# Patient Record
Sex: MALE | Race: WHITE | NOT HISPANIC OR LATINO | Employment: OTHER | URBAN - METROPOLITAN AREA
[De-identification: names, ages, dates, MRNs, and addresses within clinical notes are randomized per-mention and may not be internally consistent; named-entity substitution may affect disease eponyms.]

---

## 2017-02-08 ENCOUNTER — ALLSCRIPTS OFFICE VISIT (OUTPATIENT)
Dept: OTHER | Facility: OTHER | Age: 72
End: 2017-02-08

## 2017-03-22 ENCOUNTER — ALLSCRIPTS OFFICE VISIT (OUTPATIENT)
Dept: OTHER | Facility: OTHER | Age: 72
End: 2017-03-22

## 2017-04-19 DIAGNOSIS — N40.0 ENLARGED PROSTATE WITHOUT LOWER URINARY TRACT SYMPTOMS (LUTS): ICD-10-CM

## 2017-04-19 DIAGNOSIS — E78.00 PURE HYPERCHOLESTEROLEMIA: ICD-10-CM

## 2017-04-19 DIAGNOSIS — I10 ESSENTIAL (PRIMARY) HYPERTENSION: ICD-10-CM

## 2017-09-05 DIAGNOSIS — N40.0 ENLARGED PROSTATE WITHOUT LOWER URINARY TRACT SYMPTOMS (LUTS): ICD-10-CM

## 2017-10-02 ENCOUNTER — ALLSCRIPTS OFFICE VISIT (OUTPATIENT)
Dept: OTHER | Facility: OTHER | Age: 72
End: 2017-10-02

## 2018-01-12 VITALS
BODY MASS INDEX: 27.7 KG/M2 | OXYGEN SATURATION: 97 % | HEART RATE: 84 BPM | DIASTOLIC BLOOD PRESSURE: 68 MMHG | TEMPERATURE: 97 F | HEIGHT: 70 IN | SYSTOLIC BLOOD PRESSURE: 124 MMHG | WEIGHT: 193.5 LBS

## 2018-01-13 VITALS
TEMPERATURE: 97.9 F | HEART RATE: 86 BPM | DIASTOLIC BLOOD PRESSURE: 74 MMHG | SYSTOLIC BLOOD PRESSURE: 118 MMHG | BODY MASS INDEX: 27.51 KG/M2 | HEIGHT: 70 IN | WEIGHT: 192.13 LBS | OXYGEN SATURATION: 98 %

## 2018-01-13 VITALS
OXYGEN SATURATION: 97 % | DIASTOLIC BLOOD PRESSURE: 72 MMHG | SYSTOLIC BLOOD PRESSURE: 122 MMHG | HEART RATE: 75 BPM | HEIGHT: 70 IN | WEIGHT: 192 LBS | BODY MASS INDEX: 27.49 KG/M2 | TEMPERATURE: 98.3 F

## 2018-04-02 DIAGNOSIS — N40.0 ENLARGED PROSTATE WITHOUT LOWER URINARY TRACT SYMPTOMS (LUTS): ICD-10-CM

## 2018-04-02 DIAGNOSIS — H61.23 IMPACTED CERUMEN OF BOTH EARS: ICD-10-CM

## 2018-04-02 DIAGNOSIS — E78.00 PURE HYPERCHOLESTEROLEMIA: ICD-10-CM

## 2018-04-02 DIAGNOSIS — I10 ESSENTIAL (PRIMARY) HYPERTENSION: ICD-10-CM

## 2018-05-16 LAB
LEFT EYE DIABETIC RETINOPATHY: NORMAL
RIGHT EYE DIABETIC RETINOPATHY: NORMAL

## 2018-05-23 ENCOUNTER — OFFICE VISIT (OUTPATIENT)
Dept: INTERNAL MEDICINE CLINIC | Facility: CLINIC | Age: 73
End: 2018-05-23
Payer: MEDICARE

## 2018-05-23 VITALS
TEMPERATURE: 97.6 F | DIASTOLIC BLOOD PRESSURE: 80 MMHG | HEART RATE: 72 BPM | WEIGHT: 187.2 LBS | OXYGEN SATURATION: 98 % | BODY MASS INDEX: 26.8 KG/M2 | SYSTOLIC BLOOD PRESSURE: 122 MMHG | HEIGHT: 70 IN

## 2018-05-23 DIAGNOSIS — E78.00 HYPERCHOLESTEROLEMIA: Primary | ICD-10-CM

## 2018-05-23 DIAGNOSIS — H61.23 IMPACTED CERUMEN OF BOTH EARS: ICD-10-CM

## 2018-05-23 DIAGNOSIS — N40.0 ENLARGED PROSTATE WITHOUT LOWER URINARY TRACT SYMPTOMS (LUTS): ICD-10-CM

## 2018-05-23 DIAGNOSIS — I10 BENIGN ESSENTIAL HTN: ICD-10-CM

## 2018-05-23 PROCEDURE — 99214 OFFICE O/P EST MOD 30 MIN: CPT | Performed by: INTERNAL MEDICINE

## 2018-05-23 RX ORDER — AMPICILLIN TRIHYDRATE 250 MG
1 CAPSULE ORAL 2 TIMES DAILY
COMMUNITY
End: 2018-05-23 | Stop reason: ALTCHOICE

## 2018-05-23 RX ORDER — AMOXICILLIN 500 MG
2 CAPSULE ORAL DAILY
COMMUNITY

## 2018-05-23 RX ORDER — LANOLIN ALCOHOL/MO/W.PET/CERES
1 CREAM (GRAM) TOPICAL DAILY
COMMUNITY

## 2018-05-23 RX ORDER — ELECTROLYTES/DEXTROSE
1 SOLUTION, ORAL ORAL DAILY
COMMUNITY

## 2018-05-23 RX ORDER — ROSUVASTATIN CALCIUM 5 MG/1
5 TABLET, COATED ORAL DAILY
Qty: 30 TABLET | Refills: 5 | Status: SHIPPED | OUTPATIENT
Start: 2018-05-23 | End: 2018-06-14 | Stop reason: SINTOL

## 2018-05-23 NOTE — PROGRESS NOTES
Assessment/Plan:    Benign essential HTN   Blood pressure is well controlled at the present time  No changes in medication are necessary    Impacted cerumen of both ears   Cerumen manually disimpacted with cerumen spoon with good results  Hypercholesterolemia    Will discontinue red yeast rice and initiate treatment with rosuvastatin 5 mg daily monitoring for myalgias  Enlarged prostate without lower urinary tract symptoms (luts)    Follow-up PSA and free PSA in 6 months  Diagnoses and all orders for this visit:    Hypercholesterolemia  -     rosuvastatin (CRESTOR) 5 mg tablet; Take 1 tablet (5 mg total) by mouth daily for 180 days  -     CBC and Platelet; Future  -     Comprehensive metabolic panel; Future  -     Lipid panel; Future    Benign essential HTN  -     CBC and Platelet; Future  -     Comprehensive metabolic panel; Future  -     Lipid panel; Future    Impacted cerumen of both ears  -     CBC and Platelet; Future  -     Comprehensive metabolic panel; Future  -     Lipid panel; Future    Enlarged prostate without lower urinary tract symptoms (luts)  -     CBC and Platelet; Future  -     Comprehensive metabolic panel; Future  -     Lipid panel; Future  -     PSA, total and free; Future    Other orders  -     Omega-3 Fatty Acids (FISH OIL) 1200 MG CAPS; Take by mouth daily  -     Multiple Vitamins-Minerals (MULTIVITAMIN ADULT) TABS; Take 1 tablet by mouth daily  -     Discontinue: Red Yeast Rice 600 MG CAPS; Take 1 tablet by mouth 2 (two) times a day  -     Ascorbic Acid (VITAMIN C ER) 500 MG CPCR; Take 1 capsule by mouth daily          Subjective:      Patient ID: Teresita Hall is a 71 y o  male  Patient presents to the office for follow-up visit for hypertension, hypercholesterolemia, history of enlarged prostate without prostate symptomatology and recurrent cerumen impactions  Currently he is not experiencing any significant hearing loss  He had labs drawn    Thyroid function is normal  CMP is normal   CBC is normal   PSA is mildly elevated with a free PSA of 26%  Cholesterol is 214  LDL cholesterol is elevated at 151  We discussed the possibility of using a low-dose statin such as Crestor in place of red yeast rice  The thinking being that the low-dose statin may be better tolerated since he has been tolerating the red yeast rice for quite some time now and it may be more effective than the red yeast rice          Family History   Problem Relation Age of Onset    Alcohol abuse Father      independently diagnosed     Heart disease Father     Cancer Father     Hypertension Mother     COPD Mother     Diabetes type II Mother     Hypertension Maternal Grandmother     Coronary artery disease Maternal Grandmother     Diabetes Maternal Grandmother     Lung cancer Maternal Grandmother     Coronary artery disease Family     Stroke Paternal Grandmother     Lung cancer Maternal Grandfather      Social History     Social History    Marital status: /Civil Union     Spouse name: N/A    Number of children: N/A    Years of education: N/A     Occupational History          Social History Main Topics    Smoking status: Never Smoker    Smokeless tobacco: Never Used    Alcohol use Yes      Comment: social     Drug use: No    Sexual activity: Not on file     Other Topics Concern    Not on file     Social History Narrative    Advance direct of file     Caffeine use -  He admits to consuming caffeine via coffee; 2-3 cups per week; and tea 2 cups per day     Uses safety belts          Past Medical History:   Diagnosis Date    Aortic valve disorder     Basal cell carcinoma of nose     removed 07/2015    Hypercholesterolemia     Hyperlipidemia     Mitral valve disorder     Osteoarthritis     Seasonal allergies     resolved 01/21/2015       Current Outpatient Prescriptions:     Ascorbic Acid (VITAMIN C ER) 500 MG CPCR, Take 1 capsule by mouth daily, Disp: , Rfl:     Multiple Vitamins-Minerals (MULTIVITAMIN ADULT) TABS, Take 1 tablet by mouth daily, Disp: , Rfl:     Omega-3 Fatty Acids (FISH OIL) 1200 MG CAPS, Take by mouth daily, Disp: , Rfl:     rosuvastatin (CRESTOR) 5 mg tablet, Take 1 tablet (5 mg total) by mouth daily for 180 days, Disp: 30 tablet, Rfl: 5  Allergies   Allergen Reactions    Seasonal Ic [Cholestatin] Nasal Congestion    Ezetimibe GI Intolerance     Past Surgical History:   Procedure Laterality Date    DENTAL SURGERY      1970's    KNEE SURGERY      OTHER SURGICAL HISTORY      cerumen removal 1st 05/26/2011;   2nd:  08/02/2012         Review of Systems   Constitutional: Negative for activity change, appetite change, chills and fever  HENT: Positive for congestion, postnasal drip, rhinorrhea, sinus pain, sinus pressure and sore throat (Mild sore throat when he wakes up during allergy season, resolves within an hour)  Negative for ear discharge, ear pain and hearing loss  Eyes: Negative  Respiratory: Negative for chest tightness, shortness of breath and wheezing  Cardiovascular: Negative for chest pain  Gastrointestinal: Negative for constipation, diarrhea, nausea and vomiting  Genitourinary: Positive for difficulty urinating and frequency  Negative for dysuria  Musculoskeletal: Positive for arthralgias (Left knee is worse than right knee)  Allergic/Immunologic: Positive for environmental allergies  Neurological: Positive for light-headedness and headaches (Occasional headaches accompanied by sinus pain/pressure)  Negative for dizziness and syncope  Objective:      /80 (BP Location: Left arm, Patient Position: Sitting, Cuff Size: Standard)   Pulse 72   Temp 97 6 °F (36 4 °C) (Oral)   Ht 5' 10" (1 778 m)   Wt 84 9 kg (187 lb 3 2 oz)   SpO2 98%   BMI 26 86 kg/m²          Physical Exam   Constitutional: He is oriented to person, place, and time  He appears well-developed and well-nourished  No distress     HENT:   Head: Normocephalic and atraumatic  Bilateral nonobstructing cerumen   Eyes: Conjunctivae and EOM are normal  Pupils are equal, round, and reactive to light  Neck: Normal range of motion  Neck supple  No JVD present  No tracheal deviation present  Cardiovascular: Normal rate, regular rhythm and normal heart sounds  Pulmonary/Chest: Effort normal  No respiratory distress  Abdominal: Soft  He exhibits no distension  Musculoskeletal: Normal range of motion  He exhibits no edema or deformity  Lymphadenopathy:     He has no cervical adenopathy  Neurological: He is alert and oriented to person, place, and time  Skin: Skin is warm and dry  Psychiatric: He has a normal mood and affect  Thought content normal    Vitals reviewed

## 2018-05-23 NOTE — ASSESSMENT & PLAN NOTE
Will discontinue red yeast rice and initiate treatment with rosuvastatin 5 mg daily monitoring for myalgias

## 2018-05-23 NOTE — PATIENT INSTRUCTIONS
Recheck of labs in 6 months  Substitute Crestor for red yeast rice  Recheck PSA and free PSA  LEBRON on follow-up exam if PSA remains elevated

## 2018-06-12 ENCOUNTER — TELEPHONE (OUTPATIENT)
Dept: INTERNAL MEDICINE CLINIC | Facility: CLINIC | Age: 73
End: 2018-06-12

## 2018-06-12 NOTE — TELEPHONE ENCOUNTER
Pt called and stated that he is having strange reactions to his prescription for Rosuvastatin 5mg tablet  He explains that Dr Villagran Sees stated that if he has any strange reactions to stop taking the medication and so he did  He states that he stopped taking the medication about four days ago  Every time he took the medication it was around dinner time  He notes he would feel very weak, and hungry 3 hours later after eating  The pt would feel a "weird" sensation every time after he would eat  The pt explains his symptoms have gotten better since he has stopped taking the medication  He would like to speak with a provider about the symptoms he was having and also to inform Dr Villagran Sees that he will no longer be taking the medication, and would also like to know if he should make an appointment and be seen in regards to the situation  If someone could please look into this, and If you have any questions you can reach the pt at [phone]  Thank you!

## 2018-06-12 NOTE — TELEPHONE ENCOUNTER
Spoke with this patient and he is describing nonspecific hunger after eating while taking his statin  Patient advised to make an appointment to follow up with Dr Tiki Marques and come in sooner if acute symptoms develop

## 2018-06-14 ENCOUNTER — OFFICE VISIT (OUTPATIENT)
Dept: INTERNAL MEDICINE CLINIC | Facility: CLINIC | Age: 73
End: 2018-06-14
Payer: MEDICARE

## 2018-06-14 VITALS
WEIGHT: 190.4 LBS | BODY MASS INDEX: 26.65 KG/M2 | HEART RATE: 83 BPM | HEIGHT: 71 IN | TEMPERATURE: 98 F | OXYGEN SATURATION: 96 % | DIASTOLIC BLOOD PRESSURE: 82 MMHG | RESPIRATION RATE: 20 BRPM | SYSTOLIC BLOOD PRESSURE: 130 MMHG

## 2018-06-14 DIAGNOSIS — E78.00 HYPERCHOLESTEROLEMIA: Primary | ICD-10-CM

## 2018-06-14 DIAGNOSIS — I10 BENIGN ESSENTIAL HTN: ICD-10-CM

## 2018-06-14 PROBLEM — T50.905A DRUG REACTION: Status: ACTIVE | Noted: 2018-06-14

## 2018-06-14 PROCEDURE — 99213 OFFICE O/P EST LOW 20 MIN: CPT | Performed by: INTERNAL MEDICINE

## 2018-06-14 NOTE — ASSESSMENT & PLAN NOTE
Rosuvastatin will be discontinued we will return to red yeast rice 600 mg daily increasing to 1800 mg daily as tolerated

## 2018-06-14 NOTE — PROGRESS NOTES
Assessment/Plan:    Benign essential HTN   Blood pressure is stable no additional therapy is indicated  Hypercholesterolemia    Rosuvastatin will be discontinued we will return to red yeast rice 600 mg daily increasing to 1800 mg daily as tolerated       Diagnoses and all orders for this visit:    Hypercholesterolemia    Benign essential HTN          Subjective:      Patient ID: Terri Izquierdo is a 71 y o  male  Patient presents to the office after experiencing a  Reaction to rosuvastatin  He had previously been intolerant to statin medications and after a long well on red yeast rice we attempted to reintroduce statin medications at a low dose  He was prescribed 5 mg of rosuvastatin and after taking the 1st dose experienced some numbness muscle fatigue and a hunger sensation which persisted into the next day  He waited a few more days for symptoms to resolve and then reintroduce the medicine and began to feel the same symptoms once again so he discontinued the medication and after for 5 days he now feels back to his baseline          Family History   Problem Relation Age of Onset    Alcohol abuse Father         independently diagnosed     Heart disease Father     Cancer Father     Hypertension Mother     COPD Mother     Diabetes type II Mother     Hypertension Maternal Grandmother     Coronary artery disease Maternal Grandmother     Diabetes Maternal Grandmother     Lung cancer Maternal Grandmother     Coronary artery disease Family     Stroke Paternal Grandmother     Lung cancer Maternal Grandfather      Social History     Social History    Marital status: /Civil Union     Spouse name: N/A    Number of children: N/A    Years of education: N/A     Occupational History          Social History Main Topics    Smoking status: Never Smoker    Smokeless tobacco: Never Used    Alcohol use Yes      Comment: socially    Drug use: No    Sexual activity: Not on file     Other Topics Concern    Not on file     Social History Narrative    Advance direct of file     Caffeine use -  He admits to consuming caffeine via coffee; 2-3 cups per week; and tea 2 cups per day     Uses safety belts          Past Medical History:   Diagnosis Date    Aortic valve disorder     Basal cell carcinoma of nose     removed 07/2015    Hypercholesterolemia     Hyperlipidemia     Mitral valve disorder     Osteoarthritis     Seasonal allergies     resolved 01/21/2015       Current Outpatient Prescriptions:     Ascorbic Acid (VITAMIN C ER) 500 MG CPCR, Take 1 capsule by mouth daily, Disp: , Rfl:     Multiple Vitamins-Minerals (MULTIVITAMIN ADULT) TABS, Take 1 tablet by mouth daily, Disp: , Rfl:     Omega-3 Fatty Acids (FISH OIL) 1200 MG CAPS, Take by mouth daily, Disp: , Rfl:   Allergies   Allergen Reactions    Seasonal Ic [Cholestatin] Nasal Congestion    Ezetimibe GI Intolerance     Past Surgical History:   Procedure Laterality Date    DENTAL SURGERY      1970's    KNEE SURGERY      OTHER SURGICAL HISTORY      cerumen removal 1st 05/26/2011;   2nd:  08/02/2012         Review of Systems   Constitutional: Positive for fatigue (When taking rosuvastatin)  HENT: Negative  Eyes: Negative  Respiratory: Negative  Cardiovascular: Negative  Musculoskeletal: Negative  Neurological: Positive for numbness (when taking rosuvastatin)  Objective:      /82 (BP Location: Right arm, Patient Position: Sitting, Cuff Size: Adult)   Pulse 83   Temp 98 °F (36 7 °C) (Oral)   Resp 20   Ht 5' 11" (1 803 m) Comment: with shoes on  Wt 86 4 kg (190 lb 6 4 oz) Comment: with shoes on  SpO2 96% Comment: room air  BMI 26 56 kg/m²          Physical Exam   Constitutional: He is oriented to person, place, and time  He appears well-developed and well-nourished  HENT:   Head: Normocephalic and atraumatic  Eyes: Pupils are equal, round, and reactive to light  Neck: No JVD present   No tracheal deviation present  Cardiovascular: Normal rate, regular rhythm and normal heart sounds  Pulmonary/Chest: Effort normal and breath sounds normal    Abdominal: Soft  He exhibits no distension  Musculoskeletal: Normal range of motion  He exhibits no edema  Lymphadenopathy:     He has no cervical adenopathy  Neurological: He is alert and oriented to person, place, and time  Skin: Skin is warm and dry  Psychiatric: He has a normal mood and affect  Thought content normal    Vitals reviewed

## 2018-12-11 ENCOUNTER — OFFICE VISIT (OUTPATIENT)
Dept: INTERNAL MEDICINE CLINIC | Facility: CLINIC | Age: 73
End: 2018-12-11
Payer: MEDICARE

## 2018-12-11 VITALS
OXYGEN SATURATION: 97 % | SYSTOLIC BLOOD PRESSURE: 118 MMHG | HEART RATE: 87 BPM | HEIGHT: 71 IN | WEIGHT: 188.2 LBS | TEMPERATURE: 98.3 F | DIASTOLIC BLOOD PRESSURE: 74 MMHG | BODY MASS INDEX: 26.35 KG/M2

## 2018-12-11 DIAGNOSIS — N40.0 BPH WITH ELEVATED PSA: ICD-10-CM

## 2018-12-11 DIAGNOSIS — M25.562 LEFT KNEE PAIN, UNSPECIFIED CHRONICITY: ICD-10-CM

## 2018-12-11 DIAGNOSIS — R97.20 BPH WITH ELEVATED PSA: ICD-10-CM

## 2018-12-11 DIAGNOSIS — N40.0 ENLARGED PROSTATE WITHOUT LOWER URINARY TRACT SYMPTOMS (LUTS): Primary | ICD-10-CM

## 2018-12-11 DIAGNOSIS — E78.00 HYPERCHOLESTEROLEMIA: ICD-10-CM

## 2018-12-11 DIAGNOSIS — I10 BENIGN ESSENTIAL HTN: ICD-10-CM

## 2018-12-11 PROCEDURE — 99214 OFFICE O/P EST MOD 30 MIN: CPT | Performed by: INTERNAL MEDICINE

## 2018-12-11 RX ORDER — BETAMETHASONE DIPROPIONATE 0.5 MG/G
CREAM TOPICAL
Refills: 5 | COMMUNITY
Start: 2018-10-31 | End: 2020-11-10

## 2018-12-11 NOTE — PROGRESS NOTES
Assessment/Plan:    Benign essential HTN  Blood pressure is well controlled on current medications no changes necessary    Enlarged prostate without lower urinary tract symptoms (luts)  Enlarged prostate  PSA relatively stable mildly increased  Free PSA at 24 percent  LEBRON benign    Left knee pain  Will schedule x-rays of left knee to evaluate for osteoarthritis versus possible meniscus issue    BPH with elevated PSA  LEBRON benign  Follow-up PSA free PSA in 6 months    Hypercholesterolemia  No specific Rx necessary at this time  Diagnoses and all orders for this visit:    Enlarged prostate without lower urinary tract symptoms (luts)  -     PSA, total and free; Future    Benign essential HTN  -     CBC and Platelet; Future  -     Comprehensive metabolic panel; Future    Hypercholesterolemia  -     Lipid panel; Future    Left knee pain, unspecified chronicity  -     XR knee 3 vw left non injury; Future  -     CBC and Platelet; Future  -     Comprehensive metabolic panel; Future  -     C-reactive protein; Future  -     Sedimentation rate, automated; Future    BPH with elevated PSA  -     CBC and Platelet; Future  -     Comprehensive metabolic panel; Future  -     PSA, total and free; Future    Other orders  -     betamethasone, augmented, (DIPROLENE-AF) 0 05 % cream; APPLY TO AFFECTED ONCE DAILY AS DIRECTED          Subjective:      Patient ID: Inocencia Mas is a 71 y o  male  Patient presents for follow-up visit for hypertension,  BPH without symptoms, hypercholesterolemia, and is complaining of recent onset over the past several months of some left knee pain without swelling  Pain is usually worse after long periods of standing or walking  He denies any locking or loss of strength in the knee  He has a history of BPH with elevated PSAs but other than nocturia is not experiencing any significant symptomatology  His labs are reviewed  Cholesterol is mildly elevated    He has been intolerant to statin medications and ezetimibe in the past   The remainder of his labs were essentially stable          Family History   Problem Relation Age of Onset    Alcohol abuse Father         independently diagnosed     Heart disease Father     Cancer Father     Hypertension Mother     COPD Mother     Diabetes type II Mother     Hypertension Maternal Grandmother     Coronary artery disease Maternal Grandmother     Diabetes Maternal Grandmother     Lung cancer Maternal Grandmother     Coronary artery disease Family     Stroke Paternal Grandmother     Lung cancer Maternal Grandfather      Social History     Social History    Marital status: /Civil Union     Spouse name: N/A    Number of children: N/A    Years of education: N/A     Occupational History          Social History Main Topics    Smoking status: Never Smoker    Smokeless tobacco: Never Used    Alcohol use Yes      Comment: socially    Drug use: No    Sexual activity: Not on file     Other Topics Concern    Not on file     Social History Narrative    Advance direct of file     Caffeine use -  He admits to consuming caffeine via coffee; 2-3 cups per week; and tea 2 cups per day     Uses safety belts          Past Medical History:   Diagnosis Date    Aortic valve disorder     Basal cell carcinoma of nose     removed 07/2015    Hypercholesterolemia     Hyperlipidemia     Mitral valve disorder     Osteoarthritis     Seasonal allergies     resolved 01/21/2015       Current Outpatient Prescriptions:     Ascorbic Acid (VITAMIN C ER) 500 MG CPCR, Take 1 capsule by mouth daily, Disp: , Rfl:     betamethasone, augmented, (DIPROLENE-AF) 0 05 % cream, APPLY TO AFFECTED ONCE DAILY AS DIRECTED, Disp: , Rfl: 5    Multiple Vitamins-Minerals (MULTIVITAMIN ADULT) TABS, Take 1 tablet by mouth daily, Disp: , Rfl:     Omega-3 Fatty Acids (FISH OIL) 1200 MG CAPS, Take by mouth daily, Disp: , Rfl:   Allergies   Allergen Reactions    Seasonal Ic [Cholestatin] Nasal Congestion    Ezetimibe GI Intolerance     Past Surgical History:   Procedure Laterality Date    DENTAL SURGERY      1970's    KNEE SURGERY      OTHER SURGICAL HISTORY      cerumen removal 1st 05/26/2011;   2nd:  08/02/2012         Review of Systems   Constitutional: Negative  HENT: Negative  Respiratory: Negative  Cardiovascular: Negative  Gastrointestinal: Negative  Endocrine: Negative  Genitourinary:        Nocturia   Musculoskeletal: Positive for arthralgias (Left knee pain)  Neurological: Negative  Psychiatric/Behavioral: Negative  Objective:      /74 (BP Location: Left arm, Patient Position: Sitting, Cuff Size: Standard)   Pulse 87   Temp 98 3 °F (36 8 °C) (Oral)   Ht 5' 10 5" (1 791 m)   Wt 85 4 kg (188 lb 3 2 oz)   SpO2 97%   BMI 26 62 kg/m²          Physical Exam   Constitutional: He is oriented to person, place, and time  He appears well-developed and well-nourished  No distress  HENT:   Head: Normocephalic and atraumatic  Eyes: Conjunctivae are normal    Neck: No JVD present  No tracheal deviation present  Cardiovascular: Normal rate, normal heart sounds and intact distal pulses  Pulmonary/Chest: Breath sounds normal  He has no wheezes  He has no rales  Abdominal: Soft  There is no tenderness  Genitourinary: Rectum normal    Genitourinary Comments: Enlarged prostate without nodules  Musculoskeletal: Normal range of motion  He exhibits no edema or deformity  Lymphadenopathy:     He has no cervical adenopathy  Neurological: He is alert and oriented to person, place, and time  No focal motor deficits are appreciated  Skin: Skin is warm and dry  He is not diaphoretic  Psychiatric: He has a normal mood and affect   Thought content normal

## 2018-12-13 ENCOUNTER — HOSPITAL ENCOUNTER (OUTPATIENT)
Dept: RADIOLOGY | Facility: HOSPITAL | Age: 73
Discharge: HOME | End: 2018-12-13
Attending: INTERNAL MEDICINE
Payer: MEDICARE

## 2018-12-13 DIAGNOSIS — M25.562 LEFT KNEE PAIN, UNSPECIFIED CHRONICITY: ICD-10-CM

## 2018-12-13 PROCEDURE — 73562 X-RAY EXAM OF KNEE 3: CPT

## 2019-06-19 ENCOUNTER — OFFICE VISIT (OUTPATIENT)
Dept: INTERNAL MEDICINE CLINIC | Facility: CLINIC | Age: 74
End: 2019-06-19
Payer: MEDICARE

## 2019-06-19 VITALS
SYSTOLIC BLOOD PRESSURE: 124 MMHG | DIASTOLIC BLOOD PRESSURE: 74 MMHG | HEIGHT: 70 IN | TEMPERATURE: 97.9 F | BODY MASS INDEX: 26.51 KG/M2 | WEIGHT: 185.2 LBS | OXYGEN SATURATION: 98 % | HEART RATE: 82 BPM

## 2019-06-19 DIAGNOSIS — Z00.00 MEDICARE ANNUAL WELLNESS VISIT, SUBSEQUENT: ICD-10-CM

## 2019-06-19 DIAGNOSIS — Z11.59 NEED FOR HEPATITIS C SCREENING TEST: Primary | ICD-10-CM

## 2019-06-19 DIAGNOSIS — N40.0 BPH WITH ELEVATED PSA: ICD-10-CM

## 2019-06-19 DIAGNOSIS — Z23 NEED FOR DIPHTHERIA-TETANUS-PERTUSSIS (TDAP) VACCINE: ICD-10-CM

## 2019-06-19 DIAGNOSIS — Z12.11 COLON CANCER SCREENING: ICD-10-CM

## 2019-06-19 DIAGNOSIS — E78.00 HYPERCHOLESTEROLEMIA: ICD-10-CM

## 2019-06-19 DIAGNOSIS — Z12.11 SCREENING FOR COLON CANCER: ICD-10-CM

## 2019-06-19 DIAGNOSIS — H61.23 BILATERAL IMPACTED CERUMEN: ICD-10-CM

## 2019-06-19 DIAGNOSIS — M17.12 PRIMARY OSTEOARTHRITIS OF LEFT KNEE: ICD-10-CM

## 2019-06-19 DIAGNOSIS — I10 BENIGN ESSENTIAL HTN: ICD-10-CM

## 2019-06-19 DIAGNOSIS — R97.20 BPH WITH ELEVATED PSA: ICD-10-CM

## 2019-06-19 PROCEDURE — 69210 REMOVE IMPACTED EAR WAX UNI: CPT | Performed by: INTERNAL MEDICINE

## 2019-06-19 PROCEDURE — G0439 PPPS, SUBSEQ VISIT: HCPCS | Performed by: INTERNAL MEDICINE

## 2019-06-19 PROCEDURE — 99214 OFFICE O/P EST MOD 30 MIN: CPT | Performed by: INTERNAL MEDICINE

## 2019-06-19 RX ORDER — EZETIMIBE 10 MG/1
10 TABLET ORAL DAILY
Qty: 30 TABLET | Refills: 5 | Status: SHIPPED | OUTPATIENT
Start: 2019-06-19 | End: 2019-12-04 | Stop reason: SDUPTHER

## 2019-06-19 RX ORDER — UBIDECARENONE 50 MG
1 CAPSULE ORAL 2 TIMES DAILY
COMMUNITY
End: 2020-11-10 | Stop reason: ALTCHOICE

## 2019-06-20 ENCOUNTER — TELEPHONE (OUTPATIENT)
Dept: INTERNAL MEDICINE CLINIC | Facility: CLINIC | Age: 74
End: 2019-06-20

## 2019-06-20 DIAGNOSIS — Z12.11 COLON CANCER SCREENING: Primary | ICD-10-CM

## 2019-07-16 ENCOUNTER — CONSULT (OUTPATIENT)
Dept: UROLOGY | Facility: AMBULATORY SURGERY CENTER | Age: 74
End: 2019-07-16
Payer: MEDICARE

## 2019-07-16 VITALS
SYSTOLIC BLOOD PRESSURE: 144 MMHG | BODY MASS INDEX: 27.14 KG/M2 | HEIGHT: 70 IN | WEIGHT: 189.6 LBS | DIASTOLIC BLOOD PRESSURE: 72 MMHG | HEART RATE: 84 BPM

## 2019-07-16 DIAGNOSIS — R97.20 BPH WITH ELEVATED PSA: ICD-10-CM

## 2019-07-16 DIAGNOSIS — R97.20 ELEVATED PSA: Primary | ICD-10-CM

## 2019-07-16 DIAGNOSIS — N40.0 BPH WITH ELEVATED PSA: ICD-10-CM

## 2019-07-16 PROCEDURE — 99204 OFFICE O/P NEW MOD 45 MIN: CPT | Performed by: UROLOGY

## 2019-07-16 RX ORDER — CIPROFLOXACIN 500 MG/1
500 TABLET, FILM COATED ORAL EVERY 12 HOURS SCHEDULED
Qty: 2 TABLET | Refills: 0 | Status: SHIPPED | OUTPATIENT
Start: 2019-07-16 | End: 2019-07-17

## 2019-07-16 NOTE — PROGRESS NOTES
7/16/2019    Siva Pittman  1945  [id number]        Assessment  Elevated PSA      Discussion  Based on his overall trend of PSA and current PSA of 5 97, I recommend transrectal ultrasound-guided biopsy of the prostate  Risk and benefits of the procedure were discussed and reviewed  Pre biopsy preparations including antibiotics were discussed  History of Present Illness  68 y o  male with a history of a PSA that has been trending upward over time  His most recent PSA is 5 97 from June 2019  He denies family history of prostate cancer  He has very mild lower urinary tract symptoms including nocturia times 1-2 episodes per night  He denies ever seeing gross hematuria  He is retired   He states that he is in relatively good health at 68years of age  AUA Symptom Score  AUA SYMPTOM SCORE      Most Recent Value   AUA SYMPTOM SCORE   How often have you had a sensation of not emptying your bladder completely after you finished urinating? 0   How often have you had to urinate again less than two hours after you finished urinating? 1   How often have you found you stopped and started again several times when you urinate?  0   How often have you found it difficult to postpone urination? 2   How often have you had a weak urinary stream?  2   How often have you had to push or strain to begin urination? 0   How many times did you most typically get up to urinate from the time you went to bed at night until the time you got up in the morning? 2   Quality of Life: If you were to spend the rest of your life with your urinary condition just the way it is now, how would you feel about that?  2   AUA SYMPTOM SCORE  7          Review of Systems  Review of Systems   Constitutional: Negative  HENT: Negative  Eyes: Negative  Respiratory: Negative  Cardiovascular: Negative  Gastrointestinal: Negative  Endocrine: Negative  Genitourinary: Negative  Musculoskeletal: Negative  Skin: Negative  Allergic/Immunologic: Negative  Neurological: Negative  Hematological: Negative  Psychiatric/Behavioral: Negative            Past Medical History  Past Medical History:   Diagnosis Date    Aortic valve disorder     Basal cell carcinoma of nose     removed 07/2015    Hypercholesterolemia     Hyperlipidemia     Mitral valve disorder     Osteoarthritis     Seasonal allergies     resolved 01/21/2015       Past Social History  Past Surgical History:   Procedure Laterality Date    DENTAL SURGERY      1970's    KNEE SURGERY      OTHER SURGICAL HISTORY      cerumen removal 1st 05/26/2011;   2nd:  08/02/2012       Past Family History  Family History   Problem Relation Age of Onset    Alcohol abuse Father         independently diagnosed     Heart disease Father     Cancer Father     Hypertension Mother     COPD Mother     Diabetes type II Mother     Hypertension Maternal Grandmother     Coronary artery disease Maternal Grandmother     Diabetes Maternal Grandmother     Lung cancer Maternal Grandmother     Coronary artery disease Family     Stroke Paternal Grandmother     Lung cancer Maternal Grandfather        Past Social history  Social History     Socioeconomic History    Marital status: /Civil Union     Spouse name: Not on file    Number of children: Not on file    Years of education: Not on file    Highest education level: Not on file   Occupational History    Occupation:    Social Needs    Financial resource strain: Not on file    Food insecurity:     Worry: Not on file     Inability: Not on file    Transportation needs:     Medical: Not on file     Non-medical: Not on file   Tobacco Use    Smoking status: Never Smoker    Smokeless tobacco: Never Used   Substance and Sexual Activity    Alcohol use: Yes     Comment: socially    Drug use: No    Sexual activity: Not on file   Lifestyle    Physical activity:     Days per week: Not on file Minutes per session: Not on file    Stress: Not on file   Relationships    Social connections:     Talks on phone: Not on file     Gets together: Not on file     Attends Caodaism service: Not on file     Active member of club or organization: Not on file     Attends meetings of clubs or organizations: Not on file     Relationship status: Not on file    Intimate partner violence:     Fear of current or ex partner: Not on file     Emotionally abused: Not on file     Physically abused: Not on file     Forced sexual activity: Not on file   Other Topics Concern    Not on file   Social History Narrative    Advance direct of file     Caffeine use -  He admits to consuming caffeine via coffee; 2-3 cups per week; and tea 2 cups per day     Uses safety belts        Current Medications  Current Outpatient Medications   Medication Sig Dispense Refill    Ascorbic Acid (VITAMIN C ER) 500 MG CPCR Take 1 capsule by mouth daily      ezetimibe (ZETIA) 10 mg tablet Take 1 tablet (10 mg total) by mouth daily for 180 days 30 tablet 5    Multiple Vitamins-Minerals (MULTIVITAMIN ADULT) TABS Take 1 tablet by mouth daily      Omega-3 Fatty Acids (FISH OIL) 1200 MG CAPS Take by mouth daily      Red Yeast Rice 600 MG TABS Take 1 tablet by mouth 2 (two) times a day      betamethasone, augmented, (DIPROLENE-AF) 0 05 % cream APPLY TO AFFECTED ONCE DAILY AS DIRECTED  5    ciprofloxacin (CIPRO) 500 mg tablet Take 1 tablet (500 mg total) by mouth every 12 (twelve) hours for 2 doses 1st dose at least one hour prior to biopsy 2 tablet 0     No current facility-administered medications for this visit  Allergies  Allergies   Allergen Reactions    Seasonal Ic [Cholestatin] Nasal Congestion    Ezetimibe GI Intolerance       Past Medical History, Social History, Family History, medications and allergies were reviewed      Vitals  Vitals:    07/16/19 1449   BP: 144/72   Pulse: 84   Weight: 86 kg (189 lb 9 6 oz)   Height: 5' 10" (1 778 m)       Physical Exam  Physical Exam    On examination he is in no acute distress  His abdomen is soft nontender nondistended   examination reveals no CVA tenderness  Digital rectal examination is deferred until the time of prostate biopsy  Gait normal   Affect normal   Skin is warm  Extremities without edema    Neurologic is grossly intact and nonfocal    Results  No results found for: PSA  No results found for: GLUCOSE, CALCIUM, NA, K, CO2, CL, BUN, CREATININE  No results found for: WBC, HGB, HCT, MCV, PLT      Office Urine Dip  No results found for this or any previous visit (from the past 1 hour(s)) ]

## 2019-07-18 ENCOUNTER — TELEPHONE (OUTPATIENT)
Dept: UROLOGY | Facility: AMBULATORY SURGERY CENTER | Age: 74
End: 2019-07-18

## 2019-07-18 NOTE — TELEPHONE ENCOUNTER
I returned patient's call and informed him that if he is getting the colonoscopy 1st, it would not interfere with the biopsy  If he is getting the biopsy first, then he should wait several weeks to get the colonoscopy     Please verify this is correct

## 2019-07-18 NOTE — TELEPHONE ENCOUNTER
Patient managed by Myles Castanon has questions regarding scheduling his colonoscopy around the same time of his biopsy  Would not want test to interfere with on another

## 2019-07-19 NOTE — TELEPHONE ENCOUNTER
Correct -- if having colonoscopy first then would not interfere with biopsy  If biopsy first, should wait 4-6 weeks for colonoscopy

## 2019-07-22 ENCOUNTER — TELEPHONE (OUTPATIENT)
Dept: GASTROENTEROLOGY | Facility: AMBULARY SURGERY CENTER | Age: 74
End: 2019-07-22

## 2019-07-25 ENCOUNTER — TELEPHONE (OUTPATIENT)
Dept: GASTROENTEROLOGY | Facility: AMBULARY SURGERY CENTER | Age: 74
End: 2019-07-25

## 2019-07-31 ENCOUNTER — ANESTHESIA EVENT (OUTPATIENT)
Dept: GASTROENTEROLOGY | Facility: AMBULARY SURGERY CENTER | Age: 74
End: 2019-07-31

## 2019-08-02 ENCOUNTER — ANESTHESIA (OUTPATIENT)
Dept: GASTROENTEROLOGY | Facility: AMBULARY SURGERY CENTER | Age: 74
End: 2019-08-02

## 2019-08-02 ENCOUNTER — HOSPITAL ENCOUNTER (OUTPATIENT)
Dept: GASTROENTEROLOGY | Facility: AMBULARY SURGERY CENTER | Age: 74
Setting detail: OUTPATIENT SURGERY
Discharge: HOME | End: 2019-08-02
Attending: INTERNAL MEDICINE | Admitting: INTERNAL MEDICINE
Payer: MEDICARE

## 2019-08-02 VITALS
WEIGHT: 181 LBS | HEIGHT: 70 IN | OXYGEN SATURATION: 98 % | RESPIRATION RATE: 16 BRPM | TEMPERATURE: 98 F | HEART RATE: 72 BPM | DIASTOLIC BLOOD PRESSURE: 68 MMHG | BODY MASS INDEX: 25.91 KG/M2 | SYSTOLIC BLOOD PRESSURE: 122 MMHG

## 2019-08-02 DIAGNOSIS — Z12.11 SCREENING FOR COLON CANCER: ICD-10-CM

## 2019-08-02 PROCEDURE — 1124F ACP DISCUSS-NO DSCNMKR DOCD: CPT | Performed by: INTERNAL MEDICINE

## 2019-08-02 PROCEDURE — G0121 COLON CA SCRN NOT HI RSK IND: HCPCS | Performed by: INTERNAL MEDICINE

## 2019-08-02 RX ORDER — PROPOFOL 10 MG/ML
INJECTION, EMULSION INTRAVENOUS AS NEEDED
Status: DISCONTINUED | OUTPATIENT
Start: 2019-08-02 | End: 2019-08-02 | Stop reason: SURG

## 2019-08-02 RX ORDER — SODIUM CHLORIDE, SODIUM LACTATE, POTASSIUM CHLORIDE, CALCIUM CHLORIDE 600; 310; 30; 20 MG/100ML; MG/100ML; MG/100ML; MG/100ML
100 INJECTION, SOLUTION INTRAVENOUS CONTINUOUS
Status: DISCONTINUED | OUTPATIENT
Start: 2019-08-02 | End: 2019-08-06 | Stop reason: HOSPADM

## 2019-08-02 RX ORDER — LIDOCAINE HYDROCHLORIDE 10 MG/ML
INJECTION, SOLUTION INFILTRATION; PERINEURAL AS NEEDED
Status: DISCONTINUED | OUTPATIENT
Start: 2019-08-02 | End: 2019-08-02 | Stop reason: SURG

## 2019-08-02 RX ADMIN — SODIUM CHLORIDE, SODIUM LACTATE, POTASSIUM CHLORIDE, AND CALCIUM CHLORIDE 100 ML/HR: .6; .31; .03; .02 INJECTION, SOLUTION INTRAVENOUS at 11:06

## 2019-08-02 RX ADMIN — LIDOCAINE HYDROCHLORIDE ANHYDROUS 50 MG: 10 INJECTION, SOLUTION INFILTRATION at 11:56

## 2019-08-02 RX ADMIN — PROPOFOL 50 MG: 10 INJECTION, EMULSION INTRAVENOUS at 11:58

## 2019-08-02 RX ADMIN — PROPOFOL 100 MG: 10 INJECTION, EMULSION INTRAVENOUS at 11:56

## 2019-08-02 RX ADMIN — SODIUM CHLORIDE, SODIUM LACTATE, POTASSIUM CHLORIDE, AND CALCIUM CHLORIDE: .6; .31; .03; .02 INJECTION, SOLUTION INTRAVENOUS at 11:50

## 2019-08-02 NOTE — H&P
History and Physical - [facility]  Rafael Tameka 68 y o  male MRN: [id number]        HPI:  78-year-old male with history of hyperlipidemia was referred for colonoscopy  Patient has regular bowel movements and denies any blood or mucus in the stool  Appetite is good and denies any recent weight loss  Denies any abdominal pain, nausea, or vomiting  Has no heartburn or acid reflux  Denies any difficulty swallowing          His last colonoscopy was 10 years ago    Historical Information   Past Medical History:   Diagnosis Date    Aortic valve disorder     Basal cell carcinoma of nose     removed 07/2015    Hypercholesterolemia     Hyperlipidemia     Mitral valve disorder     Osteoarthritis     Seasonal allergies     resolved 01/21/2015     Past Surgical History:   Procedure Laterality Date    COLONOSCOPY      DENTAL SURGERY      1970's    KNEE SURGERY      OTHER SURGICAL HISTORY      cerumen removal 1st 05/26/2011;   2nd:  08/02/2012     Social History   Social History     Substance and Sexual Activity   Alcohol Use Yes    Alcohol/week: 1 0 standard drinks    Types: 1 Glasses of wine per week    Frequency: 2-4 times a month    Drinks per session: 1 or 2    Binge frequency: Never    Comment: socially     Social History     Substance and Sexual Activity   Drug Use No     Social History     Tobacco Use   Smoking Status Never Smoker   Smokeless Tobacco Never Used     Family History   Problem Relation Age of Onset    Alcohol abuse Father         independently diagnosed     Heart disease Father     Cancer Father     Hypertension Mother     COPD Mother     Diabetes type II Mother     Hypertension Maternal Grandmother     Coronary artery disease Maternal Grandmother     Diabetes Maternal Grandmother     Lung cancer Maternal Grandmother     Coronary artery disease Family     Stroke Paternal Grandmother     Lung cancer Maternal Grandfather        Meds/Allergies       (Not in a hospital admission)    Allergies   Allergen Reactions    Seasonal Ic [Cholestatin] Nasal Congestion       Objective     Blood pressure 146/92, pulse 82, temperature 97 6 °F (36 4 °C), temperature source Temporal, resp  rate 18, height 5' 10" (1 778 m), weight 82 1 kg (181 lb), SpO2 94 %      PHYSICAL EXAM:    Gen: NAD  CV: S1 & S2 normal, RRR  CHEST: Clear to auscultate  ABD: soft, NT/ND, good bowel sounds  EXT: no edema    ASSESSMENT:     Screening for colon cancer    PLAN:    Colonoscopy

## 2019-08-02 NOTE — ANESTHESIA PREPROCEDURE EVALUATION
Review of Systems/Medical History  Patient summary reviewed  Chart reviewed  No history of anesthetic complications     Cardiovascular  Exercise tolerance (METS): >4,  Hyperlipidemia, Hypertension ,    Pulmonary  Negative pulmonary ROS        GI/Hepatic    Bowel prep       Negative  ROS        Endo/Other  Negative endo/other ROS      GYN       Hematology  Negative hematology ROS      Musculoskeletal    Arthritis     Neurology  Negative neurology ROS      Psychology   Negative psychology ROS            Physical Exam    Airway    Mallampati score: I  TM Distance: >3 FB  Neck ROM: full     Dental   Comment: Crowns, none removeable, No notable dental hx     Cardiovascular      Pulmonary      Other Findings      Anesthesia Plan  ASA Score- 2     Anesthesia Type- IV sedation with anesthesia with ASA Monitors  Additional Monitors:   Airway Plan:         Plan Factors-    Induction- intravenous  Postoperative Plan-     Informed Consent- Anesthetic plan and risks discussed with patient  I personally reviewed this patient with the CRNA  Discussed and agreed on the Anesthesia Plan with the CRNA  Luna Fairbanks

## 2019-08-06 ENCOUNTER — TELEPHONE (OUTPATIENT)
Dept: UROLOGY | Facility: MEDICAL CENTER | Age: 74
End: 2019-08-06

## 2019-08-06 NOTE — TELEPHONE ENCOUNTER
His PSA remains elevated at 6 32 and needs to continue with his follow-up appointment scheduled 08/27/2019 with Dr Ja Ann

## 2019-08-06 NOTE — TELEPHONE ENCOUNTER
Patient is managed by Ricardo Soriano at the [place] office  Patient is seen for elevated PSA  Patient is scheduled for a prostate biopsy on 8/27/19 with Dr Recinos  Patient is calling for PSA results  Results are scanned in under labs  Please review

## 2019-08-06 NOTE — TELEPHONE ENCOUNTER
Patient of Dr Tammi Smith seen in [facility] in regards to results of latest PSA  Results not yet available in Epic  Had lab work done through "[employer]      He can be reached at [phone]

## 2019-08-27 ENCOUNTER — PROCEDURE VISIT (OUTPATIENT)
Dept: UROLOGY | Facility: AMBULATORY SURGERY CENTER | Age: 74
End: 2019-08-27
Payer: MEDICARE

## 2019-08-27 ENCOUNTER — TELEPHONE (OUTPATIENT)
Dept: UROLOGY | Facility: MEDICAL CENTER | Age: 74
End: 2019-08-27

## 2019-08-27 VITALS
WEIGHT: 188 LBS | HEIGHT: 70 IN | SYSTOLIC BLOOD PRESSURE: 138 MMHG | DIASTOLIC BLOOD PRESSURE: 70 MMHG | BODY MASS INDEX: 26.92 KG/M2

## 2019-08-27 DIAGNOSIS — R97.20 ELEVATED PSA: Primary | ICD-10-CM

## 2019-08-27 PROCEDURE — 88342 IMHCHEM/IMCYTCHM 1ST ANTB: CPT | Performed by: PATHOLOGY

## 2019-08-27 PROCEDURE — G0416 PROSTATE BIOPSY, ANY MTHD: HCPCS | Performed by: PATHOLOGY

## 2019-08-27 PROCEDURE — 76942 ECHO GUIDE FOR BIOPSY: CPT | Performed by: UROLOGY

## 2019-08-27 PROCEDURE — 55700 PR BIOPSY OF PROSTATE,NEEDLE/PUNCH: CPT | Performed by: UROLOGY

## 2019-08-27 PROCEDURE — 76872 US TRANSRECTAL: CPT | Performed by: UROLOGY

## 2019-08-27 PROCEDURE — 88344 IMHCHEM/IMCYTCHM EA MLT ANTB: CPT | Performed by: PATHOLOGY

## 2019-08-27 RX ORDER — CIPROFLOXACIN 500 MG/1
TABLET, FILM COATED ORAL
Refills: 0 | COMMUNITY
Start: 2019-07-30 | End: 2019-10-03 | Stop reason: ALTCHOICE

## 2019-08-27 NOTE — PROGRESS NOTES
Biopsy prostate     Date/Time 8/27/2019 1:02 PM     Performed by  Isaak Ely MD     Authorized by Isaak Ely MD            Amada Carroll is a 81-year-old male with a PSA of approximately 5  A repeat PSA was 6 3  I therefore recommended a transrectal ultrasound-guided biopsy of the prostate  Prostate Biopsy note: The patient returns to the office today to undergo a transrectal ultrasound-guided biopsy of the prostate secondary to an elevated PSA  Risk and benefits of the procedure were discussed  Informed consent was obtained  The patient's prebiopsy preparation was deemed to be adequate  Antibiotics had been taken as prescribed  If appropriate blood thinners had been placed on hold  The patient completed an enema as prescribed  The patient was placed in the lateral decubitus position  Digital rectal examination was performed revealing a 50-60 gram prostate  Viscous lidocaine jelly was instilled into the rectum  Transrectal ultrasonography was then performed  The prostate measured 95 grams with a significant amount of intravesical protrusion  5 cc of 2% lidocaine were then injected bilaterally between the junction of the base of the prostate and the seminal vesicles  Ultrasound guidance was utilized to place the needle into proper position for the administration of the local anesthetic  A standard 12 core biopsy was then performed  Three cores were taken from the peripheral zone bilaterally in 3 cores from the central zone bilaterally  Overall the patient tolerated the procedure and there were no complications  My overall impression status post transrectal ultrasound and biopsy the prostate secondary to an elevated PSA  I have recommended rest and completing antibiotics  Possible postbiopsy sequelae were discussed including hematuria and hematospermia  I've asked that he return in the next one to 2 weeks to review the results of the biopsy

## 2019-08-27 NOTE — TELEPHONE ENCOUNTER
Pt managed by Karissa Koch called asking when he can resume low dose aspirin which he's held prior to today's biopsy

## 2019-09-11 ENCOUNTER — OFFICE VISIT (OUTPATIENT)
Dept: UROLOGY | Facility: AMBULATORY SURGERY CENTER | Age: 74
End: 2019-09-11
Payer: MEDICARE

## 2019-09-11 VITALS
DIASTOLIC BLOOD PRESSURE: 78 MMHG | BODY MASS INDEX: 27.14 KG/M2 | WEIGHT: 189.6 LBS | SYSTOLIC BLOOD PRESSURE: 136 MMHG | HEIGHT: 70 IN | HEART RATE: 88 BPM

## 2019-09-11 DIAGNOSIS — R97.20 ELEVATED PSA: Primary | ICD-10-CM

## 2019-09-11 PROCEDURE — 99214 OFFICE O/P EST MOD 30 MIN: CPT | Performed by: UROLOGY

## 2019-09-11 NOTE — PROGRESS NOTES
9/11/2019    Marylee Bare  1945  [id number]        Assessment  Elevated PSA, transrectal ultrasound-guided biopsy of the prostate with revealing atypical glands      Discussion  We discussed his prostate biopsy as well as his current PSA and age  I see no indication to repeat a prostate biopsy at this time  I recommend follow-up in 6 months with a repeat PSA  If the PSA continues to rise I would recommend a multiparametric MRI of the prostate prior to considering a 2nd biopsy  If the PSA remains stable follow-up can be in 1 year with a PSA and digital rectal examination  History of Present Illness  68 y o  male with a history of an elevated PSA of 5 97  This prompted a transrectal ultrasound-guided biopsy of the prostate  Ten of 12 cores were negative for prostate cancer  One of these cores had a small focus of high-grade prostatic intraepithelial neoplasia  Two cores revealed some atypical glands  There was no definitive diagnosis of prostate cancer made on this biopsy  We discussed the biopsy results at length in the office today  I provided the patient with reassurance that at 68years of age with this biopsy result it is highly unlikely that he is harboring a significant prostate cancer  He denies any post biopsy sequelae  AUA Symptom Score      Review of Systems  Review of Systems   Constitutional: Negative  HENT: Negative  Eyes: Negative  Respiratory: Negative  Cardiovascular: Negative  Gastrointestinal: Negative  Endocrine: Negative  Genitourinary: Negative  Musculoskeletal: Negative  Skin: Negative  Allergic/Immunologic: Negative  Neurological: Negative  Hematological: Negative  Psychiatric/Behavioral: Negative            Past Medical History  Past Medical History:   Diagnosis Date    Aortic valve disorder     Basal cell carcinoma of nose     removed 07/2015    Hypercholesterolemia     Hyperlipidemia     Mitral valve disorder     Osteoarthritis     Seasonal allergies     resolved 01/21/2015       Past Social History  Past Surgical History:   Procedure Laterality Date    COLONOSCOPY      DENTAL SURGERY      1970's    KNEE SURGERY      OTHER SURGICAL HISTORY      cerumen removal 1st 05/26/2011;   2nd:  08/02/2012    PROSTATE BIOPSY         Past Family History  Family History   Problem Relation Age of Onset    Alcohol abuse Father         independently diagnosed     Heart disease Father     Cancer Father     Hypertension Mother     COPD Mother     Diabetes type II Mother     Hypertension Maternal Grandmother     Coronary artery disease Maternal Grandmother     Diabetes Maternal Grandmother     Lung cancer Maternal Grandmother     Coronary artery disease Family     Stroke Paternal Grandmother     Lung cancer Maternal Grandfather        Past Social history  Social History     Socioeconomic History    Marital status: /Civil Union     Spouse name: Not on file    Number of children: Not on file    Years of education: Not on file    Highest education level: Not on file   Occupational History    Occupation:    Social Needs    Financial resource strain: Not on file    Food insecurity:     Worry: Not on file     Inability: Not on file    Transportation needs:     Medical: Not on file     Non-medical: Not on file   Tobacco Use    Smoking status: Never Smoker    Smokeless tobacco: Never Used   Substance and Sexual Activity    Alcohol use:  Yes     Alcohol/week: 1 0 standard drinks     Types: 1 Glasses of wine per week     Frequency: 2-4 times a month     Drinks per session: 1 or 2     Binge frequency: Never     Comment: socially    Drug use: No    Sexual activity: Not on file   Lifestyle    Physical activity:     Days per week: Not on file     Minutes per session: Not on file    Stress: Not on file   Relationships    Social connections:     Talks on phone: Not on file     Gets together: Not on file Attends Latter-day service: Not on file     Active member of club or organization: Not on file     Attends meetings of clubs or organizations: Not on file     Relationship status: Not on file    Intimate partner violence:     Fear of current or ex partner: Not on file     Emotionally abused: Not on file     Physically abused: Not on file     Forced sexual activity: Not on file   Other Topics Concern    Not on file   Social History Narrative    Advance direct of file     Caffeine use -  He admits to consuming caffeine via coffee; 2-3 cups per week; and tea 2 cups per day     Uses safety belts        Current Medications  Current Outpatient Medications   Medication Sig Dispense Refill    Ascorbic Acid (VITAMIN C ER) 500 MG CPCR Take 1 capsule by mouth daily      ezetimibe (ZETIA) 10 mg tablet Take 1 tablet (10 mg total) by mouth daily for 180 days 30 tablet 5    Multiple Vitamins-Minerals (MULTIVITAMIN ADULT) TABS Take 1 tablet by mouth daily      Omega-3 Fatty Acids (FISH OIL) 1200 MG CAPS Take by mouth daily      Red Yeast Rice 600 MG TABS Take 1 tablet by mouth 2 (two) times a day      betamethasone, augmented, (DIPROLENE-AF) 0 05 % cream APPLY TO AFFECTED ONCE DAILY AS DIRECTED  5    ciprofloxacin (CIPRO) 500 mg tablet TK 1 T PO  Q 12 H X 2 DOSES 1ST DOSE AT LEAST ONE HOUR PRIOR TO BIOPSY  0     No current facility-administered medications for this visit  Allergies  Allergies   Allergen Reactions    Seasonal Ic [Cholestatin] Nasal Congestion       Past Medical History, Social History, Family History, medications and allergies were reviewed      Vitals  Vitals:    09/11/19 1215   BP: 136/78   BP Location: Left arm   Patient Position: Sitting   Cuff Size: Standard   Pulse: 88   Weight: 86 kg (189 lb 9 6 oz)   Height: 5' 10" (1 778 m)       Physical Exam  Physical Exam        Results  No results found for: PSA  No results found for: GLUCOSE, CALCIUM, NA, K, CO2, CL, BUN, CREATININE  No results found for: WBC, HGB, HCT, MCV, PLT      Office Urine Dip  No results found for this or any previous visit (from the past 1 hour(s))  ]      Total visit time was 25 minutes of which over 50% was spent on counseling

## 2019-10-02 ENCOUNTER — TELEPHONE (OUTPATIENT)
Dept: UROLOGY | Facility: MEDICAL CENTER | Age: 74
End: 2019-10-02

## 2019-10-02 NOTE — TELEPHONE ENCOUNTER
Patient of Dr Kyle Aguirre seen in [place] office  Patient advised that he is having some lower abdomen pressure pain and wants to know if it is normal after the biopsy  Patient advised that he does not have any urinating issues just the pressure pain   Please advise

## 2019-10-02 NOTE — TELEPHONE ENCOUNTER
Patient of [facility]  Prostate biopsy done in office on 8/27/2019  Call placed to patient  He states that he is experiencing lower abdominal "pressure/pulling", but is not painful  He denies any difficulty urinating, hematuria, dysuria  I advised patient that this far out from procedure this is likely not related to procedure and he should call to discuss with PCP but I will route to provider for any other recommendation  Patient verbalized understanding

## 2019-10-03 ENCOUNTER — APPOINTMENT (OUTPATIENT)
Dept: LAB | Age: 74
End: 2019-10-03
Payer: MEDICARE

## 2019-10-03 ENCOUNTER — OFFICE VISIT (OUTPATIENT)
Dept: INTERNAL MEDICINE CLINIC | Facility: CLINIC | Age: 74
End: 2019-10-03
Payer: MEDICARE

## 2019-10-03 VITALS
HEIGHT: 70 IN | DIASTOLIC BLOOD PRESSURE: 70 MMHG | SYSTOLIC BLOOD PRESSURE: 130 MMHG | WEIGHT: 191.4 LBS | BODY MASS INDEX: 27.4 KG/M2 | OXYGEN SATURATION: 97 % | TEMPERATURE: 97.7 F | HEART RATE: 102 BPM

## 2019-10-03 DIAGNOSIS — R97.20 BPH WITH ELEVATED PSA: ICD-10-CM

## 2019-10-03 DIAGNOSIS — Z23 NEED FOR INFLUENZA VACCINATION: Primary | ICD-10-CM

## 2019-10-03 DIAGNOSIS — R00.0 TACHYCARDIA: ICD-10-CM

## 2019-10-03 DIAGNOSIS — R53.81 MALAISE AND FATIGUE: ICD-10-CM

## 2019-10-03 DIAGNOSIS — I10 BENIGN ESSENTIAL HTN: ICD-10-CM

## 2019-10-03 DIAGNOSIS — R53.83 MALAISE AND FATIGUE: ICD-10-CM

## 2019-10-03 DIAGNOSIS — N40.0 ENLARGED PROSTATE WITHOUT LOWER URINARY TRACT SYMPTOMS (LUTS): ICD-10-CM

## 2019-10-03 DIAGNOSIS — N40.0 BPH WITH ELEVATED PSA: ICD-10-CM

## 2019-10-03 PROBLEM — R53.82 CHRONIC FATIGUE: Status: ACTIVE | Noted: 2019-10-03

## 2019-10-03 LAB
ALBUMIN SERPL BCP-MCNC: 3.6 G/DL (ref 3.5–5)
ALP SERPL-CCNC: 64 U/L (ref 46–116)
ALT SERPL W P-5'-P-CCNC: 33 U/L (ref 12–78)
ANION GAP SERPL CALCULATED.3IONS-SCNC: 5 MMOL/L (ref 4–13)
AST SERPL W P-5'-P-CCNC: 18 U/L (ref 5–45)
BASOPHILS # BLD AUTO: 0.07 THOUSANDS/ΜL (ref 0–0.1)
BASOPHILS NFR BLD AUTO: 1 % (ref 0–1)
BILIRUB SERPL-MCNC: 0.58 MG/DL (ref 0.2–1)
BILIRUB UR QL STRIP: NEGATIVE
BUN SERPL-MCNC: 11 MG/DL (ref 5–25)
CALCIUM SERPL-MCNC: 9.2 MG/DL (ref 8.3–10.1)
CHLORIDE SERPL-SCNC: 101 MMOL/L (ref 100–108)
CLARITY UR: CLEAR
CO2 SERPL-SCNC: 31 MMOL/L (ref 21–32)
COLOR UR: YELLOW
CREAT SERPL-MCNC: 0.97 MG/DL (ref 0.6–1.3)
CRP SERPL QL: <3 MG/L
EOSINOPHIL # BLD AUTO: 0.57 THOUSAND/ΜL (ref 0–0.61)
EOSINOPHIL NFR BLD AUTO: 11 % (ref 0–6)
ERYTHROCYTE [DISTWIDTH] IN BLOOD BY AUTOMATED COUNT: 12.5 % (ref 11.6–15.1)
GFR SERPL CREATININE-BSD FRML MDRD: 78 ML/MIN/1.73SQ M
GLUCOSE SERPL-MCNC: 99 MG/DL (ref 65–140)
GLUCOSE UR STRIP-MCNC: NEGATIVE MG/DL
HCT VFR BLD AUTO: 45.4 % (ref 36.5–49.3)
HGB BLD-MCNC: 15.2 G/DL (ref 12–17)
HGB UR QL STRIP.AUTO: NEGATIVE
IMM GRANULOCYTES # BLD AUTO: 0.01 THOUSAND/UL (ref 0–0.2)
IMM GRANULOCYTES NFR BLD AUTO: 0 % (ref 0–2)
KETONES UR STRIP-MCNC: NEGATIVE MG/DL
LEUKOCYTE ESTERASE UR QL STRIP: NEGATIVE
LYMPHOCYTES # BLD AUTO: 0.98 THOUSANDS/ΜL (ref 0.6–4.47)
LYMPHOCYTES NFR BLD AUTO: 19 % (ref 14–44)
MCH RBC QN AUTO: 31.7 PG (ref 26.8–34.3)
MCHC RBC AUTO-ENTMCNC: 33.5 G/DL (ref 31.4–37.4)
MCV RBC AUTO: 95 FL (ref 82–98)
MONOCYTES # BLD AUTO: 0.51 THOUSAND/ΜL (ref 0.17–1.22)
MONOCYTES NFR BLD AUTO: 10 % (ref 4–12)
NEUTROPHILS # BLD AUTO: 3.17 THOUSANDS/ΜL (ref 1.85–7.62)
NEUTS SEG NFR BLD AUTO: 59 % (ref 43–75)
NITRITE UR QL STRIP: NEGATIVE
NRBC BLD AUTO-RTO: 0 /100 WBCS
PH UR STRIP.AUTO: 7 [PH]
PLATELET # BLD AUTO: 169 THOUSANDS/UL (ref 149–390)
PMV BLD AUTO: 9.8 FL (ref 8.9–12.7)
POTASSIUM SERPL-SCNC: 3.7 MMOL/L (ref 3.5–5.3)
PROT SERPL-MCNC: 7 G/DL (ref 6.4–8.2)
PROT UR STRIP-MCNC: NEGATIVE MG/DL
RBC # BLD AUTO: 4.79 MILLION/UL (ref 3.88–5.62)
SODIUM SERPL-SCNC: 137 MMOL/L (ref 136–145)
SP GR UR STRIP.AUTO: 1.02 (ref 1–1.03)
UROBILINOGEN UR QL STRIP.AUTO: 0.2 E.U./DL
WBC # BLD AUTO: 5.31 THOUSAND/UL (ref 4.31–10.16)

## 2019-10-03 PROCEDURE — G0008 ADMIN INFLUENZA VIRUS VAC: HCPCS

## 2019-10-03 PROCEDURE — 81003 URINALYSIS AUTO W/O SCOPE: CPT | Performed by: INTERNAL MEDICINE

## 2019-10-03 PROCEDURE — 86140 C-REACTIVE PROTEIN: CPT

## 2019-10-03 PROCEDURE — 80053 COMPREHEN METABOLIC PANEL: CPT

## 2019-10-03 PROCEDURE — 85025 COMPLETE CBC W/AUTO DIFF WBC: CPT

## 2019-10-03 PROCEDURE — 90662 IIV NO PRSV INCREASED AG IM: CPT

## 2019-10-03 PROCEDURE — 36415 COLL VENOUS BLD VENIPUNCTURE: CPT

## 2019-10-03 PROCEDURE — 93000 ELECTROCARDIOGRAM COMPLETE: CPT | Performed by: INTERNAL MEDICINE

## 2019-10-03 PROCEDURE — 99213 OFFICE O/P EST LOW 20 MIN: CPT | Performed by: INTERNAL MEDICINE

## 2019-10-03 NOTE — ASSESSMENT & PLAN NOTE
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

## 2019-10-03 NOTE — ASSESSMENT & PLAN NOTE
Uncertain etiology  We will obtain a CBC, CMP, CRP and urinalysis with reflex to culture  Electrocardiogram obtained in the office did not disclose any ischemic changes

## 2019-10-03 NOTE — PROGRESS NOTES
Assessment/Plan   Diagnoses and all orders for this visit:    Need for influenza vaccination  -     influenza vaccine, [id number], high-dose, PF 0 5 mL (FLUZONE HIGH-DOSE)    Benign essential HTN  -     CBC and differential; Future  -     C-reactive protein; Future  -     Comprehensive metabolic panel; Future  -     UA w Reflex to Microscopic w Reflex to Culture - Clinic Collect    BPH with elevated PSA  -     UA w Reflex to Microscopic w Reflex to Culture - Clinic Collect    Enlarged prostate without lower urinary tract symptoms (luts)  -     C-reactive protein; Future  -     Comprehensive metabolic panel; Future  -     UA w Reflex to Microscopic w Reflex to Culture - Clinic Collect    Malaise and fatigue  -     C-reactive protein; Future  -     Comprehensive metabolic panel; Future  -     UA w Reflex to Microscopic w Reflex to Culture - Clinic Collect          Subjective:      Patient ID: Kvng Tamez is a 68 y o  male  HPI   Patient complains of episodes of extreme fatigue and hunger pains not always relieved by eating  Denies bloating, nausea, SOB, palpitations  Noticed night sweats inconsistently  Prior to these episodes starting, patient commented that he had prostate biopsy and was using topical steroid ointment for eczema from dermatologist   Denies changes in bowel habits  Notes lower abdominal pressure when he wakes up in the morning  No issues with urination       Family History   Problem Relation Age of Onset    Alcohol abuse Father         independently diagnosed     Heart disease Father     Cancer Father     Hypertension Mother     COPD Mother     Diabetes type II Mother     Hypertension Maternal Grandmother     Coronary artery disease Maternal Grandmother     Diabetes Maternal Grandmother     Lung cancer Maternal Grandmother     Coronary artery disease Family     Stroke Paternal Grandmother     Lung cancer Maternal Grandfather      Social History     Socioeconomic History    Marital status: /Civil Union     Spouse name: Not on file    Number of children: Not on file    Years of education: Not on file    Highest education level: Not on file   Occupational History    Occupation:    Social Needs    Financial resource strain: Not on file    Food insecurity:     Worry: Not on file     Inability: Not on file    Transportation needs:     Medical: Not on file     Non-medical: Not on file   Tobacco Use    Smoking status: Never Smoker    Smokeless tobacco: Never Used   Substance and Sexual Activity    Alcohol use:  Yes     Alcohol/week: 1 0 standard drinks     Types: 1 Glasses of wine per week     Frequency: 2-4 times a month     Drinks per session: 1 or 2     Binge frequency: Never     Comment: socially    Drug use: No    Sexual activity: Not on file   Lifestyle    Physical activity:     Days per week: Not on file     Minutes per session: Not on file    Stress: Not on file   Relationships    Social connections:     Talks on phone: Not on file     Gets together: Not on file     Attends [employer] service: Not on file     Active member of club or organization: Not on file     Attends meetings of clubs or organizations: Not on file     Relationship status: Not on file    Intimate partner violence:     Fear of current or ex partner: Not on file     Emotionally abused: Not on file     Physically abused: Not on file     Forced sexual activity: Not on file   Other Topics Concern    Not on file   Social History Narrative    Advance direct of file     Caffeine use -  He admits to consuming caffeine via coffee; 2-3 cups per week; and tea 2 cups per day     Uses safety belts      Past Medical History:   Diagnosis Date    Aortic valve disorder     Basal cell carcinoma of nose     removed 07/2015    Hypercholesterolemia     Hyperlipidemia     Mitral valve disorder     Osteoarthritis     Seasonal allergies     resolved 01/21/2015       Current Outpatient Medications:    Ascorbic Acid (VITAMIN C ER) 500 MG CPCR, Take 1 capsule by mouth daily, Disp: , Rfl:     Multiple Vitamins-Minerals (MULTIVITAMIN ADULT) TABS, Take 1 tablet by mouth daily, Disp: , Rfl:     Omega-3 Fatty Acids (FISH OIL) 1200 MG CAPS, Take 2 capsules by mouth daily , Disp: , Rfl:     Red Yeast Rice 600 MG TABS, Take 1 tablet by mouth 2 (two) times a day, Disp: , Rfl:     betamethasone, augmented, (DIPROLENE-AF) 0 05 % cream, APPLY TO AFFECTED ONCE DAILY AS DIRECTED, Disp: , Rfl: 5    ezetimibe (ZETIA) 10 mg tablet, Take 1 tablet (10 mg total) by mouth daily for 180 days (Patient not taking: Reported on 10/3/2019), Disp: 30 tablet, Rfl: 5  Allergies   Allergen Reactions    Seasonal Ic [Cholestatin] Nasal Congestion     Past Surgical History:   Procedure Laterality Date    COLONOSCOPY      DENTAL SURGERY      1970's    KNEE SURGERY      OTHER SURGICAL HISTORY      cerumen removal 1st 05/26/2011;   2nd:  08/02/2012    PROSTATE BIOPSY             Review of Systems   Constitutional: Positive for appetite change (episodic hunger pains) and fatigue  Respiratory: Negative for shortness of breath  Cardiovascular: Negative for palpitations  Gastrointestinal: Negative for abdominal distention, constipation, diarrhea and nausea  Genitourinary: Negative for frequency and urgency  Objective:      /70 (BP Location: Left arm, Patient Position: Sitting, Cuff Size: Standard)   Pulse 102   Temp 97 7 °F (36 5 °C) (Tympanic)   Ht 5' 10" (1 778 m)   Wt 86 8 kg (191 lb 6 4 oz)   SpO2 97%   BMI 27 46 kg/m²          Physical Exam   Constitutional: He is oriented to person, place, and time  He appears well-developed and well-nourished  HENT:   Head: Normocephalic and atraumatic  Eyes: Pupils are equal, round, and reactive to light  Conjunctivae are normal  No scleral icterus  Neck: No JVD present  No tracheal deviation present  Cardiovascular: Regular rhythm and normal heart sounds  Tachycardia present  No murmur heard  Pulmonary/Chest: Effort normal and breath sounds normal    Abdominal: Soft  Bowel sounds are normal  He exhibits no distension and no mass  There is no tenderness  There is no rebound and no guarding  No hernia  Musculoskeletal: He exhibits no edema or deformity  Lymphadenopathy:     He has no cervical adenopathy  Neurological: He is alert and oriented to person, place, and time  Grossly, no focal motor deficits  Skin: Skin is warm and dry  Psychiatric: He has a normal mood and affect  His behavior is normal    Vitals reviewed

## 2019-10-03 NOTE — ASSESSMENT & PLAN NOTE
Patient presents with episodes of fatigue and hunger pains over the last several weeks  Check labs for adrenal insufficiency, infection

## 2019-10-10 ENCOUNTER — TELEPHONE (OUTPATIENT)
Dept: INTERNAL MEDICINE CLINIC | Facility: CLINIC | Age: 74
End: 2019-10-10

## 2019-10-10 NOTE — TELEPHONE ENCOUNTER
Pt called because he saw his blood work in my chart and he wanted to know if you had something to discuss with him further because for the most part his symptoms are still there

## 2019-11-26 LAB — HCV AB SER-ACNC: NEGATIVE

## 2019-12-04 ENCOUNTER — OFFICE VISIT (OUTPATIENT)
Dept: INTERNAL MEDICINE CLINIC | Facility: CLINIC | Age: 74
End: 2019-12-04
Payer: MEDICARE

## 2019-12-04 VITALS
HEIGHT: 71 IN | OXYGEN SATURATION: 97 % | WEIGHT: 191.6 LBS | HEART RATE: 86 BPM | SYSTOLIC BLOOD PRESSURE: 120 MMHG | BODY MASS INDEX: 26.82 KG/M2 | TEMPERATURE: 98.4 F | DIASTOLIC BLOOD PRESSURE: 68 MMHG

## 2019-12-04 DIAGNOSIS — E78.00 HYPERCHOLESTEROLEMIA: ICD-10-CM

## 2019-12-04 DIAGNOSIS — I10 BENIGN ESSENTIAL HTN: ICD-10-CM

## 2019-12-04 DIAGNOSIS — R05.9 COUGH: ICD-10-CM

## 2019-12-04 DIAGNOSIS — N40.0 ENLARGED PROSTATE WITHOUT LOWER URINARY TRACT SYMPTOMS (LUTS): Primary | ICD-10-CM

## 2019-12-04 PROCEDURE — 99214 OFFICE O/P EST MOD 30 MIN: CPT | Performed by: INTERNAL MEDICINE

## 2019-12-04 RX ORDER — EZETIMIBE 10 MG/1
10 TABLET ORAL DAILY
Qty: 30 TABLET | Refills: 5 | Status: SHIPPED | OUTPATIENT
Start: 2019-12-04 | End: 2020-11-30 | Stop reason: SDUPTHER

## 2019-12-04 NOTE — PROGRESS NOTES
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 for which repeat biopsy was suggested  Followed by Urology  Benign essential HTN  Blood pressure is been nicely controlled  Cough  Possibly due to some silent reflux  Will continue to monitor  It was suggested to the patient to trial some over-the-counter famotidine at bedtime  If cough persists we will pursue an evaluation       Diagnoses and all orders for this visit:    Enlarged prostate without lower urinary tract symptoms (luts)    Hypercholesterolemia  -     ezetimibe (ZETIA) 10 mg tablet; Take 1 tablet (10 mg total) by mouth daily  -     Lipid panel; Future  -     CBC and differential; Future  -     Comprehensive metabolic panel; Future    Benign essential HTN    Cough        Dry Cough: Most likely 2' to post nasal drip since it is unproductive and not associated with fever, weight loss, or productive output  If associated w/ meals in temporality consider taking a Pepcid AC  Subjective:      Patient ID: Beau Madera is a 68 y o  male   The patient presents for a follow-up visit  He has a PHMx of HLD, BPH, HTN  Pt states that he has had a dry cough for the last 2-3 months  He states that the cough is not associated with food consumption  He denies productive sputum, fevers, night sweats, weight loss, chest pain, back pain  He recently underwent a series of prostate biopsies  Pathology was reviewed  Continued surveillance is in order  He is being followed by Urology in this regard  He is not having any specific symptoms of BPH at this time  His lab work was reviewed  His lipid profile is much better with the use of Zetia  He continues to use of red yeast rice as well    We will re-evaluate in 6 months and if his lipid number for remain at the present level will discontinue his red yeast rice and then follow up again  He reports no symptoms of muscle aches or myalgias  His blood pressure is nicely controlled without medication at this time  The patient is being careful of sodium intake  The following portions of the patient's history were reviewed and updated as appropriate: past family history, past medical history, past social history, past surgical history and problem list     Review of Systems   Respiratory: Negative for choking, chest tightness and wheezing  All other systems reviewed and are negative  Objective:      /68 (BP Location: Left arm, Patient Position: Sitting, Cuff Size: Standard)   Pulse 86   Temp 98 4 °F (36 9 °C) (Oral)   Ht 5' 10 55" (1 792 m)   Wt 86 9 kg (191 lb 9 6 oz)   SpO2 97%   BMI 27 06 kg/m²          Physical Exam   Constitutional: He is oriented to person, place, and time  He appears well-developed and well-nourished  No distress  HENT:   Head: Normocephalic and atraumatic  Eyes: Conjunctivae are normal    Cardiovascular: Normal rate, regular rhythm, normal heart sounds and intact distal pulses  Exam reveals no gallop and no friction rub  No murmur heard  Pulmonary/Chest: Effort normal and breath sounds normal  No stridor  No respiratory distress  Neurological: He is alert and oriented to person, place, and time  Skin: Skin is warm and dry  He is not diaphoretic

## 2019-12-05 PROBLEM — R05.9 COUGH: Status: ACTIVE | Noted: 2019-12-05

## 2019-12-05 NOTE — ASSESSMENT & PLAN NOTE
Possibly due to some silent reflux  Will continue to monitor  It was suggested to the patient to trial some over-the-counter famotidine at bedtime    If cough persists we will pursue an evaluation

## 2019-12-05 NOTE — ASSESSMENT & PLAN NOTE
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

## 2020-03-17 ENCOUNTER — TELEPHONE (OUTPATIENT)
Dept: UROLOGY | Facility: AMBULATORY SURGERY CENTER | Age: 75
End: 2020-03-17

## 2020-03-17 DIAGNOSIS — R97.20 ELEVATED PSA: Primary | ICD-10-CM

## 2020-05-12 ENCOUNTER — OFFICE VISIT (OUTPATIENT)
Dept: INTERNAL MEDICINE CLINIC | Facility: CLINIC | Age: 75
End: 2020-05-12
Payer: MEDICARE

## 2020-05-12 VITALS
HEIGHT: 70 IN | WEIGHT: 186.2 LBS | OXYGEN SATURATION: 97 % | DIASTOLIC BLOOD PRESSURE: 70 MMHG | BODY MASS INDEX: 26.66 KG/M2 | SYSTOLIC BLOOD PRESSURE: 116 MMHG | TEMPERATURE: 98.4 F | HEART RATE: 104 BPM

## 2020-05-12 DIAGNOSIS — R97.20 BPH WITH ELEVATED PSA: ICD-10-CM

## 2020-05-12 DIAGNOSIS — H61.23 BILATERAL IMPACTED CERUMEN: ICD-10-CM

## 2020-05-12 DIAGNOSIS — E78.00 HYPERCHOLESTEROLEMIA: Primary | ICD-10-CM

## 2020-05-12 DIAGNOSIS — N40.0 BPH WITH ELEVATED PSA: ICD-10-CM

## 2020-05-12 DIAGNOSIS — K21.9 GASTROESOPHAGEAL REFLUX DISEASE WITHOUT ESOPHAGITIS: ICD-10-CM

## 2020-05-12 DIAGNOSIS — M17.12 PRIMARY OSTEOARTHRITIS OF LEFT KNEE: ICD-10-CM

## 2020-05-12 PROCEDURE — 1160F RVW MEDS BY RX/DR IN RCRD: CPT | Performed by: INTERNAL MEDICINE

## 2020-05-12 PROCEDURE — 1036F TOBACCO NON-USER: CPT | Performed by: INTERNAL MEDICINE

## 2020-05-12 PROCEDURE — 3078F DIAST BP <80 MM HG: CPT | Performed by: INTERNAL MEDICINE

## 2020-05-12 PROCEDURE — 3074F SYST BP LT 130 MM HG: CPT | Performed by: INTERNAL MEDICINE

## 2020-05-12 PROCEDURE — 69210 REMOVE IMPACTED EAR WAX UNI: CPT | Performed by: INTERNAL MEDICINE

## 2020-05-12 PROCEDURE — 99214 OFFICE O/P EST MOD 30 MIN: CPT | Performed by: INTERNAL MEDICINE

## 2020-05-12 PROCEDURE — 3008F BODY MASS INDEX DOCD: CPT | Performed by: INTERNAL MEDICINE

## 2020-05-12 PROCEDURE — 4040F PNEUMOC VAC/ADMIN/RCVD: CPT | Performed by: INTERNAL MEDICINE

## 2020-05-12 RX ORDER — ASPIRIN 81 MG/1
81 TABLET ORAL DAILY
COMMUNITY

## 2020-06-22 ENCOUNTER — OFFICE VISIT (OUTPATIENT)
Dept: UROLOGY | Facility: AMBULATORY SURGERY CENTER | Age: 75
End: 2020-06-22
Payer: MEDICARE

## 2020-06-22 VITALS
HEART RATE: 85 BPM | SYSTOLIC BLOOD PRESSURE: 124 MMHG | TEMPERATURE: 97.8 F | DIASTOLIC BLOOD PRESSURE: 76 MMHG | WEIGHT: 182 LBS | HEIGHT: 70 IN | BODY MASS INDEX: 26.05 KG/M2

## 2020-06-22 DIAGNOSIS — R97.20 BPH WITH ELEVATED PSA: Primary | ICD-10-CM

## 2020-06-22 DIAGNOSIS — R97.20 ELEVATED PSA: ICD-10-CM

## 2020-06-22 DIAGNOSIS — N40.0 BPH WITH ELEVATED PSA: Primary | ICD-10-CM

## 2020-06-22 LAB — POST-VOID RESIDUAL VOLUME, ML POC: 31 ML

## 2020-06-22 PROCEDURE — 3008F BODY MASS INDEX DOCD: CPT | Performed by: NURSE PRACTITIONER

## 2020-06-22 PROCEDURE — 4040F PNEUMOC VAC/ADMIN/RCVD: CPT | Performed by: NURSE PRACTITIONER

## 2020-06-22 PROCEDURE — 1160F RVW MEDS BY RX/DR IN RCRD: CPT | Performed by: NURSE PRACTITIONER

## 2020-06-22 PROCEDURE — 1036F TOBACCO NON-USER: CPT | Performed by: NURSE PRACTITIONER

## 2020-06-22 PROCEDURE — 99213 OFFICE O/P EST LOW 20 MIN: CPT | Performed by: NURSE PRACTITIONER

## 2020-06-22 PROCEDURE — 3078F DIAST BP <80 MM HG: CPT | Performed by: NURSE PRACTITIONER

## 2020-06-22 PROCEDURE — 3074F SYST BP LT 130 MM HG: CPT | Performed by: NURSE PRACTITIONER

## 2020-06-22 PROCEDURE — 51798 US URINE CAPACITY MEASURE: CPT | Performed by: NURSE PRACTITIONER

## 2020-06-22 RX ORDER — EZETIMIBE 10 MG/1
TABLET ORAL
COMMUNITY
Start: 2020-05-19 | End: 2020-09-22 | Stop reason: SDUPTHER

## 2020-07-07 DIAGNOSIS — E78.00 HYPERCHOLESTEROLEMIA: Primary | ICD-10-CM

## 2020-07-09 RX ORDER — EZETIMIBE 10 MG/1
TABLET ORAL
Qty: 90 TABLET | Refills: 3 | Status: SHIPPED | OUTPATIENT
Start: 2020-07-09 | End: 2020-09-22 | Stop reason: SDUPTHER

## 2020-08-20 PROCEDURE — 88305 TISSUE EXAM BY PATHOLOGIST: CPT | Performed by: STUDENT IN AN ORGANIZED HEALTH CARE EDUCATION/TRAINING PROGRAM

## 2020-08-21 ENCOUNTER — LAB REQUISITION (OUTPATIENT)
Dept: LAB | Facility: HOSPITAL | Age: 75
End: 2020-08-21
Payer: MEDICARE

## 2020-08-21 DIAGNOSIS — D48.5 NEOPLASM OF UNCERTAIN BEHAVIOR OF SKIN: ICD-10-CM

## 2020-09-22 ENCOUNTER — OFFICE VISIT (OUTPATIENT)
Dept: INTERNAL MEDICINE CLINIC | Facility: CLINIC | Age: 75
End: 2020-09-22

## 2020-09-22 VITALS
DIASTOLIC BLOOD PRESSURE: 80 MMHG | TEMPERATURE: 98.4 F | HEIGHT: 71 IN | BODY MASS INDEX: 26.4 KG/M2 | SYSTOLIC BLOOD PRESSURE: 142 MMHG | OXYGEN SATURATION: 98 % | WEIGHT: 188.6 LBS | HEART RATE: 75 BPM

## 2020-09-22 DIAGNOSIS — M62.838 NECK MUSCLE SPASM: ICD-10-CM

## 2020-09-22 DIAGNOSIS — I10 BENIGN ESSENTIAL HTN: Primary | ICD-10-CM

## 2020-09-22 PROCEDURE — 99213 OFFICE O/P EST LOW 20 MIN: CPT | Performed by: INTERNAL MEDICINE

## 2020-09-22 RX ORDER — KETOCONAZOLE 20 MG/ML
SHAMPOO TOPICAL AS NEEDED
COMMUNITY
Start: 2020-07-10

## 2020-09-22 NOTE — PROGRESS NOTES
Assessment/Plan:    Neck muscle spasm  The medic treatment with heating pad, massage, magnesium salicylate over-the-counter  Benign essential HTN  Continue monitoring period re-evaluate if sustained blood pressure elevation       Diagnoses and all orders for this visit:    Benign essential HTN    Neck muscle spasm    Other orders  -     ketoconazole (NIZORAL) 2 % shampoo          Subjective:      Patient ID: Caro Tripp is a 69 y o  male  Patient was concerned because of a sudden elevation in his home blood pressure readings in the range of 101 in 464 systolics with no obvious reason patient does state has been experiencing some right-sided neck muscle spasms over the past several days but does not see how that would correlate with an increase in his blood pressure  His blood pressure was rechecked several times in the office and the lowest reading obtained was 142/80 which is still about 10 or 15 points higher than his usual readings at home  He reports no other symptomatology  No vision changes, no slurred speech, no facial droop, no weakness        Family History   Problem Relation Age of Onset    Alcohol abuse Father         independently diagnosed     Heart disease Father     Cancer Father     Hypertension Mother     COPD Mother     Diabetes type II Mother     Hypertension Maternal Grandmother     Coronary artery disease Maternal Grandmother     Diabetes Maternal Grandmother     Lung cancer Maternal Grandmother     Coronary artery disease Family     Stroke Paternal Grandmother     Lung cancer Maternal Grandfather      Social History     Socioeconomic History    Marital status: /Civil Union     Spouse name: Not on file    Number of children: Not on file    Years of education: Not on file    Highest education level: Not on file   Occupational History    Occupation:    Social Needs    Financial resource strain: Not on file    Food insecurity     Worry: Not on file Inability: Not on file    Transportation needs     Medical: Not on file     Non-medical: Not on file   Tobacco Use    Smoking status: Never Smoker    Smokeless tobacco: Never Used   Substance and Sexual Activity    Alcohol use:  Yes     Alcohol/week: 1 0 standard drinks     Types: 1 Glasses of wine per week     Frequency: 2-4 times a month     Drinks per session: 1 or 2     Binge frequency: Never     Comment: socially    Drug use: No    Sexual activity: Not on file   Lifestyle    Physical activity     Days per week: Not on file     Minutes per session: Not on file    Stress: Not on file   Relationships    Social connections     Talks on phone: Not on file     Gets together: Not on file     Attends [employer]ism service: Not on file     Active member of club or organization: Not on file     Attends meetings of clubs or organizations: Not on file     Relationship status: Not on file    Intimate partner violence     Fear of current or ex partner: Not on file     Emotionally abused: Not on file     Physically abused: Not on file     Forced sexual activity: Not on file   Other Topics Concern    Not on file   Social History Narrative    Advance direct of file     Caffeine use -  He admits to consuming caffeine via coffee; 2-3 cups per week; and tea 2 cups per day     Uses safety belts      Past Medical History:   Diagnosis Date    Aortic valve disorder     Basal cell carcinoma of nose     removed 07/2015    Hypercholesterolemia     Hyperlipidemia     Mitral valve disorder     Osteoarthritis     Seasonal allergies     resolved 01/21/2015       Current Outpatient Medications:     Ascorbic Acid (VITAMIN C ER) 500 MG CPCR, Take 1 capsule by mouth daily, Disp: , Rfl:     aspirin (ECOTRIN LOW STRENGTH) 81 mg EC tablet, Take 81 mg by mouth daily, Disp: , Rfl:     ezetimibe (ZETIA) 10 mg tablet, Take 1 tablet (10 mg total) by mouth daily, Disp: 30 tablet, Rfl: 5    ketoconazole (NIZORAL) 2 % shampoo, , Disp: , Rfl:     Multiple Vitamins-Minerals (MULTIVITAMIN ADULT) TABS, Take 1 tablet by mouth daily, Disp: , Rfl:     Omega-3 Fatty Acids (FISH OIL) 1200 MG CAPS, Take 2 capsules by mouth daily , Disp: , Rfl:     betamethasone, augmented, (DIPROLENE-AF) 0 05 % cream, APPLY TO AFFECTED ONCE DAILY AS DIRECTED, Disp: , Rfl: 5    Red Yeast Rice 600 MG TABS, Take 1 tablet by mouth 2 (two) times a day, Disp: , Rfl:   Allergies   Allergen Reactions    Seasonal Ic [Cholestatin] Nasal Congestion     Past Surgical History:   Procedure Laterality Date    COLONOSCOPY      DENTAL SURGERY      1970's    KNEE SURGERY      OTHER SURGICAL HISTORY      cerumen removal 1st 05/26/2011;   2nd:  08/02/2012    PROSTATE BIOPSY           Review of Systems   Constitutional: Negative  HENT: Negative  Eyes: Negative  Respiratory: Negative  Cardiovascular: Negative  Gastrointestinal: Negative  Endocrine: Negative  Genitourinary: Negative  Musculoskeletal: Positive for myalgias (Right side of the neck)  Skin: Negative  Allergic/Immunologic: Negative  Neurological: Negative  Hematological: Negative  Psychiatric/Behavioral: Negative  Objective:      /80 (BP Location: Left arm, Patient Position: Sitting, Cuff Size: Standard)   Pulse 75   Temp 98 4 °F (36 9 °C) (Temporal)   Ht 5' 10 59" (1 793 m)   Wt 85 5 kg (188 lb 9 6 oz)   SpO2 98%   BMI 26 61 kg/m²          Physical Exam  Vitals signs reviewed  Constitutional:       General: He is not in acute distress  Appearance: He is normal weight  He is not ill-appearing, toxic-appearing or diaphoretic  HENT:      Head: Normocephalic and atraumatic  Right Ear: External ear normal       Left Ear: External ear normal       Nose: Nose normal       Mouth/Throat:      Mouth: Mucous membranes are moist    Eyes:      General: No scleral icterus       Conjunctiva/sclera: Conjunctivae normal       Pupils: Pupils are equal, round, and reactive to light  Neck:      Musculoskeletal: Normal range of motion and neck supple  No neck rigidity or muscular tenderness  Cardiovascular:      Rate and Rhythm: Normal rate and regular rhythm  Heart sounds: Normal heart sounds  No murmur  Pulmonary:      Effort: Pulmonary effort is normal  No respiratory distress  Breath sounds: Normal breath sounds  No rales  Abdominal:      General: Abdomen is flat  There is no distension  Tenderness: There is no abdominal tenderness  Musculoskeletal: Normal range of motion  General: No swelling or signs of injury  Right lower leg: No edema  Left lower leg: No edema  Lymphadenopathy:      Cervical: No cervical adenopathy  Skin:     General: Skin is warm  Coloration: Skin is not jaundiced  Findings: No rash  Neurological:      General: No focal deficit present  Mental Status: He is alert and oriented to person, place, and time  Mental status is at baseline  Psychiatric:         Mood and Affect: Mood normal          Behavior: Behavior normal          Thought Content:  Thought content normal          Judgment: Judgment normal

## 2020-11-10 ENCOUNTER — OFFICE VISIT (OUTPATIENT)
Dept: INTERNAL MEDICINE CLINIC | Facility: CLINIC | Age: 75
End: 2020-11-10
Payer: MEDICARE

## 2020-11-10 VITALS
DIASTOLIC BLOOD PRESSURE: 68 MMHG | BODY MASS INDEX: 26.57 KG/M2 | HEIGHT: 71 IN | HEART RATE: 87 BPM | TEMPERATURE: 97.6 F | SYSTOLIC BLOOD PRESSURE: 124 MMHG | OXYGEN SATURATION: 97 % | WEIGHT: 189.8 LBS

## 2020-11-10 DIAGNOSIS — I10 BENIGN ESSENTIAL HTN: ICD-10-CM

## 2020-11-10 DIAGNOSIS — E78.00 HYPERCHOLESTEROLEMIA: ICD-10-CM

## 2020-11-10 DIAGNOSIS — Z00.00 MEDICARE ANNUAL WELLNESS VISIT, SUBSEQUENT: Primary | ICD-10-CM

## 2020-11-10 PROCEDURE — 99213 OFFICE O/P EST LOW 20 MIN: CPT | Performed by: INTERNAL MEDICINE

## 2020-11-10 PROCEDURE — G0439 PPPS, SUBSEQ VISIT: HCPCS | Performed by: INTERNAL MEDICINE

## 2020-11-30 DIAGNOSIS — E78.00 HYPERCHOLESTEROLEMIA: ICD-10-CM

## 2020-11-30 RX ORDER — EZETIMIBE 10 MG/1
10 TABLET ORAL DAILY
Qty: 90 TABLET | Refills: 1 | Status: SHIPPED | OUTPATIENT
Start: 2020-11-30 | End: 2021-05-18 | Stop reason: SDUPTHER

## 2020-12-22 ENCOUNTER — OFFICE VISIT (OUTPATIENT)
Dept: UROLOGY | Facility: AMBULATORY SURGERY CENTER | Age: 75
End: 2020-12-22
Payer: MEDICARE

## 2020-12-22 VITALS
HEART RATE: 92 BPM | HEIGHT: 71 IN | BODY MASS INDEX: 26.54 KG/M2 | WEIGHT: 189.6 LBS | DIASTOLIC BLOOD PRESSURE: 80 MMHG | SYSTOLIC BLOOD PRESSURE: 140 MMHG

## 2020-12-22 DIAGNOSIS — R97.20 ELEVATED PSA: Primary | ICD-10-CM

## 2020-12-22 PROCEDURE — 99214 OFFICE O/P EST MOD 30 MIN: CPT | Performed by: NURSE PRACTITIONER

## 2021-01-20 DIAGNOSIS — Z23 ENCOUNTER FOR IMMUNIZATION: ICD-10-CM

## 2021-01-22 ENCOUNTER — HOSPITAL ENCOUNTER (OUTPATIENT)
Dept: RADIOLOGY | Age: 76
Discharge: HOME | End: 2021-01-22
Payer: MEDICARE

## 2021-01-22 DIAGNOSIS — R97.20 ELEVATED PSA: ICD-10-CM

## 2021-01-22 PROCEDURE — A9585 GADOBUTROL INJECTION: HCPCS | Performed by: NURSE PRACTITIONER

## 2021-01-22 PROCEDURE — G1004 CDSM NDSC: HCPCS

## 2021-01-22 PROCEDURE — 76377 3D RENDER W/INTRP POSTPROCES: CPT

## 2021-01-22 PROCEDURE — 72197 MRI PELVIS W/O & W/DYE: CPT

## 2021-01-22 RX ADMIN — GADOBUTROL 8 ML: 604.72 INJECTION INTRAVENOUS at 12:29

## 2021-01-24 ENCOUNTER — IMMUNIZATIONS (OUTPATIENT)
Dept: FAMILY MEDICINE CLINIC | Facility: HOSPITAL | Age: 76
End: 2021-01-24

## 2021-01-24 DIAGNOSIS — Z23 ENCOUNTER FOR IMMUNIZATION: Primary | ICD-10-CM

## 2021-01-24 PROCEDURE — 91301 SARS-COV-2 / COVID-19 MRNA VACCINE (MODERNA) 100 MCG: CPT

## 2021-01-24 PROCEDURE — 0011A SARS-COV-2 / COVID-19 MRNA VACCINE (MODERNA) 100 MCG: CPT

## 2021-01-27 ENCOUNTER — TELEPHONE (OUTPATIENT)
Dept: UROLOGY | Facility: AMBULATORY SURGERY CENTER | Age: 76
End: 2021-01-27

## 2021-01-27 NOTE — TELEPHONE ENCOUNTER
Patient last seen Dominga Bernstein [facility][note 3], Rhoda Aguirre from Radiology called in to report MRI significant findings   Per Rhoda Aguirre, radiology doctor can be reached [phone]

## 2021-02-02 ENCOUNTER — TELEPHONE (OUTPATIENT)
Dept: OTHER | Facility: OTHER | Age: 76
End: 2021-02-02

## 2021-02-03 ENCOUNTER — TELEMEDICINE (OUTPATIENT)
Dept: UROLOGY | Facility: AMBULATORY SURGERY CENTER | Age: 76
End: 2021-02-03

## 2021-02-03 DIAGNOSIS — R97.20 ELEVATED PSA: Primary | ICD-10-CM

## 2021-02-03 PROCEDURE — 99214 OFFICE O/P EST MOD 30 MIN: CPT | Performed by: UROLOGY

## 2021-02-03 RX ORDER — LORAZEPAM 2 MG/1
2 TABLET ORAL ONCE
Qty: 1 TABLET | Refills: 0 | Status: ON HOLD | OUTPATIENT
Start: 2021-02-03 | End: 2021-04-06 | Stop reason: ALTCHOICE

## 2021-02-03 RX ORDER — CIPROFLOXACIN 500 MG/1
500 TABLET, FILM COATED ORAL EVERY 12 HOURS SCHEDULED
Qty: 2 TABLET | Refills: 0 | Status: SHIPPED | OUTPATIENT
Start: 2021-02-03 | End: 2021-02-04

## 2021-02-03 NOTE — TELEPHONE ENCOUNTER
PT is returning a call regarding his appointment  Said he is able to switch his appointment to 2pm tomorrow afternoon

## 2021-02-03 NOTE — PROGRESS NOTES
Virtual Regular Visit      Assessment/Plan:    Impression:  Elevated PSA, PiRads 5 mpMRI prostate     Plan:  Based on mpMRI  Of the prostate I recommend proceeding with an MRI fusion biopsy of the prostate  Risk and benefits of the procedure were discussed and reviewed  Ativan was prescribed to take 1 hour prior to the biopsy  He does not require IV sedation  He understands that I will not be performing the biopsy and that 1 of my partners will meet him and consent him prior to the procedure  I  Have  Prescribed Cipro   500 mg b i d  to take prior to and after the biopsy  Problem List Items Addressed This Visit     None               Reason for visit is   Chief Complaint   Patient presents with    Virtual Regular Visit        Encounter provider Natalie Brooks MD    Provider located at [place] [address]  [address] [address]      Recent Visits  Date Type Provider Dept   01/27/21 Telephone [facility]   Showing recent visits within past 7 days and meeting all other requirements     Future Appointments  No visits were found meeting these conditions  Showing future appointments within next 150 days and meeting all other requirements        The patient was identified by name and date of birth  Shannon Rosa was informed that this is a telemedicine visit and that the visit is being conducted through [employer] [address] [place] [address] and patient was informed that this is not a secure, HIPAA-compliant platform  He agrees to proceed     My office door was closed  No one else was in the room  He acknowledged consent and understanding of privacy and security of the video platform  The patient has agreed to participate and understands they can discontinue the visit at any time  Patient is aware this is a billable service  Subjective  Shannon Rosa is a 77 y o  male   With a history of a PSA of approximately 5   This prompted a transrectal ultrasound-guided biopsy of the prostate performed  In August 2019  Pathology revealed benign tissue with 2 biopsies revealing atypical cells  More recently his PSA chantal to 6 75  This prompted a multiparametric MRI of the prostate which was recently performed  He presents today for a face time virtual visit  We discussed his elevated PSA as well as  The results of his multiparametric MRI of the prostate revealing PIRADS 5 scoring  HPI     Past Medical History:   Diagnosis Date    Aortic valve disorder     Basal cell carcinoma of nose     removed 07/2015    Hypercholesterolemia     Hyperlipidemia     Mitral valve disorder     Osteoarthritis     Seasonal allergies     resolved 01/21/2015       Past Surgical History:   Procedure Laterality Date    COLONOSCOPY      DENTAL SURGERY      1970's    KNEE SURGERY      OTHER SURGICAL HISTORY      cerumen removal 1st 05/26/2011;   2nd:  08/02/2012    PROSTATE BIOPSY         Current Outpatient Medications   Medication Sig Dispense Refill    Ascorbic Acid (VITAMIN C ER) 500 MG CPCR Take 1 capsule by mouth daily      aspirin (ECOTRIN LOW STRENGTH) 81 mg EC tablet Take 81 mg by mouth daily      ezetimibe (ZETIA) 10 mg tablet Take 1 tablet (10 mg total) by mouth daily 90 tablet 1    ketoconazole (NIZORAL) 2 % shampoo       Multiple Vitamins-Minerals (MULTIVITAMIN ADULT) TABS Take 1 tablet by mouth daily      Omega-3 Fatty Acids (FISH OIL) 1200 MG CAPS Take 2 capsules by mouth daily        No current facility-administered medications for this visit  No Known Allergies    Review of Systems    Video Exam    There were no vitals filed for this visit  Physical Exam     I spent Twenty minutes directly with the patient during this visit      VIRTUAL VISIT DISCLAIMER    Álvarez Marcus acknowledges that he has consented to an online visit or consultation   He understands that the online visit is based solely on information provided by him, and that, in the absence of a face-to-face physical evaluation by the physician, the diagnosis he receives is both limited and provisional in terms of accuracy and completeness  This is not intended to replace a full medical face-to-face evaluation by the physician  Abbey Mcallister understands and accepts these terms

## 2021-02-09 ENCOUNTER — TELEPHONE (OUTPATIENT)
Dept: UROLOGY | Facility: MEDICAL CENTER | Age: 76
End: 2021-02-09

## 2021-02-09 NOTE — TELEPHONE ENCOUNTER
----- Message from Ekaterina Clarke sent at 2/3/2021  4:31 PM EST -----  Regarding: MRI FUSION BIOPSY  Return for MRI fusion biopsy of the prostate, results with FT

## 2021-02-12 NOTE — TELEPHONE ENCOUNTER
I spoke to the patient and scheduled his procedure as  TransP MRI Fusion under IV Sed with Dr Ruben Acosta

## 2021-02-20 ENCOUNTER — IMMUNIZATIONS (OUTPATIENT)
Dept: FAMILY MEDICINE CLINIC | Facility: HOSPITAL | Age: 76
End: 2021-02-20

## 2021-02-20 DIAGNOSIS — Z23 ENCOUNTER FOR IMMUNIZATION: Primary | ICD-10-CM

## 2021-02-20 PROCEDURE — 91301 SARS-COV-2 / COVID-19 MRNA VACCINE (MODERNA) 100 MCG: CPT

## 2021-02-20 PROCEDURE — 0012A SARS-COV-2 / COVID-19 MRNA VACCINE (MODERNA) 100 MCG: CPT

## 2021-02-22 NOTE — TELEPHONE ENCOUNTER
Patient called stating he received his packet for surgery        Patient can be reached at [phone]

## 2021-03-29 ENCOUNTER — APPOINTMENT (OUTPATIENT)
Dept: LAB | Age: 76
End: 2021-03-29
Payer: MEDICARE

## 2021-03-29 DIAGNOSIS — Z01.812 PRE-OPERATIVE LABORATORY EXAMINATION: ICD-10-CM

## 2021-03-29 DIAGNOSIS — R97.20 ELEVATED PSA: ICD-10-CM

## 2021-03-29 LAB
ALBUMIN SERPL BCP-MCNC: 3.6 G/DL (ref 3.5–5)
ALP SERPL-CCNC: 65 U/L (ref 46–116)
ALT SERPL W P-5'-P-CCNC: 35 U/L (ref 12–78)
ANION GAP SERPL CALCULATED.3IONS-SCNC: 7 MMOL/L (ref 4–13)
AST SERPL W P-5'-P-CCNC: 26 U/L (ref 5–45)
BASOPHILS # BLD AUTO: 0.05 THOUSANDS/ΜL (ref 0–0.1)
BASOPHILS NFR BLD AUTO: 1 % (ref 0–1)
BILIRUB SERPL-MCNC: 0.67 MG/DL (ref 0.2–1)
BUN SERPL-MCNC: 15 MG/DL (ref 5–25)
CALCIUM SERPL-MCNC: 8.8 MG/DL (ref 8.3–10.1)
CHLORIDE SERPL-SCNC: 108 MMOL/L (ref 100–108)
CO2 SERPL-SCNC: 26 MMOL/L (ref 21–32)
CREAT SERPL-MCNC: 0.96 MG/DL (ref 0.6–1.3)
EOSINOPHIL # BLD AUTO: 0.49 THOUSAND/ΜL (ref 0–0.61)
EOSINOPHIL NFR BLD AUTO: 12 % (ref 0–6)
ERYTHROCYTE [DISTWIDTH] IN BLOOD BY AUTOMATED COUNT: 12.9 % (ref 11.6–15.1)
GFR SERPL CREATININE-BSD FRML MDRD: 78 ML/MIN/1.73SQ M
GLUCOSE P FAST SERPL-MCNC: 94 MG/DL (ref 65–99)
HCT VFR BLD AUTO: 46.4 % (ref 36.5–49.3)
HGB BLD-MCNC: 15.4 G/DL (ref 12–17)
IMM GRANULOCYTES # BLD AUTO: 0 THOUSAND/UL (ref 0–0.2)
IMM GRANULOCYTES NFR BLD AUTO: 0 % (ref 0–2)
LYMPHOCYTES # BLD AUTO: 0.92 THOUSANDS/ΜL (ref 0.6–4.47)
LYMPHOCYTES NFR BLD AUTO: 22 % (ref 14–44)
MCH RBC QN AUTO: 31.7 PG (ref 26.8–34.3)
MCHC RBC AUTO-ENTMCNC: 33.2 G/DL (ref 31.4–37.4)
MCV RBC AUTO: 96 FL (ref 82–98)
MONOCYTES # BLD AUTO: 0.42 THOUSAND/ΜL (ref 0.17–1.22)
MONOCYTES NFR BLD AUTO: 10 % (ref 4–12)
NEUTROPHILS # BLD AUTO: 2.38 THOUSANDS/ΜL (ref 1.85–7.62)
NEUTS SEG NFR BLD AUTO: 55 % (ref 43–75)
NRBC BLD AUTO-RTO: 0 /100 WBCS
PLATELET # BLD AUTO: 166 THOUSANDS/UL (ref 149–390)
PMV BLD AUTO: 10.1 FL (ref 8.9–12.7)
POTASSIUM SERPL-SCNC: 4.1 MMOL/L (ref 3.5–5.3)
PROT SERPL-MCNC: 7.2 G/DL (ref 6.4–8.2)
RBC # BLD AUTO: 4.86 MILLION/UL (ref 3.88–5.62)
SODIUM SERPL-SCNC: 141 MMOL/L (ref 136–145)
WBC # BLD AUTO: 4.26 THOUSAND/UL (ref 4.31–10.16)

## 2021-03-29 PROCEDURE — 36415 COLL VENOUS BLD VENIPUNCTURE: CPT

## 2021-03-29 PROCEDURE — 85025 COMPLETE CBC W/AUTO DIFF WBC: CPT

## 2021-03-29 PROCEDURE — 80053 COMPREHEN METABOLIC PANEL: CPT

## 2021-04-06 ENCOUNTER — HOSPITAL ENCOUNTER (OUTPATIENT)
Facility: HOSPITAL | Age: 76
Setting detail: OUTPATIENT SURGERY
Discharge: HOME | End: 2021-04-06
Attending: UROLOGY | Admitting: UROLOGY
Payer: MEDICARE

## 2021-04-06 ENCOUNTER — TELEPHONE (OUTPATIENT)
Dept: UROLOGY | Facility: AMBULATORY SURGERY CENTER | Age: 76
End: 2021-04-06

## 2021-04-06 ENCOUNTER — ANESTHESIA EVENT (OUTPATIENT)
Dept: GASTROENTEROLOGY | Facility: HOSPITAL | Age: 76
End: 2021-04-06
Payer: MEDICARE

## 2021-04-06 ENCOUNTER — ANESTHESIA (OUTPATIENT)
Dept: GASTROENTEROLOGY | Facility: HOSPITAL | Age: 76
End: 2021-04-06
Payer: MEDICARE

## 2021-04-06 VITALS
TEMPERATURE: 97 F | DIASTOLIC BLOOD PRESSURE: 65 MMHG | HEIGHT: 71 IN | RESPIRATION RATE: 18 BRPM | OXYGEN SATURATION: 98 % | HEART RATE: 75 BPM | BODY MASS INDEX: 26.32 KG/M2 | SYSTOLIC BLOOD PRESSURE: 136 MMHG | WEIGHT: 188 LBS

## 2021-04-06 DIAGNOSIS — R97.20 ELEVATED PSA: ICD-10-CM

## 2021-04-06 PROCEDURE — 88344 IMHCHEM/IMCYTCHM EA MLT ANTB: CPT | Performed by: PATHOLOGY

## 2021-04-06 PROCEDURE — 55700 PR BIOPSY OF PROSTATE,NEEDLE/PUNCH: CPT | Performed by: UROLOGY

## 2021-04-06 PROCEDURE — NC001 PR NO CHARGE: Performed by: UROLOGY

## 2021-04-06 PROCEDURE — G0416 PROSTATE BIOPSY, ANY MTHD: HCPCS | Performed by: PATHOLOGY

## 2021-04-06 RX ORDER — LIDOCAINE HYDROCHLORIDE 20 MG/ML
INJECTION, SOLUTION EPIDURAL; INFILTRATION; INTRACAUDAL; PERINEURAL AS NEEDED
Status: DISCONTINUED | OUTPATIENT
Start: 2021-04-06 | End: 2021-04-06 | Stop reason: HOSPADM

## 2021-04-06 RX ORDER — CEFAZOLIN SODIUM 2 G/50ML
2000 SOLUTION INTRAVENOUS ONCE
Status: COMPLETED | OUTPATIENT
Start: 2021-04-06 | End: 2021-04-06

## 2021-04-06 RX ORDER — PROPOFOL 10 MG/ML
INJECTION, EMULSION INTRAVENOUS AS NEEDED
Status: DISCONTINUED | OUTPATIENT
Start: 2021-04-06 | End: 2021-04-06

## 2021-04-06 RX ORDER — HYDROCODONE BITARTRATE AND ACETAMINOPHEN 5; 325 MG/1; MG/1
1 TABLET ORAL EVERY 6 HOURS PRN
Status: DISCONTINUED | OUTPATIENT
Start: 2021-04-06 | End: 2021-04-06 | Stop reason: HOSPADM

## 2021-04-06 RX ORDER — SODIUM CHLORIDE 9 MG/ML
125 INJECTION, SOLUTION INTRAVENOUS CONTINUOUS
Status: DISCONTINUED | OUTPATIENT
Start: 2021-04-06 | End: 2021-04-06 | Stop reason: HOSPADM

## 2021-04-06 RX ADMIN — PROPOFOL 50 MG: 10 INJECTION, EMULSION INTRAVENOUS at 10:10

## 2021-04-06 RX ADMIN — PROPOFOL 150 MG: 10 INJECTION, EMULSION INTRAVENOUS at 09:55

## 2021-04-06 RX ADMIN — CEFAZOLIN SODIUM 2000 MG: 2 SOLUTION INTRAVENOUS at 09:26

## 2021-04-06 RX ADMIN — PROPOFOL 50 MG: 10 INJECTION, EMULSION INTRAVENOUS at 10:01

## 2021-04-06 RX ADMIN — PROPOFOL 50 MG: 10 INJECTION, EMULSION INTRAVENOUS at 10:14

## 2021-04-06 RX ADMIN — SODIUM CHLORIDE 125 ML/HR: 0.9 INJECTION, SOLUTION INTRAVENOUS at 09:27

## 2021-04-06 RX ADMIN — PROPOFOL 50 MG: 10 INJECTION, EMULSION INTRAVENOUS at 10:03

## 2021-04-06 RX ADMIN — PROPOFOL 50 MG: 10 INJECTION, EMULSION INTRAVENOUS at 10:04

## 2021-04-06 RX ADMIN — PROPOFOL 50 MG: 10 INJECTION, EMULSION INTRAVENOUS at 09:58

## 2021-04-06 RX ADMIN — PROPOFOL 50 MG: 10 INJECTION, EMULSION INTRAVENOUS at 10:07

## 2021-04-06 NOTE — ANESTHESIA POSTPROCEDURE EVALUATION
Post-Op Assessment Note    CV Status:  Stable  Pain Score: 1    Pain management: adequate     Mental Status:  Alert   PONV Controlled:  Controlled   Airway Patency:  Patent      Post Op Vitals Reviewed: Yes      Staff: Anesthesiologist, CRNA         No complications documented      /53 (04/06/21 1025)    Temp (!) 97 °F (36 1 °C) (04/06/21 1025)    Pulse 80 (04/06/21 1025)   Resp 18 (04/06/21 1025)    SpO2 93 % (04/06/21 1025)

## 2021-04-06 NOTE — OP NOTE
OPERATIVE REPORT  PATIENT NAME: Brenton Gutierres    :  1945  MRN: [id number]  Pt Location: [facility] ROOM 01    SURGERY DATE: 2021    Surgeon(s) and Role:     Lester Ryder MD - Primary    Preop Diagnosis:  Elevated PSA [R97 20]   Abnormal MRI    Post-Op Diagnosis Codes:     * Elevated PSA [R97 20]  Abnormal MRI    Procedure(s) (LRB):  TRANSPERINEAL MRI FUSION PROSTATE BIOPSY (N/A)    Specimen(s):  ID Type Source Tests Collected by Time Destination   1 : Right anterior (target) Tissue Prostate TISSUE EXAM Lindsey Livingston MD 2021 1001    2 : Right anterior lateral Tissue Prostate TISSUE EXAM Lindsey Livingston MD 2021 1001    3 : Left anterior medial Tissue Prostate TISSUE EXAM Lindsey Livingston MD 2021 1001    4 : Left anterior lateral Tissue Prostate TISSUE EXAM Lindsey Livingston MD 2021 1001    5 : Left posterior lateral Tissue Prostate TISSUE EXAM Lindsey Livingston MD 2021 1001    6 : Left posterior medial Tissue Prostate TISSUE EXAM Lindsey Livingston MD 2021 1001    7 : Left base Tissue Prostate TISSUE EXAM Lindsey Livingston MD 2021 1001    8 : RIght posterior lateral Tissue Prostate TISSUE EXAM Lindesy Livingston MD 2021 1001    9 : Right posterior medial Tissue Prostate TISSUE EXAM Lindsey Livingston MD 2021 1001    10 : Right base Tissue Prostate TISSUE EXAM Lindsey Livingston MD 2021 1016        Estimated Blood Loss:   Minimal    Drains:  * No LDAs found *    Anesthesia Type:   IV Sedation with Anesthesia    Operative Indications:  Elevated PSA [R97 20]  Pie rads 5 lesion right anterior apex, peripheral    Operative Findings:  Hypoechoic lesion corresponding with the MRI finding on transrectal ultrasound in the right anterior apex  Multiple biopsies taken of this region of interest     Complications:   None    Procedure and Technique:  The patient is  is here for transperineal prostate biopsy guided by biplanar transrectal ultrasound using the Precision Point Perineologic kit and MR fusion using UroNav    The procedure, as well as the risks of bleeding, infection, and urinary retention have been explained he gives informed consent  He has pie rads 5 lesion 16 mm anterior peripheral zone at the prostate apex near the midline extending into the anterior transitional zone  The patient was brought to the operating room and identified properly  IV sedation was induced, and he was placed in the dorsal lithotomy position  The perineum was shaved, a ChloraPrep scrub was used of the perineum and anal regions, and dried  IO band was used to tape the scrotum in a cranial direction to keep it out of the way the perineum  A time-out was performed  Care was taken when placing the patient in the dorsal lithotomy position to make sure all pressure points were protected     I then prepared the biplanar ultrasound probe with ultrasound gel covering mostly the distal sensor, and covered it with a condom  The condom was secured with 2 rubber bands, and I placed the Precision Point device with a clamp over the midportion of the ultrasound  near the handle over tape wrapped around the probe  The aperture and guide needle were also prepared to be used later, and I placed the transrectal ultrasound probe into the rectum and visualized the prostate in sagittal and transverse views  This was used with a split screen    The perineum was anesthetized with 2% xylocaine 10 cc both superficially and deep with a spinal needle on both sides of the median raphe for total 20 cc I then performed volumetric sweep for the MR fusion and once the transrectal ultrasound view was fused with the MRI and the region of interest, I performed 4 biopsies of the lesion in the right apex as described above  A hypoechoic region could be seen on the ultrasound as well    Once this was done, I performed right anterior lateral biopsies, and the right anterior medial had already been covered with the region of interest   I then turned over to the left with the guide needle in the top aperture, and performed left anterior medial and left anterior lateral biopsies  I then moved the needle down to the second to last aperture, and performed posterior lateral and posterior medial and base biopsies both right and left side    These were all peripheral zone biopsies  Extra biopsies were taken in zones where the initial biopsy was suboptimal tissue  Care was taken to try to include the entire length of the prostate as much as possible for complete coverage  Excellent prostate tissue cores were obtained, and specimens were sent to pathology  I withdrew the guide needle and I held pressure on the perineum for 1 minutes using a folded towel  The perineum was then cleansed with sterile water     I was present for the entire procedure and A qualified resident physician was not available    Patient Disposition:  PACU  and hemodynamically stable    SIGNATURE: Garland Gupta MD  DATE: April 6, 2021  TIME: 10:19 AM

## 2021-04-06 NOTE — ANESTHESIA PREPROCEDURE EVALUATION
Procedure:  TRANSPERINEAL MRI FUSION PROSTATE BIOPSY (N/A Abdomen)    Relevant Problems   CARDIO   (+) Benign essential HTN   (+) Hypercholesterolemia      GI/HEPATIC   (+) Gastroesophageal reflux disease without esophagitis      /RENAL   (+) BPH with elevated PSA      MUSCULOSKELETAL   (+) Neck muscle spasm   (+) Primary osteoarthritis of left knee        CAD/PCI/MI/CHF -- denies  COPD/ASTHMA/GENE -- denies  PROBS WITH PRIOR ANESTHESIA -- denies  NPO STATUS CONFIRMED    Lab Results   Component Value Date    WBC 4 26 (L) 03/29/2021    HGB 15 4 03/29/2021     03/29/2021     Lab Results   Component Value Date    SODIUM 141 03/29/2021    K 4 1 03/29/2021    BUN 15 03/29/2021    CREATININE 0 96 03/29/2021    EGFR 78 03/29/2021     No results found for: PTT   No results found for: INR      No results found for: HGBA1C        Physical Exam    Airway    Mallampati score: II  TM Distance: >3 FB       Dental   implants,     Cardiovascular      Pulmonary      Other Findings        Anesthesia Plan  ASA Score- 2     Anesthesia Type- IV sedation with anesthesia with ASA Monitors  Additional Monitors:   Airway Plan:     Comment: VIRAL Bailey , have personally seen and evaluated the patient prior to anesthetic care  I have reviewed the pre-anesthetic record, and other medical records if appropriate to the anesthetic care  If a CRNA is involved in the case, I have reviewed the CRNA assessment, if present, and agree  Risks/benefits and alternatives discussed with patient including possible PONV, sore throat, and possibility of rare anesthetic and surgical emergencies          Plan Factors-    Chart reviewed  EKG reviewed  Imaging results reviewed  Existing labs reviewed  Patient summary reviewed  Induction-     Postoperative Plan-     Informed Consent- Anesthetic plan and risks discussed with patient  I personally reviewed this patient with the CRNA   Discussed and agreed on the Anesthesia Plan with the CRNA  Maya Russell

## 2021-04-06 NOTE — DISCHARGE INSTRUCTIONS
Call for fever greater than 101 5°, severe pain not relieved by pain medication, or inability to urinate  Expect to see blood in the urine, blood in the stool and blood in the semen   You may see swelling and bruising of the testicles and the space between the legs  Tomorrow you may resume all usual activities  No driving or operating machinery today  Take Tylenol or ibuprofen for pain/discomfort     Drink plenty of fluids

## 2021-04-06 NOTE — TELEPHONE ENCOUNTER
Left detailed voice message to more appt with Dr Sotomayor Friends to 4/21/21 @ 9:30am  Pt needs to confirm or be rescheduled to another date

## 2021-04-06 NOTE — H&P
H&P Exam - Urology   Ivory Ramirez 1945 [id number]      Assessment/Plan     Assessment:  Elevated PSA with pie rads 5 lesion 16 mm anterior peripheral zone at the apex near the midline extending into the anterior transitional zone  87 cc gland  Plan:  MR fusion transperineal prostate biopsy  History of Present Illness   HPI:  The patient presents for MR fusion transperineal prostate biopsy  The risks of bleeding, infection, urinary retention  has been explained and informed consent given  PMH/PSH reviewed  Review of systems:    General: No fever  CVS: No chest pain or new SOB  Abdomen: No diarrhea or blood in stool  : No new voiding difficulties  Neuro: No syncope/weakness/loss of sensation/paresis  Ophthalmologic: No new visual changes  Ortho: No new back/joint pains  Social History- as per previous notes  Family History: non-contributory    Meds/Allergies   PTA meds:   Prior to Admission Medications   Prescriptions Last Dose Informant Patient Reported? Taking?    Ascorbic Acid (VITAMIN C ER) 500 MG CPCR Past Week at Unknown time Self Yes Yes   Sig: Take 1 capsule by mouth daily   Multiple Vitamins-Minerals (MULTIVITAMIN ADULT) TABS Past Week at Unknown time Self Yes Yes   Sig: Take 1 tablet by mouth daily   Omega-3 Fatty Acids (FISH OIL) 1200 MG CAPS Past Week at Unknown time Self Yes Yes   Sig: Take 2 capsules by mouth daily    aspirin (ECOTRIN LOW STRENGTH) 81 mg EC tablet Past Week at Unknown time Self Yes Yes   Sig: Take 81 mg by mouth daily   ezetimibe (ZETIA) 10 mg tablet 4/5/2021 at Unknown time Self No Yes   Sig: Take 1 tablet (10 mg total) by mouth daily   ketoconazole (NIZORAL) 2 % shampoo Past Week at Unknown time Self Yes Yes      Facility-Administered Medications: None         Objective   Vitals: /74   Pulse 75   Temp 97 7 °F (36 5 °C) (Tympanic)   Resp 18   Ht 5' 10 5" (1 791 m)   Wt 85 3 kg (188 lb)   SpO2 95%   BMI 26 59 kg/m²     No intake/output data recorded  Physical Exam:    Awake, alert, in NAD  HEENT: Atraumatic, Normocephalic    Lungs: Normal Respiratory effort, no wheezes,stridor or rales  Abdomen: Nondistended  Extremiites: Normal ROM, no cyanosis/edema  Lab Results: I have personally reviewed pertinent reports  CBC: No results found for: WBC, HGB, HCT, MCV, PLT, ADJUSTEDWBC, MCH, MCHC, RDW, MPV, NRBC  CMP: No results found for: SODIUM, CL, CO2, ANIONGAP, BUN, CREATININE, GLUCOSE, CALCIUM, AST, ALT, ALKPHOS, PROT, BILITOT, EGFR  Urinalysis: No results found for: Naaman Gowers, SPECGRAV, PHUR, LEUKOCYTESUR, NITRITE, PROTEINUA, GLUCOSEU, KETONESU, BILIRUBINUR, BLOODU  Urine Culture: No results found for: URINECX  Imaging: I have personally reviewed pertinent reports

## 2021-04-20 ENCOUNTER — TRANSCRIBE ORDERS (OUTPATIENT)
Dept: LAB | Facility: CLINIC | Age: 76
End: 2021-04-20

## 2021-04-20 ENCOUNTER — APPOINTMENT (OUTPATIENT)
Dept: LAB | Facility: CLINIC | Age: 76
End: 2021-04-20
Payer: MEDICARE

## 2021-04-20 DIAGNOSIS — M17.12 PRIMARY OSTEOARTHRITIS OF LEFT KNEE: ICD-10-CM

## 2021-04-20 DIAGNOSIS — R97.20 ELEVATED PSA: ICD-10-CM

## 2021-04-20 DIAGNOSIS — N40.0 BPH WITH ELEVATED PSA: ICD-10-CM

## 2021-04-20 DIAGNOSIS — I10 BENIGN ESSENTIAL HTN: ICD-10-CM

## 2021-04-20 DIAGNOSIS — K21.9 GASTROESOPHAGEAL REFLUX DISEASE WITHOUT ESOPHAGITIS: ICD-10-CM

## 2021-04-20 DIAGNOSIS — E78.00 HYPERCHOLESTEROLEMIA: ICD-10-CM

## 2021-04-20 DIAGNOSIS — R97.20 BPH WITH ELEVATED PSA: ICD-10-CM

## 2021-04-20 LAB — PSA SERPL-MCNC: 5.4 NG/ML (ref 0–4)

## 2021-04-20 PROCEDURE — 84153 ASSAY OF PSA TOTAL: CPT

## 2021-04-21 ENCOUNTER — TELEMEDICINE (OUTPATIENT)
Dept: UROLOGY | Facility: AMBULATORY SURGERY CENTER | Age: 76
End: 2021-04-21
Payer: MEDICARE

## 2021-04-21 ENCOUNTER — TELEPHONE (OUTPATIENT)
Dept: UROLOGY | Facility: AMBULATORY SURGERY CENTER | Age: 76
End: 2021-04-21

## 2021-04-21 DIAGNOSIS — R97.20 ELEVATED PSA: Primary | ICD-10-CM

## 2021-04-21 PROCEDURE — 99214 OFFICE O/P EST MOD 30 MIN: CPT | Performed by: UROLOGY

## 2021-04-21 NOTE — PROGRESS NOTES
Virtual Regular Visit      Assessment/Plan:    Zaheer 6/7    We discussed his target lesion from the MRI revealing only [place] 6 disease and a small focus of [place] 3 + 4 disease identified at the right apex  25% of this single core  Had Zaheer 7 disease of which only 10% was pattern 4  We discussed options including active surveillance versus surgical extirpation versus external beam radiation therapy  At 77years of age I do not think he is an ideal candidate for surgery and the patient wishes to avoid surgery if possible at this time  We discussed radiation versus active surveillance  The patient is interested in active surveillance  We discussed Prolaris genomic testing so that we can better evaluate his tumor biology and compare his tumor DNA to a large database to determine his 10 year disease specific mortality as well as his 10 year risk of metastatic disease with primary treatment  The patient is inclined to proceed with active surveillance at this time  He understands the risk of disease progression  He will return in 8 weeks once the Prolaris test has been completed for additional discussion  Problem List Items Addressed This Visit     None      Visit Diagnoses     Elevated PSA    -  Primary               Reason for visit is   Chief Complaint   Patient presents with    Virtual Regular Visit        Encounter provider Iris Estevez MD    Provider located at [address] [id number]      Recent Visits  No visits were found meeting these conditions  Showing recent visits within past 7 days and meeting all other requirements     Future Appointments  No visits were found meeting these conditions  Showing future appointments within next 150 days and meeting all other requirements        The patient was identified by name and date of birth   Zoyaemma Perales was informed that this is a telemedicine visit and that the visit is being conducted through [employer] and patient was informed that this is not a secure, HIPAA-compliant platform  He agrees to proceed     My office door was closed  No one else was in the room  He acknowledged consent and understanding of privacy and security of the video platform  The patient has agreed to participate and understands they can discontinue the visit at any time  Patient is aware this is a billable service  Subjective  Dipesh Ambrocio is a 77 y o  male   With a history of a PSA of 5  This was in 2019 and prompted a biopsy which showed some atypical glands but no prostate cancer  More recently his PSA chantal to almost 7  This prompted a multiparametric MRI of the prostate which revealed a PIRADS 5 lesion  This prompted a fusion biopsy which reveals [place] 6 prostate cancer identified in 3 of the 4 cores of the target lesion as well as a single lesion at the right apex of Zaheer 3 + 4 disease involving 25% of the core with 10% pattern 4 disease noted  I had a lengthy discussion with the patient via face time today regarding the significance of [place] 7 prostate cancer and discussing treatment options  He denies any post biopsy sequelae  Bennie LOPEZ     Past Medical History:   Diagnosis Date    Aortic valve disorder     Basal cell carcinoma of nose     removed 07/2015    Hypercholesterolemia     Hyperlipidemia     Mitral valve disorder     Osteoarthritis     Seasonal allergies     resolved 01/21/2015       Past Surgical History:   Procedure Laterality Date    COLONOSCOPY      DENTAL SURGERY      1970's    KNEE SURGERY      OTHER SURGICAL HISTORY      cerumen removal 1st 05/26/2011;   2nd:  08/02/2012    MI BIOPSY OF PROSTATE,NEEDLE/PUNCH N/A 4/6/2021    Procedure: TRANSPERINEAL MRI FUSION PROSTATE BIOPSY;  Surgeon: Dmitry Johnston MD;  [facility];  Service: Urology    PROSTATE BIOPSY         Current Outpatient Medications   Medication Sig Dispense Refill    Ascorbic Acid (VITAMIN C ER) 500 MG CPCR Take 1 capsule by mouth daily      aspirin (ECOTRIN LOW STRENGTH) 81 mg EC tablet Take 81 mg by mouth daily      ezetimibe (ZETIA) 10 mg tablet Take 1 tablet (10 mg total) by mouth daily 90 tablet 1    ketoconazole (NIZORAL) 2 % shampoo       Multiple Vitamins-Minerals (MULTIVITAMIN ADULT) TABS Take 1 tablet by mouth daily      Omega-3 Fatty Acids (FISH OIL) 1200 MG CAPS Take 2 capsules by mouth daily        No current facility-administered medications for this visit  No Known Allergies    Review of Systems    Video Exam    There were no vitals filed for this visit  Physical Exam     I spent Thirty minutes directly with the patient during this visit      VIRTUAL VISIT DISCLAIMER    Maribell Parada acknowledges that he has consented to an online visit or consultation  He understands that the online visit is based solely on information provided by him, and that, in the absence of a face-to-face physical evaluation by the physician, the diagnosis he receives is both limited and provisional in terms of accuracy and completeness  This is not intended to replace a full medical face-to-face evaluation by the physician  Maribell Parada understands and accepts these terms

## 2021-04-21 NOTE — TELEPHONE ENCOUNTER
----- Message from Lizeth Wyman MD sent at 4/21/2021 10:36 AM EDT -----  Please call path and send prostate path for Prolaris Genomic testing    FT

## 2021-04-21 NOTE — TELEPHONE ENCOUNTER
Follow-up disposition: Return in about 2 months (around 6/21/2021) for 8 weeks with OUMAR Rossi      Please advise on an appointment

## 2021-05-12 LAB — SCAN RESULT: NORMAL

## 2021-05-18 ENCOUNTER — OFFICE VISIT (OUTPATIENT)
Dept: INTERNAL MEDICINE CLINIC | Facility: CLINIC | Age: 76
End: 2021-05-18
Payer: MEDICARE

## 2021-05-18 VITALS
HEART RATE: 83 BPM | SYSTOLIC BLOOD PRESSURE: 126 MMHG | OXYGEN SATURATION: 98 % | WEIGHT: 186.4 LBS | DIASTOLIC BLOOD PRESSURE: 64 MMHG | BODY MASS INDEX: 26.69 KG/M2 | HEIGHT: 70 IN | TEMPERATURE: 97.4 F

## 2021-05-18 DIAGNOSIS — E78.00 HYPERCHOLESTEROLEMIA: ICD-10-CM

## 2021-05-18 DIAGNOSIS — H61.23 BILATERAL IMPACTED CERUMEN: ICD-10-CM

## 2021-05-18 DIAGNOSIS — C61 PROSTATE CANCER (HCC): ICD-10-CM

## 2021-05-18 DIAGNOSIS — I10 BENIGN ESSENTIAL HTN: Primary | ICD-10-CM

## 2021-05-18 PROCEDURE — 99214 OFFICE O/P EST MOD 30 MIN: CPT | Performed by: INTERNAL MEDICINE

## 2021-05-18 RX ORDER — EZETIMIBE 10 MG/1
10 TABLET ORAL DAILY
Qty: 90 TABLET | Refills: 1 | Status: SHIPPED | OUTPATIENT
Start: 2021-05-18 | End: 2021-11-19 | Stop reason: SDUPTHER

## 2021-05-19 NOTE — PROGRESS NOTES
Assessment/Plan:    No problem-specific Assessment & Plan notes found for this encounter  Diagnoses and all orders for this visit:    Benign essential HTN  -     CBC and Platelet; Future  -     Comprehensive metabolic panel; Future    Hypercholesterolemia  -     ezetimibe (ZETIA) 10 mg tablet; Take 1 tablet (10 mg total) by mouth daily  -     Lipid panel; Future    Bilateral impacted cerumen    Prostate cancer (HCC)          Subjective:      Patient ID: Brock Atkinson is a 77 y o  male  Patient presents to the office for follow-up visit for hypertension, hypercholesterolemia  He recently underwent MRI guided biopsy of the prostate which revealed a small area of prostate cancer  Prolaris genetic testing was favorable and the patient is being monitored through active surveillance  Lipid profile was reviewed  His cholesterol level is mildly elevated previously  Total cholesterol was 213  HDL cholesterol was 44LDL cholesterol is 147  Triglycerides were normal at 111  CBC was otherwise normal as was his metabolic panel        Family History   Problem Relation Age of Onset    Alcohol abuse Father         independently diagnosed     Heart disease Father     Cancer Father     Hypertension Mother     COPD Mother     Diabetes type II Mother     Hypertension Maternal Grandmother     Coronary artery disease Maternal Grandmother     Diabetes Maternal Grandmother     Lung cancer Maternal Grandmother     Coronary artery disease Family     Stroke Paternal Grandmother     Lung cancer Maternal Grandfather      Social History     Socioeconomic History    Marital status: /Civil Union     Spouse name: Not on file    Number of children: Not on file    Years of education: Not on file    Highest education level: Not on file   Occupational History    Occupation:    Social Needs    Financial resource strain: Not on file    Food insecurity     Worry: Not on file     Inability: Not on file   [facility] Transportation needs     Medical: Not on file     Non-medical: Not on file   Tobacco Use    Smoking status: Never Smoker    Smokeless tobacco: Never Used   Substance and Sexual Activity    Alcohol use:  Yes     Alcohol/week: 1 0 standard drinks     Types: 1 Glasses of wine per week     Frequency: 2-4 times a month     Drinks per session: 1 or 2     Binge frequency: Never     Comment: socially    Drug use: No    Sexual activity: Not on file   Lifestyle    Physical activity     Days per week: Not on file     Minutes per session: Not on file    Stress: Not on file   Relationships    Social connections     Talks on phone: Not on file     Gets together: Not on file     Attends Orthodoxy service: Not on file     Active member of club or organization: Not on file     Attends meetings of clubs or organizations: Not on file     Relationship status: Not on file    Intimate partner violence     Fear of current or ex partner: Not on file     Emotionally abused: Not on file     Physically abused: Not on file     Forced sexual activity: Not on file   Other Topics Concern    Not on file   Social History Narrative    Advance direct of file     Caffeine use -  He admits to consuming caffeine via coffee; 2-3 cups per week; and tea 2 cups per day     Uses safety belts      Past Medical History:   Diagnosis Date    Aortic valve disorder     Basal cell carcinoma of nose     removed 07/2015    Hypercholesterolemia     Hyperlipidemia     Mitral valve disorder     Osteoarthritis     Seasonal allergies     resolved 01/21/2015       Current Outpatient Medications:     Ascorbic Acid (VITAMIN C ER) 500 MG CPCR, Take 1 capsule by mouth daily, Disp: , Rfl:     aspirin (ECOTRIN LOW STRENGTH) 81 mg EC tablet, Take 81 mg by mouth daily, Disp: , Rfl:     ezetimibe (ZETIA) 10 mg tablet, Take 1 tablet (10 mg total) by mouth daily, Disp: 90 tablet, Rfl: 1    ketoconazole (NIZORAL) 2 % shampoo, , Disp: , Rfl:     Multiple Vitamins-Minerals (MULTIVITAMIN ADULT) TABS, Take 1 tablet by mouth daily, Disp: , Rfl:     Omega-3 Fatty Acids (FISH OIL) 1200 MG CAPS, Take 2 capsules by mouth daily , Disp: , Rfl:   No Known Allergies  Past Surgical History:   Procedure Laterality Date    COLONOSCOPY      DENTAL SURGERY      1970's    KNEE SURGERY      OTHER SURGICAL HISTORY      cerumen removal 1st 05/26/2011;   2nd:  08/02/2012    ID BIOPSY OF PROSTATE,NEEDLE/PUNCH N/A 4/6/2021    Procedure: TRANSPERINEAL MRI FUSION PROSTATE BIOPSY;  Surgeon: Lindsey Livingston MD;  Location: [facility];  Service: Urology    PROSTATE BIOPSY           Review of Systems   Constitutional: Negative  HENT: Negative for hearing loss ("Swishing" in ears occasionally)  Eyes: Negative  Respiratory: Negative  Cardiovascular: Negative  Gastrointestinal: Negative  Endocrine: Negative  Genitourinary: Negative  Musculoskeletal: Negative  Skin: Negative  Neurological: Negative  Hematological: Negative  Psychiatric/Behavioral: Negative  Objective:      /64   Pulse 83   Temp (!) 97 4 °F (36 3 °C) (Tympanic)   Ht 5' 9 61" (1 768 m)   Wt 84 6 kg (186 lb 6 4 oz)   SpO2 98%   BMI 27 05 kg/m²  BMI Counseling: Body mass index is 27 05 kg/m²  The BMI is above normal  Nutrition recommendations include decreasing overall calorie intake, 3-5 servings of fruits/vegetables daily, moderation in carbohydrate intake, increasing intake of lean protein, reducing intake of saturated fat and trans fat and reducing intake of cholesterol  Physical Exam  Constitutional:       General: He is not in acute distress  Appearance: Normal appearance  He is normal weight  He is not ill-appearing, toxic-appearing or diaphoretic  HENT:      Head: Normocephalic and atraumatic        Right Ear: Ear canal and external ear normal       Left Ear: Ear canal and external ear normal       Ears:      Comments: Bilateral nonobstructing cerumen impaction manually removed with cerumen spoon  Eyes:      General: No scleral icterus  Conjunctiva/sclera: Conjunctivae normal       Pupils: Pupils are equal, round, and reactive to light  Neck:      Musculoskeletal: Neck supple  Vascular: No carotid bruit  Cardiovascular:      Rate and Rhythm: Normal rate and regular rhythm  Pulses: Normal pulses  Heart sounds: Normal heart sounds  No murmur  Pulmonary:      Effort: Pulmonary effort is normal  No respiratory distress  Breath sounds: Normal breath sounds  Abdominal:      General: Abdomen is flat  There is no distension  Musculoskeletal:         General: No swelling, deformity or signs of injury  Right lower leg: No edema  Left lower leg: No edema  Skin:     General: Skin is warm  Coloration: Skin is not jaundiced  Findings: No bruising, erythema or rash  Neurological:      General: No focal deficit present  Mental Status: He is alert and oriented to person, place, and time  Mental status is at baseline  Psychiatric:         Mood and Affect: Mood normal          Behavior: Behavior normal          Thought Content:  Thought content normal          Judgment: Judgment normal

## 2021-05-19 NOTE — ASSESSMENT & PLAN NOTE
Recently found to have 1 small area of prostate cancer and a core biopsy  Prolaris Genetic testing was favorable    Active surveillance is the mode of treatment at this time

## 2021-05-19 NOTE — ASSESSMENT & PLAN NOTE
Cholesterol levels increased slightly  Patient has not made any changes to his diet and continues with CTA 10 mg daily    We will see how his labs stack up in 6 months

## 2021-05-28 ENCOUNTER — OFFICE VISIT (OUTPATIENT)
Dept: URGENT CARE | Age: 76
End: 2021-05-28
Payer: MEDICARE

## 2021-05-28 VITALS
DIASTOLIC BLOOD PRESSURE: 68 MMHG | TEMPERATURE: 97.1 F | RESPIRATION RATE: 20 BRPM | HEART RATE: 96 BPM | OXYGEN SATURATION: 98 % | SYSTOLIC BLOOD PRESSURE: 150 MMHG

## 2021-05-28 DIAGNOSIS — T14.8XXA INFECTED OPEN WOUND: Primary | ICD-10-CM

## 2021-05-28 DIAGNOSIS — L08.9 INFECTED OPEN WOUND: Primary | ICD-10-CM

## 2021-05-28 PROCEDURE — G0463 HOSPITAL OUTPT CLINIC VISIT: HCPCS | Performed by: NURSE PRACTITIONER

## 2021-05-28 PROCEDURE — 99213 OFFICE O/P EST LOW 20 MIN: CPT | Performed by: NURSE PRACTITIONER

## 2021-05-28 RX ORDER — SULFAMETHOXAZOLE AND TRIMETHOPRIM 800; 160 MG/1; MG/1
1 TABLET ORAL EVERY 12 HOURS SCHEDULED
Qty: 14 TABLET | Refills: 0 | Status: SHIPPED | OUTPATIENT
Start: 2021-05-28 | End: 2021-06-04

## 2021-05-28 NOTE — PATIENT INSTRUCTIONS
Acute Wound Care   AMBULATORY CARE:   An acute wound  is an injury that causes a break in the skin  An acute wound can happen suddenly, last a short time, and may heal on its own  Common signs and symptoms of an acute wound:   · A cut, tear, or gash in your skin    · Bleeding, swelling, pain, or trouble moving the affected area    · Dirt or foreign objects inside the wound     · Milky, yellow, green, or brown pus in the wound     · Red, tender, or warm area around the pus    · Fever  Seek care immediately if:   · You have pus or a foul odor coming from the wound  · You have sudden trouble breathing or chest pain  · Blood soaks through your bandage  Contact your healthcare provider if:   · You have muscle, joint, or body aches, sweating, or a fever  · You have more swelling, redness, or bleeding in your wound  · Your skin is itchy, swollen, or you have a rash  · You have questions or concerns about your condition or care  Treatment for an acute wound  may include any of the following:  · Cleansing  is done with soap and water to wash away germs and decrease the risk of infection  Sterile water further cleans the wound  The cleaning is done under high pressure with a catheter tip and large syringe  A solution that kills germs may also be used  · Debridement  is done to clean and remove objects, dirt, or dead tissues from the open wound  Healthcare providers may also drain the wound to clean out pus  · Closure of the wound  is done with stitches, staples, skin adhesive, or other treatments  This may be done if the wound is wide or deep  Stitches may be needed if the wound is in an area that moves a lot, such as the hands, feet, and joints  Stitches may help to keep the wound from getting infected  They may also decrease the amount of scarring you have  Some wounds may heal better without stitches    Wound care:   · If your wound was closed with thin strips of medical tape, keep them clean and dry  The strips of medical tape will fall off on their own  Do not pull them off  · Keep the bandage clean and dry  Do not remove the bandage over your wound unless your healthcare provider says it is okay  · Wash your hands before and after you take care of your wound to prevent infection  · Clean the wound as directed  If you cannot reach the wound, have someone help you  · If you have packing, make sure all the gauze used to pack the wound is taken out and replaced as directed  Keep track of how many gauze dressings are placed inside the wound  Follow up with your healthcare provider as directed:  Write down your questions so you remember to ask them during your visits  © 2016 [employer] is for End User's use only and may not be sold, redistributed or otherwise used for commercial purposes  All illustrations and images included in CareNotes® are the copyrighted property of A D A M [employer] Inc  or Alex Obrien  The above information is an  only  It is not intended as medical advice for individual conditions or treatments  Talk to your doctor, nurse or pharmacist before following any medical regimen to see if it is safe and effective for you

## 2021-05-28 NOTE — PROGRESS NOTES
[address] [facility]        NAME: Joshua Keita is a 77 y o  male  : 1945    MRN: [id number]  DATE: May 28, 2021  TIME: 7:11 PM    Assessment and Plan   Infected open wound [T14  8XXA, L08 9]  1  Infected open wound  sulfamethoxazole-trimethoprim (BACTRIM DS) 800-160 mg per tablet    mupirocin (BACTROBAN) 2 % ointment         Patient Instructions     Take meds as directed  Follow up with PCP in 3-5 days  Proceed to  ER if symptoms worsen  Chief Complaint     Chief Complaint   Patient presents with    Post-op Problem     pt states had 2 skin lesions frozen off his right lower leg last Thursday, states areas blistered which is unusual, today has redness around areas, concerned for infection         History of Present Illness       HPI   Reports two lesions on the left leg were freeze dried about 1 week ago  This left a wound on the left leg  These wounds were expected to heel just fine  However he has noticed that there are becoming red and slightly painful  Says he had done this in the past and the wounds did not look like they are looking now  Review of Systems   Review of Systems   Constitutional: Negative for fever  Skin: Positive for color change and wound (left leg)  Neurological: Negative for weakness and numbness           Current Medications       Current Outpatient Medications:     Ascorbic Acid (VITAMIN C ER) 500 MG CPCR, Take 1 capsule by mouth daily, Disp: , Rfl:     aspirin (ECOTRIN LOW STRENGTH) 81 mg EC tablet, Take 81 mg by mouth daily, Disp: , Rfl:     ezetimibe (ZETIA) 10 mg tablet, Take 1 tablet (10 mg total) by mouth daily, Disp: 90 tablet, Rfl: 1    ketoconazole (NIZORAL) 2 % shampoo, , Disp: , Rfl:     Multiple Vitamins-Minerals (MULTIVITAMIN ADULT) TABS, Take 1 tablet by mouth daily, Disp: , Rfl:     mupirocin (BACTROBAN) 2 % ointment, Apply topically 3 (three) times a day, Disp: 22 g, Rfl: 0    Omega-3 Fatty Acids (FISH OIL) 1200 MG CAPS, Take 2 capsules by mouth daily , Disp: , Rfl:     sulfamethoxazole-trimethoprim (BACTRIM DS) 800-160 mg per tablet, Take 1 tablet by mouth every 12 (twelve) hours for 7 days, Disp: 14 tablet, Rfl: 0    Current Allergies     Allergies as of 05/28/2021    (No Known Allergies)            The following portions of the patient's history were reviewed and updated as appropriate: allergies, current medications, past family history, past medical history, past social history, past surgical history and problem list      Past Medical History:   Diagnosis Date    Aortic valve disorder     Basal cell carcinoma of nose     removed 07/2015    Hypercholesterolemia     Hyperlipidemia     Mitral valve disorder     Osteoarthritis     Seasonal allergies     resolved 01/21/2015       Past Surgical History:   Procedure Laterality Date    COLONOSCOPY      DENTAL SURGERY      1970's    KNEE SURGERY      OTHER SURGICAL HISTORY      cerumen removal 1st 05/26/2011;   2nd:  08/02/2012    [place] BIOPSY OF PROSTATE,NEEDLE/PUNCH N/A 4/6/2021    Procedure: TRANSPERINEAL MRI FUSION PROSTATE BIOPSY;  Surgeon: Victoria Rodriguez MD;  Location: [facility];  Service: Urology    PROSTATE BIOPSY         Family History   Problem Relation Age of Onset    Alcohol abuse Father         independently diagnosed     Heart disease Father     Cancer Father     Hypertension Mother     COPD Mother     Diabetes type II Mother     Hypertension Maternal Grandmother     Coronary artery disease Maternal Grandmother     Diabetes Maternal Grandmother     Lung cancer Maternal Grandmother     Coronary artery disease Family     Stroke Paternal Grandmother     Lung cancer Maternal Grandfather          Medications have been verified  Objective   /68   Pulse 96   Temp (!) 97 1 °F (36 2 °C)   Resp 20   SpO2 98%   No LMP for male patient  Physical Exam     Physical Exam  Skin:     Comments: There two wounds, measuring 1-1 5 cm in diameter  Non-stageable   Pus on the wound bed, mild  Surrounding skin is erythematous  TTP  Minimal wetness  Located on the medial aspect of the left calf

## 2021-06-22 ENCOUNTER — OFFICE VISIT (OUTPATIENT)
Dept: UROLOGY | Facility: AMBULATORY SURGERY CENTER | Age: 76
End: 2021-06-22
Payer: MEDICARE

## 2021-06-22 VITALS
SYSTOLIC BLOOD PRESSURE: 140 MMHG | BODY MASS INDEX: 27.85 KG/M2 | DIASTOLIC BLOOD PRESSURE: 70 MMHG | HEART RATE: 72 BPM | WEIGHT: 188 LBS | HEIGHT: 69 IN

## 2021-06-22 DIAGNOSIS — C61 PROSTATE CANCER (HCC): Primary | ICD-10-CM

## 2021-06-22 PROCEDURE — 99214 OFFICE O/P EST MOD 30 MIN: CPT | Performed by: UROLOGY

## 2021-06-22 NOTE — PROGRESS NOTES
6/22/2021    Brock Atkinson  1945  [id number]        Assessment   history of Zaheer 6/7 prostate cancer      Discussion    I had a lengthy discussion with the patient in the office today regarding his recent Prolaris testing  We discussed his 2% risk of 10 year disease specific mortality along with a 0 6% chance of metastatic disease at 10 years after primary treatment with surgery or radiation  Based on these risks the patient is content with continuing active surveillance which I feel is reasonable  He certainly understands the risk of disease progression with Whiting 7 disease and the need for additional treatment in the future  At this time he accepts the risks associated with active surveillance with Zaheer 7 disease and will return in 6 months with his next PSA and digital rectal examination  We discussed repeating a multiparametric MRI of the prostate approximately 18 months after his initial MRI  We also discussed possibly repeating a prostate biopsy 1 year from his last biopsy  History of Present Illness  77 y o  male with a history of  And elevated PSA of 5  This prompted a transrectal ultrasound-guided biopsy of the prostate in 2019 which revealed atypical glands without evidence of prostate cancer  More recently his PSA chantal to 7  I therefore performed a multiparametric MRI of the prostate which revealed a PIRADS 5 lesion  He subsequently had an MRI fusion biopsy which revealed Whiting 6 disease identified in 3 of the 4 cores of the target lesion as well as a single lesion at the right apex with Whiting 3+4=7/10 prostate cancer  25% of this core was involved by tumor and 10% was noted to be pattern 4  The patient was seen most recently on a telemedicine visit in April 2021  We discussed radiation versus active surveillance  He has a relatively poor candidate for surgery secondary to age  The patient is most interested in active surveillance and this prompted Prolaris testing    He returns in follow-up today to review the Prolaris testing  AUA Symptom Score      Review of Systems  Review of Systems   Constitutional: Negative  HENT: Negative  Eyes: Negative  Respiratory: Negative  Cardiovascular: Negative  Gastrointestinal: Negative  Endocrine: Negative  Genitourinary:        Per HPI   Musculoskeletal: Negative  Skin: Negative  Allergic/Immunologic: Negative  Neurological: Negative  Hematological: Negative  Psychiatric/Behavioral: Negative            Past Medical History  Past Medical History:   Diagnosis Date    Aortic valve disorder     Basal cell carcinoma of nose     removed 07/2015    Hypercholesterolemia     Hyperlipidemia     Mitral valve disorder     Osteoarthritis     Seasonal allergies     resolved 01/21/2015       Past Social History  Past Surgical History:   Procedure Laterality Date    COLONOSCOPY      DENTAL SURGERY      1970's    KNEE SURGERY      OTHER SURGICAL HISTORY      cerumen removal 1st 05/26/2011;   2nd:  08/02/2012    [place] BIOPSY OF PROSTATE,NEEDLE/PUNCH N/A 4/6/2021    Procedure: TRANSPERINEAL MRI FUSION PROSTATE BIOPSY;  Surgeon: Dmitry Johnston MD;  [facility];  Service: Urology    PROSTATE BIOPSY         Past Family History  Family History   Problem Relation Age of Onset    Alcohol abuse Father         independently diagnosed     Heart disease Father     Cancer Father     Hypertension Mother     COPD Mother     Diabetes type II Mother     Hypertension Maternal Grandmother     Coronary artery disease Maternal Grandmother     Diabetes Maternal Grandmother     Lung cancer Maternal Grandmother     Coronary artery disease Family     Stroke Paternal Grandmother     Lung cancer Maternal Grandfather        Past Social history  Social History     Socioeconomic History    Marital status: /Civil Union     Spouse name: Not on file    Number of children: Not on file    Years of education: Not on file  Highest education level: Not on file   Occupational History    Occupation:    Tobacco Use    Smoking status: Never Smoker    Smokeless tobacco: Never Used   Vaping Use    Vaping Use: Never used   Substance and Sexual Activity    Alcohol use: Yes     Alcohol/week: 1 0 standard drinks     Types: 1 Glasses of wine per week     Comment: socially    Drug use: No    Sexual activity: Not on file   Other Topics Concern    Not on file   Social History Narrative    Advance direct of file     Caffeine use -  He admits to consuming caffeine via coffee; 2-3 cups per week; and tea 2 cups per day     Uses safety belts      Social Determinants of Health     Financial Resource Strain:     Difficulty of Paying Living Expenses:    Food Insecurity:     Worried About Running Out of Food in the Last Year:     Ran Out of Food in the Last Year:    Transportation Needs:     Lack of Transportation (Medical):      Lack of Transportation (Non-Medical):    Physical Activity:     Days of Exercise per Week:     Minutes of Exercise per Session:    Stress:     Feeling of Stress :    Social Connections:     Frequency of Communication with Friends and Family:     Frequency of Social Gatherings with Friends and Family:     Attends Hindu Services:     Active Member of Clubs or Organizations:     Attends Club or Organization Meetings:     Marital Status:    Intimate Partner Violence:     Fear of Current or Ex-Partner:     Emotionally Abused:     Physically Abused:     Sexually Abused:        Current Medications  Current Outpatient Medications   Medication Sig Dispense Refill    Ascorbic Acid (VITAMIN C ER) 500 MG CPCR Take 1 capsule by mouth daily      aspirin (ECOTRIN LOW STRENGTH) 81 mg EC tablet Take 81 mg by mouth daily      ezetimibe (ZETIA) 10 mg tablet Take 1 tablet (10 mg total) by mouth daily 90 tablet 1    ketoconazole (NIZORAL) 2 % shampoo       Multiple Vitamins-Minerals (MULTIVITAMIN ADULT) TABS Take 1 tablet by mouth daily      Omega-3 Fatty Acids (FISH OIL) 1200 MG CAPS Take 2 capsules by mouth daily        No current facility-administered medications for this visit  Allergies  No Known Allergies    Past Medical History, Social History, Family History, medications and allergies were reviewed  Vitals  Vitals:    06/22/21 1149   BP: 140/70   BP Location: Left arm   Patient Position: Sitting   Cuff Size: Adult   Pulse: 72   Weight: 85 3 kg (188 lb)   Height: 5' 9" (1 753 m)       Physical Exam  Physical Exam      On examination he is in no acute distress  Gait normal   Affect normal    Results  Lab Results   Component Value Date    PSA 5 4 (H) 04/20/2021     Lab Results   Component Value Date    CALCIUM 8 8 03/29/2021    K 4 1 03/29/2021    CO2 26 03/29/2021     03/29/2021    BUN 15 03/29/2021    CREATININE 0 96 03/29/2021     Lab Results   Component Value Date    WBC 4 26 (L) 03/29/2021    HGB 15 4 03/29/2021    HCT 46 4 03/29/2021    MCV 96 03/29/2021     03/29/2021         Office Urine Dip  No results found for this or any previous visit (from the past 1 hour(s))  ]      Total visit time was  30 minutes of which over 50% was spent on counseling

## 2021-08-25 ENCOUNTER — TELEPHONE (OUTPATIENT)
Dept: UROLOGY | Facility: AMBULATORY SURGERY CENTER | Age: 76
End: 2021-08-25

## 2021-08-25 NOTE — TELEPHONE ENCOUNTER
Working on recall list:    Return in about 6 months (around 12/22/2021) for 6 mos with FT with PSA , Cancer Surveillance  No OV found in timeframe  Please assist  Thank you!

## 2021-11-19 ENCOUNTER — OFFICE VISIT (OUTPATIENT)
Dept: INTERNAL MEDICINE CLINIC | Facility: CLINIC | Age: 76
End: 2021-11-19
Payer: MEDICARE

## 2021-11-19 VITALS
BODY MASS INDEX: 28.05 KG/M2 | OXYGEN SATURATION: 98 % | HEIGHT: 69 IN | DIASTOLIC BLOOD PRESSURE: 75 MMHG | TEMPERATURE: 98 F | SYSTOLIC BLOOD PRESSURE: 148 MMHG | HEART RATE: 98 BPM | WEIGHT: 189.4 LBS

## 2021-11-19 DIAGNOSIS — K21.9 GASTROESOPHAGEAL REFLUX DISEASE WITHOUT ESOPHAGITIS: ICD-10-CM

## 2021-11-19 DIAGNOSIS — E78.00 HYPERCHOLESTEROLEMIA: ICD-10-CM

## 2021-11-19 DIAGNOSIS — Z00.00 MEDICARE ANNUAL WELLNESS VISIT, SUBSEQUENT: ICD-10-CM

## 2021-11-19 DIAGNOSIS — M17.12 PRIMARY OSTEOARTHRITIS OF LEFT KNEE: ICD-10-CM

## 2021-11-19 DIAGNOSIS — I10 BENIGN ESSENTIAL HTN: Primary | ICD-10-CM

## 2021-11-19 PROBLEM — N40.0 BPH WITH ELEVATED PSA: Status: RESOLVED | Noted: 2018-12-11 | Resolved: 2021-11-19

## 2021-11-19 PROBLEM — R97.20 BPH WITH ELEVATED PSA: Status: RESOLVED | Noted: 2018-12-11 | Resolved: 2021-11-19

## 2021-11-19 PROCEDURE — 1123F ACP DISCUSS/DSCN MKR DOCD: CPT | Performed by: INTERNAL MEDICINE

## 2021-11-19 PROCEDURE — 99214 OFFICE O/P EST MOD 30 MIN: CPT | Performed by: INTERNAL MEDICINE

## 2021-11-19 PROCEDURE — G0439 PPPS, SUBSEQ VISIT: HCPCS | Performed by: INTERNAL MEDICINE

## 2021-11-19 RX ORDER — CHOLECALCIFEROL (VITAMIN D3) 125 MCG
100 CAPSULE ORAL DAILY
COMMUNITY

## 2021-11-19 RX ORDER — EZETIMIBE 10 MG/1
10 TABLET ORAL DAILY
Qty: 90 TABLET | Refills: 1 | Status: SHIPPED | OUTPATIENT
Start: 2021-11-19 | End: 2022-05-27 | Stop reason: SDUPTHER

## 2021-11-29 ENCOUNTER — TELEPHONE (OUTPATIENT)
Dept: UROLOGY | Facility: AMBULATORY SURGERY CENTER | Age: 76
End: 2021-11-29

## 2021-12-23 ENCOUNTER — APPOINTMENT (OUTPATIENT)
Dept: LAB | Facility: CLINIC | Age: 76
End: 2021-12-23
Payer: MEDICARE

## 2021-12-23 DIAGNOSIS — C61 PROSTATE CANCER (HCC): ICD-10-CM

## 2021-12-23 LAB — PSA SERPL-MCNC: 5 NG/ML (ref 0–4)

## 2021-12-23 PROCEDURE — 84153 ASSAY OF PSA TOTAL: CPT

## 2021-12-29 ENCOUNTER — OFFICE VISIT (OUTPATIENT)
Dept: UROLOGY | Facility: AMBULATORY SURGERY CENTER | Age: 76
End: 2021-12-29
Payer: MEDICARE

## 2021-12-29 VITALS
WEIGHT: 189 LBS | BODY MASS INDEX: 27.99 KG/M2 | HEIGHT: 69 IN | DIASTOLIC BLOOD PRESSURE: 78 MMHG | SYSTOLIC BLOOD PRESSURE: 128 MMHG

## 2021-12-29 DIAGNOSIS — C61 PROSTATE CANCER (HCC): Primary | ICD-10-CM

## 2021-12-29 PROCEDURE — 99214 OFFICE O/P EST MOD 30 MIN: CPT | Performed by: UROLOGY

## 2022-04-14 ENCOUNTER — HOSPITAL ENCOUNTER (OUTPATIENT)
Dept: RADIOLOGY | Age: 77
Discharge: HOME | End: 2022-04-14
Payer: MEDICARE

## 2022-04-14 DIAGNOSIS — C61 PROSTATE CANCER (HCC): ICD-10-CM

## 2022-04-14 PROCEDURE — 72197 MRI PELVIS W/O & W/DYE: CPT

## 2022-04-14 PROCEDURE — 76377 3D RENDER W/INTRP POSTPROCES: CPT

## 2022-04-14 PROCEDURE — A9585 GADOBUTROL INJECTION: HCPCS | Performed by: UROLOGY

## 2022-04-14 PROCEDURE — G1004 CDSM NDSC: HCPCS

## 2022-04-14 RX ADMIN — GADOBUTROL 8 ML: 604.72 INJECTION INTRAVENOUS at 11:15

## 2022-04-25 ENCOUNTER — APPOINTMENT (OUTPATIENT)
Dept: LAB | Facility: CLINIC | Age: 77
End: 2022-04-25
Payer: MEDICARE

## 2022-04-25 DIAGNOSIS — C61 PROSTATE CANCER (HCC): ICD-10-CM

## 2022-04-25 LAB — PSA SERPL-MCNC: 5.9 NG/ML (ref 0–4)

## 2022-04-25 PROCEDURE — 84153 ASSAY OF PSA TOTAL: CPT

## 2022-04-26 ENCOUNTER — OFFICE VISIT (OUTPATIENT)
Dept: UROLOGY | Facility: AMBULATORY SURGERY CENTER | Age: 77
End: 2022-04-26
Payer: MEDICARE

## 2022-04-26 VITALS
DIASTOLIC BLOOD PRESSURE: 76 MMHG | HEART RATE: 74 BPM | SYSTOLIC BLOOD PRESSURE: 110 MMHG | BODY MASS INDEX: 27.85 KG/M2 | WEIGHT: 188 LBS | OXYGEN SATURATION: 96 % | HEIGHT: 69 IN

## 2022-04-26 DIAGNOSIS — C61 PROSTATE CANCER (HCC): Primary | ICD-10-CM

## 2022-04-26 PROCEDURE — 99214 OFFICE O/P EST MOD 30 MIN: CPT | Performed by: UROLOGY

## 2022-04-26 NOTE — PROGRESS NOTES
4/26/2022    Tennille Corrales  1945  [id number]        Assessment  Zaheer 6/7 prostate cancer on active surveillance      Discussion  We discussed his stable PSA as well as his repeat MRI which is relatively unchanged from prior  I do recommend a confirmatory biopsy now that he is approximately 1 year out from his initial biopsy showing Holderness 3 + 3 and small volume 3+4 disease  We discussed a standard transrectal biopsy in the office versus a repeat MRI fusion biopsy  I am recommending a fusion biopsy and this is how the patient wishes to proceed  After the biopsy is performed he will return in follow-up for additional discussion  The patient is amenable with this plan  History of Present Illness  77 y o  male with a history of an elevated PSA of 5 in 2019  This prompted transrectal ultrasound-guided biopsy the prostate which revealed some atypical glands  His PSA then chantal to 7  This prompted a multiparametric MRI of the prostate revealing a PI-RADS 5 lesion  More recently he underwent an MRI fusion biopsy which revealed Zaheer 6 prostate cancer identified in 3 of the 4 cores in the target lesion  One additional core at the right apex revealed Zaheer 3+4=7/10 prostate cancer  25% of the core was involved with tumor and 10% of the core was noted to be pattern 4  The patient has requested active surveillance although we have discussed external beam radiation therapy  Also discussed surgery, however, at 77years of age this would be pushing the envelope for robot assisted laparoscopic prostatectomy  He did have Prolaris genomic testing which revealed a 10 year disease specific mortality of just 2% and a 10 year risk of metastatic disease with primary treatment of 0 6%  These numbers are favorable and allow for active surveillance with caution  In 2021 he had a multiparametric MRI of the prostate which revealed a category 5 PIRADS lesion with an 87 g prostate    This was the MRI utilized for his fusion biopsy which revealed a small volume 7 disease  His PSA has been relatively stable and most recently is noted to be 5 9  In 2021 his PSA was 5 4  AUA Symptom Score  AUA SYMPTOM SCORE      Most Recent Value   AUA SYMPTOM SCORE    How often have you had a sensation of not emptying your bladder completely after you finished urinating? 1 (P)     How often have you had to urinate again less than two hours after you finished urinating? 2 (P)     How often have you found you stopped and started again several times when you urinate? 1 (P)     How often have you found it difficult to postpone urination? 2 (P)     How often have you had a weak urinary stream? 4 (P)     How often have you had to push or strain to begin urination? 1 (P)     How many times did you most typically get up to urinate from the time you went to bed at night until the time you got up in the morning? 5 (P)     Quality of Life: If you were to spend the rest of your life with your urinary condition just the way it is now, how would you feel about that? 2 (P)     AUA SYMPTOM SCORE 16 (P)           Review of Systems  Review of Systems   Constitutional: Negative  HENT: Negative  Eyes: Negative  Respiratory: Negative  Cardiovascular: Negative  Gastrointestinal: Negative  Endocrine: Negative  Genitourinary:        Per HPI   Musculoskeletal: Negative  Skin: Negative  Allergic/Immunologic: Negative  Neurological: Negative  Hematological: Negative  Psychiatric/Behavioral: Negative            Past Medical History  Past Medical History:   Diagnosis Date    Aortic valve disorder     Basal cell carcinoma of nose     removed 07/2015    BPH with elevated PSA 12/11/2018    Hypercholesterolemia     Hyperlipidemia     Mitral valve disorder     Osteoarthritis     Seasonal allergies     resolved 01/21/2015       Past Social History  Past Surgical History:   Procedure Laterality Date    COLONOSCOPY      DENTAL SURGERY      1970's    KNEE SURGERY      OTHER SURGICAL HISTORY      cerumen removal 1st 05/26/2011;   2nd:  08/02/2012    [place] BIOPSY OF PROSTATE,NEEDLE/PUNCH N/A 4/6/2021    Procedure: TRANSPERINEAL MRI FUSION PROSTATE BIOPSY;  Surgeon: Neli Wild MD;  Location:  [facility];  Service: Urology    PROSTATE BIOPSY         Past Family History  Family History   Problem Relation Age of Onset    Alcohol abuse Father         independently diagnosed     Heart disease Father     Cancer Father     Hypertension Mother     COPD Mother     Diabetes type II Mother     Hypertension Maternal Grandmother     Coronary artery disease Maternal Grandmother     Diabetes Maternal Grandmother     Lung cancer Maternal Grandmother     Coronary artery disease Family     Stroke Paternal Grandmother     Lung cancer Maternal Grandfather        Past Social history  Social History     Socioeconomic History    Marital status: /Civil Union     Spouse name: Not on file    Number of children: Not on file    Years of education: Not on file    Highest education level: Not on file   Occupational History    Occupation:    Tobacco Use    Smoking status: Never Smoker    Smokeless tobacco: Never Used   Vaping Use    Vaping Use: Never used   Substance and Sexual Activity    Alcohol use:  Yes     Alcohol/week: 1 0 standard drink     Types: 1 Glasses of wine per week     Comment: socially    Drug use: No    Sexual activity: Not on file   Other Topics Concern    Not on file   Social History Narrative    Advance direct of file     Caffeine use -  He admits to consuming caffeine via coffee; 2-3 cups per week; and tea 2 cups per day     Uses safety belts      Social Determinants of Health     Financial Resource Strain: Not on file   Food Insecurity: Not on file   Transportation Needs: Not on file   Physical Activity: Not on file   Stress: Not on file   Social Connections: Not on file   Intimate Partner Violence: Not on file   Housing Stability: Not on file       Current Medications  Current Outpatient Medications   Medication Sig Dispense Refill    Ascorbic Acid (VITAMIN C ER) 500 MG CPCR Take 1 capsule by mouth daily      aspirin (ECOTRIN LOW STRENGTH) 81 mg EC tablet Take 81 mg by mouth daily      co-enzyme Q-10 100 mg capsule Take 100 mg by mouth daily      ezetimibe (ZETIA) 10 mg tablet Take 1 tablet (10 mg total) by mouth daily 90 tablet 1    ketoconazole (NIZORAL) 2 % shampoo       Multiple Vitamins-Minerals (MULTIVITAMIN ADULT) TABS Take 1 tablet by mouth daily      Omega-3 Fatty Acids (FISH OIL) 1200 MG CAPS Take 2 capsules by mouth daily        No current facility-administered medications for this visit  Allergies  No Known Allergies    Past Medical History, Social History, Family History, medications and allergies were reviewed  Vitals  Vitals:    04/26/22 1328   BP: 110/76   Pulse: 74   SpO2: 96%   Weight: 85 3 kg (188 lb)   Height: 5' 9 29" (1 76 m)       Physical Exam  Physical Exam  On examination he is in no acute distress  Gait normal   Affect normal      Results  Lab Results   Component Value Date    PSA 5 9 (H) 04/25/2022    PSA 5 0 (H) 12/23/2021    PSA 5 4 (H) 04/20/2021     Lab Results   Component Value Date    CALCIUM 8 8 03/29/2021    K 4 1 03/29/2021    CO2 26 03/29/2021     03/29/2021    BUN 15 03/29/2021    CREATININE 0 96 03/29/2021     Lab Results   Component Value Date    WBC 4 26 (L) 03/29/2021    HGB 15 4 03/29/2021    HCT 46 4 03/29/2021    MCV 96 03/29/2021     03/29/2021         Office Urine Dip  No results found for this or any previous visit (from the past 1 hour(s))  ]      Total visit time was 30 minutes of which over 50% was spent on counseling

## 2022-04-28 ENCOUNTER — TELEPHONE (OUTPATIENT)
Dept: UROLOGY | Facility: MEDICAL CENTER | Age: 77
End: 2022-04-28

## 2022-04-28 NOTE — TELEPHONE ENCOUNTER
Pi Rads 5 MRI Result to be scheduled within 4-6 weeks  My current 1st available is 7/5/2022  I left a voice mail message for the patient to let him know I am working on a sooner date for him and will call by the end of next week to schedule

## 2022-05-05 NOTE — TELEPHONE ENCOUNTER
I left a voice mail message to schedule   Holding 6/8/2022 at [address] [address] with Dr Breezy Weber

## 2022-05-06 NOTE — TELEPHONE ENCOUNTER
Clinical, patient will need a pathology review with Dr America Wilson  None available, please contact patient to arrange

## 2022-05-06 NOTE — TELEPHONE ENCOUNTER
I called the patient, and advised on VM that the 1st available with Dr Jorge Garvey is 7/5/2022       Scheduled 6/8/2022 at [facility] with Dr Lori Falcon     -instructions mailed  -patient aware to be NPO, needs a  and use an enema 1 hour prior to leaving the house morning of procedure  -patient aware to stop his Aspirin  7 days prior  -CBC, CMP, Urine C&S and EKG 2 weeks prior  -[employer] - no auth required

## 2022-05-06 NOTE — TELEPHONE ENCOUNTER
Pt calling in response to message from Surgery Coordinator he's inquiring if Dr Ortega would be available?  If not he's fine with date & MD

## 2022-05-09 NOTE — TELEPHONE ENCOUNTER
Called and spoke with patient  Offered sooner appointment at [facility] for bx results but patient preferred to stay only in [place]  Offered the last week in June   Patient scheduled 6/30 at 11:30 with Dr Shell Heart in [place] per patient request

## 2022-05-11 ENCOUNTER — ANESTHESIA EVENT (OUTPATIENT)
Dept: PERIOP | Facility: AMBULARY SURGERY CENTER | Age: 77
End: 2022-05-11
Payer: MEDICARE

## 2022-05-23 ENCOUNTER — RA CDI HCC (OUTPATIENT)
Dept: OTHER | Facility: HOSPITAL | Age: 77
End: 2022-05-23

## 2022-05-23 NOTE — PROGRESS NOTES
[address]  coding opportunities       Chart reviewed, no opportunity found: CHART REVIEWED, NO OPPORTUNITY FOUND        Patients Insurance     Medicare Insurance: Medicare

## 2022-05-27 ENCOUNTER — TELEPHONE (OUTPATIENT)
Dept: OTHER | Facility: OTHER | Age: 77
End: 2022-05-27

## 2022-05-27 ENCOUNTER — APPOINTMENT (OUTPATIENT)
Dept: LAB | Age: 77
End: 2022-05-27
Payer: MEDICARE

## 2022-05-27 ENCOUNTER — OFFICE VISIT (OUTPATIENT)
Dept: INTERNAL MEDICINE CLINIC | Facility: CLINIC | Age: 77
End: 2022-05-27
Payer: MEDICARE

## 2022-05-27 ENCOUNTER — OFFICE VISIT (OUTPATIENT)
Dept: LAB | Age: 77
End: 2022-05-27
Payer: MEDICARE

## 2022-05-27 VITALS
HEART RATE: 79 BPM | DIASTOLIC BLOOD PRESSURE: 60 MMHG | TEMPERATURE: 97.7 F | WEIGHT: 188 LBS | SYSTOLIC BLOOD PRESSURE: 130 MMHG | BODY MASS INDEX: 26.32 KG/M2 | HEIGHT: 71 IN | OXYGEN SATURATION: 99 %

## 2022-05-27 DIAGNOSIS — C61 MALIGNANT NEOPLASM OF PROSTATE (HCC): ICD-10-CM

## 2022-05-27 DIAGNOSIS — R97.20 ELEVATED PROSTATE SPECIFIC ANTIGEN (PSA): ICD-10-CM

## 2022-05-27 DIAGNOSIS — Z01.812 PRE-OPERATIVE LABORATORY EXAMINATION: ICD-10-CM

## 2022-05-27 DIAGNOSIS — R39.89 SUSPECTED UTI: ICD-10-CM

## 2022-05-27 DIAGNOSIS — E78.00 HYPERCHOLESTEROLEMIA: ICD-10-CM

## 2022-05-27 DIAGNOSIS — Z01.810 PRE-OPERATIVE CARDIOVASCULAR EXAMINATION: ICD-10-CM

## 2022-05-27 DIAGNOSIS — I10 BENIGN ESSENTIAL HTN: Primary | ICD-10-CM

## 2022-05-27 DIAGNOSIS — C61 PROSTATE CANCER (HCC): ICD-10-CM

## 2022-05-27 PROBLEM — M62.838 NECK MUSCLE SPASM: Status: RESOLVED | Noted: 2020-09-22 | Resolved: 2022-05-27

## 2022-05-27 PROBLEM — R53.81 MALAISE AND FATIGUE: Status: RESOLVED | Noted: 2019-10-03 | Resolved: 2022-05-27

## 2022-05-27 PROBLEM — R05.9 COUGH: Status: RESOLVED | Noted: 2019-12-05 | Resolved: 2022-05-27

## 2022-05-27 PROBLEM — R53.83 MALAISE AND FATIGUE: Status: RESOLVED | Noted: 2019-10-03 | Resolved: 2022-05-27

## 2022-05-27 PROCEDURE — 93005 ELECTROCARDIOGRAM TRACING: CPT

## 2022-05-27 PROCEDURE — 87086 URINE CULTURE/COLONY COUNT: CPT

## 2022-05-27 PROCEDURE — 99214 OFFICE O/P EST MOD 30 MIN: CPT | Performed by: INTERNAL MEDICINE

## 2022-05-27 RX ORDER — EZETIMIBE 10 MG/1
10 TABLET ORAL DAILY
Qty: 90 TABLET | Refills: 1 | Status: SHIPPED | OUTPATIENT
Start: 2022-05-27 | End: 2022-11-23

## 2022-05-27 NOTE — TELEPHONE ENCOUNTER
Patient had CBC, CMP and Lipid panel done 5/17 - he is ordered the same labs for his MRI fusion biopsy on 6/8 - does he need repeat testing

## 2022-05-27 NOTE — TELEPHONE ENCOUNTER
Patient had another blood test a few weeks ago for his pcp, please advise if that blood work could be used for his pre-op testing   He is scheduled for a biopsy on 6/8

## 2022-05-27 NOTE — PROGRESS NOTES
Assessment/Plan:    Benign essential HTN  Blood pressure is nicely controlled without any medications at this time    Hypercholesterolemia  Will continue Zetia 10 mg daily    Prostate cancer ([facility] )  Has repeat biopsy scheduled in approximately 2 weeks       Diagnoses and all orders for this visit:    Benign essential HTN  -     CBC and differential; Future  -     Comprehensive metabolic panel; Future    Hypercholesterolemia  -     ezetimibe (ZETIA) 10 mg tablet; Take 1 tablet (10 mg total) by mouth daily  -     CBC and differential; Future  -     Comprehensive metabolic panel; Future  -     Lipid panel; Future    Prostate cancer ([facility] )  -     CBC and differential; Future  -     Comprehensive metabolic panel; Future          Subjective:      Patient ID: Scot Johnson is a 77 y o  male  Presents for follow-up visit and has no major complaints at this time  He is following with Urology over his diagnosis with prostate cancer  Recent MRI did not disclose any significant changes by he is scheduled for a repeat biopsy to determine the course of treatment whether continued watchful waiting or if radiation therapy locally may be an option  Otherwise he has no major issues  CD a seems to be effective in keeping his lipid profile in except double range  He has no issues with his blood pressure at present he is not taking any medication  Recent labs reviewed and are acceptable  Metabolic panel significant only for fasting glucose of 102  CBC was normal   Total cholesterol 206  HDL cholesterol 41 LDL cholesterol 142 and triglycerides were normal at 1:16 a m           Family History   Problem Relation Age of Onset    Alcohol abuse Father         independently diagnosed     Heart disease Father     Cancer Father     Hypertension Mother     COPD Mother     Diabetes type II Mother     Hypertension Maternal Grandmother     Coronary artery disease Maternal Grandmother     Diabetes Maternal Grandmother     Lung cancer Maternal Grandmother     Coronary artery disease Family     Stroke Paternal Grandmother     Lung cancer Maternal Grandfather      Social History     Socioeconomic History    Marital status: /Civil Union     Spouse name: Not on file    Number of children: Not on file    Years of education: Not on file    Highest education level: Not on file   Occupational History    Occupation:    Tobacco Use    Smoking status: Never Smoker    Smokeless tobacco: Never Used   Vaping Use    Vaping Use: Never used   Substance and Sexual Activity    Alcohol use:  Yes     Alcohol/week: 1 0 standard drink     Types: 1 Glasses of wine per week     Comment: socially    Drug use: No    Sexual activity: Not on file   Other Topics Concern    Not on file   Social History Narrative    Advance direct of file     Caffeine use -  He admits to consuming caffeine via coffee; 2-3 cups per week; and tea 2 cups per day     Uses safety belts      Social Determinants of Health     Financial Resource Strain: Not on file   Food Insecurity: Not on file   Transportation Needs: Not on file   Physical Activity: Not on file   Stress: Not on file   Social Connections: Not on file   Intimate Partner Violence: Not on file   Housing Stability: Not on file     Past Medical History:   Diagnosis Date    Aortic valve disorder     Basal cell carcinoma of nose     removed 07/2015    BPH with elevated PSA 12/11/2018    Hypercholesterolemia     Hyperlipidemia     Mitral valve disorder     Osteoarthritis     Seasonal allergies     resolved 01/21/2015       Current Outpatient Medications:     Ascorbic Acid (VITAMIN C ER) 500 MG CPCR, Take 1 capsule by mouth daily, Disp: , Rfl:     aspirin (ECOTRIN LOW STRENGTH) 81 mg EC tablet, Take 81 mg by mouth daily, Disp: , Rfl:     co-enzyme Q-10 100 mg capsule, Take 100 mg by mouth daily, Disp: , Rfl:     ezetimibe (ZETIA) 10 mg tablet, Take 1 tablet (10 mg total) by mouth daily, Disp: 90 tablet, Rfl: 1    ketoconazole (NIZORAL) 2 % shampoo, , Disp: , Rfl:     Multiple Vitamins-Minerals (MULTIVITAMIN ADULT) TABS, Take 1 tablet by mouth daily, Disp: , Rfl:     Omega-3 Fatty Acids (FISH OIL) 1200 MG CAPS, Take 2 capsules by mouth daily , Disp: , Rfl:   No Known Allergies  Past Surgical History:   Procedure Laterality Date    COLONOSCOPY      DENTAL SURGERY      1970's    KNEE SURGERY      OTHER SURGICAL HISTORY      cerumen removal 1st 05/26/2011;   2nd:  08/02/2012    [place] BIOPSY OF PROSTATE,NEEDLE/PUNCH N/A 4/6/2021    Procedure: TRANSPERINEAL MRI FUSION PROSTATE BIOPSY;  Surgeon: Laura Deras MD;  Location: [facility];  Service: Urology    PROSTATE BIOPSY           Review of Systems   Constitutional: Negative  HENT: Positive for ear discharge (Intermittent ear discomfort from presumed cerumen)  Eyes: Negative  Respiratory: Negative  Cardiovascular: Negative  Gastrointestinal: Negative  Endocrine: Negative  Genitourinary: Negative  Musculoskeletal: Negative  Skin: Negative  Allergic/Immunologic: Negative  Neurological: Negative  Hematological: Negative  Psychiatric/Behavioral: Negative  Objective:      /60 (BP Location: Left arm, Patient Position: Sitting, Cuff Size: Standard)   Pulse 79   Temp 97 7 °F (36 5 °C) (Temporal)   Ht 5' 10 55" (1 792 m)   Wt 85 3 kg (188 lb)   SpO2 99%   BMI 26 56 kg/m²          Physical Exam  Vitals reviewed  Constitutional:       General: He is not in acute distress  Appearance: Normal appearance  He is normal weight  He is not ill-appearing, toxic-appearing or diaphoretic  HENT:      Head: Normocephalic and atraumatic  Right Ear: External ear normal       Left Ear: External ear normal       Nose: Nose normal    Eyes:      General: No scleral icterus  Conjunctiva/sclera: Conjunctivae normal       Pupils: Pupils are equal, round, and reactive to light  Neck:      Vascular: No carotid bruit or JVD  Trachea: No tracheal deviation  Cardiovascular:      Rate and Rhythm: Normal rate and regular rhythm  Pulses: Normal pulses  Heart sounds: Normal heart sounds  No murmur heard  Pulmonary:      Effort: Pulmonary effort is normal  No respiratory distress  Breath sounds: Normal breath sounds  No rales  Abdominal:      General: Abdomen is flat  There is no distension  Musculoskeletal:         General: Deformity (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) present  No swelling  Cervical back: Neck supple  Right lower leg: No edema  Left lower leg: No edema  Skin:     General: Skin is warm  Coloration: Skin is not jaundiced  Findings: No bruising, erythema or rash  Neurological:      General: No focal deficit present  Mental Status: He is alert and oriented to person, place, and time  Mental status is at baseline     Psychiatric:         Mood and Affect: Mood normal          Behavior: Behavior normal

## 2022-05-28 LAB — BACTERIA UR CULT: NORMAL

## 2022-05-31 NOTE — TELEPHONE ENCOUNTER
Spoke with patient  Advised that per [facility], the testing that was done by his PCP is sufficient for the MRI fusion bx and that he just needs to get the EKG completed  He understands

## 2022-06-03 LAB
ATRIAL RATE: 70 BPM
P AXIS: 51 DEGREES
PR INTERVAL: 156 MS
QRS AXIS: -16 DEGREES
QRSD INTERVAL: 78 MS
QT INTERVAL: 396 MS
QTC INTERVAL: 427 MS
T WAVE AXIS: 28 DEGREES
VENTRICULAR RATE: 70 BPM

## 2022-06-03 PROCEDURE — 93010 ELECTROCARDIOGRAM REPORT: CPT | Performed by: INTERNAL MEDICINE

## 2022-06-06 NOTE — PRE-PROCEDURE INSTRUCTIONS
Pre-Surgery Instructions:   Medication Instructions    Ascorbic Acid (VITAMIN C ER) 500 MG CPCR Instructions provided by MD Last day taken 6/6/22    aspirin (ECOTRIN LOW STRENGTH) 81 mg EC tablet Instructions provided by MD   Last day taken 6/6/22    co-enzyme Q-10 100 mg capsule Instructions provided by MD   Last day taken 6/6/22    ezetimibe (ZETIA) 10 mg tablet Hold day of surgery   ketoconazole (NIZORAL) 2 % shampoo Hold day of surgery   Multiple Vitamins-Minerals (MULTIVITAMIN ADULT) TABS Instructions provided by MD   Last day taken 6/6/22    Omega-3 Fatty Acids (FISH OIL) 1200 MG CAPS Instructions provided by MD   Last day taken 6/6/22        NPO after midnight, meds in am with sips of water  Understands when to expect preop phone call  Remove all jewelry  Advised of visitation policy  Before your operation, you play an important role in decreasing your risk for infection by washing with special antiseptic soap  This is an effective way to reduce bacteria on the skin which may help to prevent infections at the surgical site  Please read the following directions in advance  · Use antibacterial soap  2      The day before your operation follow these directions carefully to get ready  · Place clean lines (sheets) on your bed; you should sleep on clean sheets after your evening shower  · Get clean towels and washcloths ready - you need enough for 2 showers  · Set aside clean underwear, pajamas, and clothing to wear after the shower  Reminders:  · DO NOT use any other soap or body rinse on your skin during or after the antiseptic showers  · DO NOT use lotion , powder, deodorant, or perfume/aftershave of any kind on your skin after your antiseptic shower  · DO NOT shave any body parts in the 24 hours/the day before your operation  · DO NOT get the antiseptic soap in your eyes, ears, nose, mouth, or vaginal area      3      You will need to shower the night before AND the morning of your Surgery  Shower 1:  · The evening before your operation, take the fist shower  · First, shampoo your hair with regular shampoo and rinse it completely before you use the anitseptic soap  After washing and rinsing your hair, rinse your body  · Next, use a clean wash cloth to apply the antiseptic soap and wash your body from the neck down to your toes using 1/2 bottle of the antiseptic soap  You will use the other 1/2 bottle for the second shower  · Clean the area where your incision will be; later this area well for about 2 minutes  · If you ar having head or neck surgery, wash areas with the antiseptic soap  · Rinse yourself completely with running water  · Use a clean towel to dry off  · Wear clean underwear and clothing/pajamas  Shower 2:  · The Morning of your operation, take the second shower following the same steps as Shower 1 using the second 1/2 of the bottle of antiseptic soap  · Use clean cloths and towels to was and dry yourself off  · Wear clean underwear and clothing

## 2022-06-08 ENCOUNTER — HOSPITAL ENCOUNTER (OUTPATIENT)
Facility: AMBULARY SURGERY CENTER | Age: 77
Setting detail: OUTPATIENT SURGERY
Discharge: HOME | End: 2022-06-08
Attending: UROLOGY | Admitting: UROLOGY
Payer: MEDICARE

## 2022-06-08 ENCOUNTER — ANESTHESIA (OUTPATIENT)
Dept: PERIOP | Facility: AMBULARY SURGERY CENTER | Age: 77
End: 2022-06-08
Payer: MEDICARE

## 2022-06-08 VITALS
SYSTOLIC BLOOD PRESSURE: 156 MMHG | OXYGEN SATURATION: 98 % | HEIGHT: 70 IN | RESPIRATION RATE: 18 BRPM | DIASTOLIC BLOOD PRESSURE: 76 MMHG | TEMPERATURE: 97.4 F | WEIGHT: 185 LBS | HEART RATE: 90 BPM | BODY MASS INDEX: 26.48 KG/M2

## 2022-06-08 DIAGNOSIS — R97.20 ELEVATED PROSTATE SPECIFIC ANTIGEN (PSA): ICD-10-CM

## 2022-06-08 DIAGNOSIS — C61 MALIGNANT NEOPLASM OF PROSTATE (HCC): ICD-10-CM

## 2022-06-08 PROCEDURE — G0416 PROSTATE BIOPSY, ANY MTHD: HCPCS | Performed by: PATHOLOGY

## 2022-06-08 PROCEDURE — 55706 BX PRST8 NDL SAT SAMPLING: CPT | Performed by: UROLOGY

## 2022-06-08 PROCEDURE — 51798 US URINE CAPACITY MEASURE: CPT | Performed by: UROLOGY

## 2022-06-08 PROCEDURE — NC001 PR NO CHARGE: Performed by: UROLOGY

## 2022-06-08 RX ORDER — FENTANYL CITRATE/PF 50 MCG/ML
25 SYRINGE (ML) INJECTION
Status: DISCONTINUED | OUTPATIENT
Start: 2022-06-08 | End: 2022-06-08 | Stop reason: HOSPADM

## 2022-06-08 RX ORDER — FENTANYL CITRATE 50 UG/ML
INJECTION, SOLUTION INTRAMUSCULAR; INTRAVENOUS AS NEEDED
Status: DISCONTINUED | OUTPATIENT
Start: 2022-06-08 | End: 2022-06-08

## 2022-06-08 RX ORDER — PROPOFOL 10 MG/ML
INJECTION, EMULSION INTRAVENOUS CONTINUOUS PRN
Status: DISCONTINUED | OUTPATIENT
Start: 2022-06-08 | End: 2022-06-08

## 2022-06-08 RX ORDER — ONDANSETRON 2 MG/ML
INJECTION INTRAMUSCULAR; INTRAVENOUS AS NEEDED
Status: DISCONTINUED | OUTPATIENT
Start: 2022-06-08 | End: 2022-06-08

## 2022-06-08 RX ORDER — ACETAMINOPHEN 325 MG/1
650 TABLET ORAL EVERY 6 HOURS PRN
Status: DISCONTINUED | OUTPATIENT
Start: 2022-06-08 | End: 2022-06-08 | Stop reason: HOSPADM

## 2022-06-08 RX ORDER — ONDANSETRON 2 MG/ML
4 INJECTION INTRAMUSCULAR; INTRAVENOUS ONCE AS NEEDED
Status: DISCONTINUED | OUTPATIENT
Start: 2022-06-08 | End: 2022-06-08 | Stop reason: HOSPADM

## 2022-06-08 RX ORDER — CEFAZOLIN SODIUM 2 G/50ML
2000 SOLUTION INTRAVENOUS ONCE
Status: COMPLETED | OUTPATIENT
Start: 2022-06-08 | End: 2022-06-08

## 2022-06-08 RX ORDER — MIDAZOLAM HYDROCHLORIDE 2 MG/2ML
INJECTION, SOLUTION INTRAMUSCULAR; INTRAVENOUS AS NEEDED
Status: DISCONTINUED | OUTPATIENT
Start: 2022-06-08 | End: 2022-06-08

## 2022-06-08 RX ORDER — SODIUM CHLORIDE, SODIUM LACTATE, POTASSIUM CHLORIDE, CALCIUM CHLORIDE 600; 310; 30; 20 MG/100ML; MG/100ML; MG/100ML; MG/100ML
INJECTION, SOLUTION INTRAVENOUS CONTINUOUS PRN
Status: DISCONTINUED | OUTPATIENT
Start: 2022-06-08 | End: 2022-06-08

## 2022-06-08 RX ORDER — LIDOCAINE HYDROCHLORIDE 10 MG/ML
INJECTION, SOLUTION EPIDURAL; INFILTRATION; INTRACAUDAL; PERINEURAL AS NEEDED
Status: DISCONTINUED | OUTPATIENT
Start: 2022-06-08 | End: 2022-06-08 | Stop reason: HOSPADM

## 2022-06-08 RX ADMIN — FENTANYL CITRATE 25 MCG: 50 INJECTION INTRAMUSCULAR; INTRAVENOUS at 13:55

## 2022-06-08 RX ADMIN — FENTANYL CITRATE 25 MCG: 50 INJECTION INTRAMUSCULAR; INTRAVENOUS at 13:28

## 2022-06-08 RX ADMIN — FENTANYL CITRATE 25 MCG: 50 INJECTION INTRAMUSCULAR; INTRAVENOUS at 13:34

## 2022-06-08 RX ADMIN — ONDANSETRON 4 MG: 2 INJECTION INTRAMUSCULAR; INTRAVENOUS at 13:28

## 2022-06-08 RX ADMIN — PROPOFOL 100 MCG/KG/MIN: 10 INJECTION, EMULSION INTRAVENOUS at 13:28

## 2022-06-08 RX ADMIN — CEFAZOLIN SODIUM 2000 MG: 2 SOLUTION INTRAVENOUS at 13:25

## 2022-06-08 RX ADMIN — SODIUM CHLORIDE, SODIUM LACTATE, POTASSIUM CHLORIDE, AND CALCIUM CHLORIDE: .6; .31; .03; .02 INJECTION, SOLUTION INTRAVENOUS at 13:25

## 2022-06-08 RX ADMIN — MIDAZOLAM 2 MG: 1 INJECTION INTRAMUSCULAR; INTRAVENOUS at 13:24

## 2022-06-08 RX ADMIN — FENTANYL CITRATE 25 MCG: 50 INJECTION INTRAMUSCULAR; INTRAVENOUS at 13:42

## 2022-06-08 NOTE — ANESTHESIA POSTPROCEDURE EVALUATION
Post-Op Assessment Note    CV Status:  Stable  Pain Score: 0    Pain management: adequate     Mental Status:  Alert and awake   Hydration Status:  Euvolemic   PONV Controlled:  Controlled   Airway Patency:  Patent      Post Op Vitals Reviewed: Yes            No complications documented      BP   117/67   Temp  98   Pulse  82   Resp   18   SpO2   94

## 2022-06-08 NOTE — ANESTHESIA PREPROCEDURE EVALUATION
Procedure:  TRANSPERINEAL MRI FUSION BIOPSY PROSTATE (N/A Perineum)    Relevant Problems   CARDIO   (+) Benign essential HTN   (+) Hypercholesterolemia      GI/HEPATIC   (+) Gastroesophageal reflux disease without esophagitis      /RENAL   (+) Prostate cancer (HCC)      MUSCULOSKELETAL   (+) Primary osteoarthritis of left knee        Physical Exam    Airway    Mallampati score: II  TM Distance: >3 FB  Neck ROM: full     Dental       Cardiovascular      Pulmonary      Other Findings        Anesthesia Plan  ASA Score- 2     Anesthesia Type- IV sedation with anesthesia with ASA Monitors  Additional Monitors:   Airway Plan:           Plan Factors-Exercise tolerance (METS): >4 METS  Chart reviewed  EKG reviewed  Imaging results reviewed  Existing labs reviewed  Patient summary reviewed  Patient is not a current smoker  Induction- intravenous  Postoperative Plan-     Informed Consent- Anesthetic plan and risks discussed with patient  I personally reviewed this patient with the CRNA  Discussed and agreed on the Anesthesia Plan with the CRNA           NPO appropriate  Discussed benefits/risks of monitored anesthetic care and discussed providing a dynamic level of mild to deep sedation  Risks include awareness, airway obstruction, aspiration which may necessitate conversion to general anesthesia  All questions answered  Patient understands and wishes to proceed  Anesthesia plan and consent discussed with Nany Paulino who expressed understanding and agreement  Risks/benefits and alternatives discussed with patient including possible PONV, sore throat, damage to teeth/lips/gums and possibility of rare anesthetic and surgical emergencies

## 2022-06-08 NOTE — OP NOTE
OPERATIVE REPORT  PATIENT NAME: Gilbert Gilford    :  1945  MRN: [id number]  Pt Location: [facility] OR ROOM 05    SURGERY DATE: 2022    Surgeon(s) and Role:     Rogelio Olsen MD - Primary    Preop Diagnosis:  Malignant neoplasm of prostate ([facility] ) Jose Wilson  Elevated prostate specific antigen (PSA) [R97 20]    Post-Op Diagnosis Codes:     * Malignant neoplasm of prostate (HCC) [C61]     * Elevated prostate specific antigen (PSA) [R97 20]    Procedure(s) (LRB):  TRANSPERINEAL MRI FUSION BIOPSY PROSTATE (N/A)   SATURATION BIOPSY, 25 CORES    Specimen(s):  ID Type Source Tests Collected by Time Destination   1 : RIGHT ANTERIOR MEDIAL Tissue Prostate TISSUE EXAM Lizbet Wyatt MD 2022 1316    2 : RIGHT BASE Tissue Prostate TISSUE EXAM Lizbet Wyatt MD 2022 1316    3 : RIGHT POSTERIOR MEDIAL Tissue Prostate TISSUE EXAM Lizbet Wyatt MD 2022 1316    4 : LEFT BASE Tissue Prostate TISSUE EXAM Lizbet Wyatt MD 2022 1316    5 : LEFT POSTERIOR MEDIAL Tissue Prostate TISSUE EXAM Lizbet Wyatt MD 2022 1316    6 : LEFT POSTERIOR LATERAL Tissue Prostate TISSUE EXAM Lizbet Wyatt MD 2022 1316    7 : LEFT ANTERIOR LATERAL Tissue Prostate TISSUE EXAM Lizbet Wyatt MD 2022 1316    8 : LEFT ANTERIOR MEDIAL Tissue Prostate TISSUE EXAM Lizbet Wyatt MD 2022 1316    9 : RIGHT POSTERIOR LATERAL Tissue Prostate TISSUE EXAM Lizbet Wyatt MD 2022 1316    10 : RIGHT ANTERIOR LATERAL Tissue Prostate TISSUE EXAM Lizbet Wyatt MD 2022 1316    11 : Region of Interest Tissue Prostate TISSUE EXAM Lizbet Wyatt MD 2022 1319        Estimated Blood Loss:   Minimal    Drains:  * No LDAs found *    Anesthesia Type:   IV Sedation with Anesthesia    Operative Indications:  Malignant neoplasm of prostate (HCC) [C61]  Elevated prostate specific antigen (PSA) [R97 20]  Prostate cancer surveillance    Operative Findings:  SATURATION BIOPSY, INCLUDING MRI ABNORMALITY, 25 CORES TOTAL    Complications:   None    Procedure and Technique:  Procedure was saturation transperineal biopsy utilizing the Precision Point perineal access device     77 y o  male with history of Holden 6/7 prostate cancer  He presents after multiparametric MRI was obtained of the prostate  There were lesions concerning so the patient was scheduled for transperineal fusion biopsy  He was pretreated with antibiotics  He was met in the prep and hold area and the procedure along with its risks and benefits were discussed and reviewed  Patient signed an informed consent  Transferred to OR  He was placed in dorsal lithotomy with care to pad all pressure points  His perineum and genitalia were prepped with a ChloraPrep solution  Sterile Iodoband was placed over the perineum above the rectum  Side fire biplanar transrectal ultrasound was performed and measurements of the prostate gland were taken as above  Biplanar transrectal ultrasound was placed in the patient's rectum  With the assistance of the technician, the prior obtained MRI images which had been form added for the abnormal lesions were mapped to the real-time ultrasound  In the midportion of the left and right prostate, a palak was denoted in the perineal skin  Skin wheal was elevated with 5 cc of 1% lidocaine plain  0 5% Marcaine on either side  Utilizing the [employer] access device, the perineum was accessed via finder needle  Ultrasound guidance was utilized to place the needle into the lesion that was mass at the midline anterior transition zone adjacent to urethra (ROI1)  Five specimens taken  We confirmed good position of the needle strikes utilizing the fusion software  On the right side of the prostate, we performed serial biopsies of the right posterior medial peripheral zone, right posterior lateral peripheral zone and moving up the anterior horn to the right anterior lateral and right anteromedial zone  Separate specimen was taken from the right-sided prostate base  Two cores taken from each  The PrecisionPoint device was repositioned into the left perineal stab  The biopsies were undertaken in the same fashion on the left side  Left posterior medial, left posterior lateral, left anterior lateral, left anteromedial and left base, 2 cores each  The transrectal ultrasound probe was removed  The Iodoband was retracted  Some gentle pressure was placed on the perineum for approximately 5 minutes  Direct pressure was held for 5 minutes for hemostasis  At the completion of the procedure, the patient was taken out of dorsal lithotomy, extubated and transferred to PACU in good condition  Overall the patient tolerated the procedure and there were no complications  Plan-the patient will return for biopsy pathology review in 2 weeks       I was present for the entire procedure    Patient Disposition:  PACU       SIGNATURE: John Fairbanks MD  DATE: June 8, 2022  TIME: 1:26 PM

## 2022-06-08 NOTE — H&P
Assessment  Zaheer 6/7 prostate cancer on active surveillance        Discussion  We discussed his stable PSA as well as his repeat MRI which is relatively unchanged from prior  I do recommend a confirmatory biopsy now that he is approximately 1 year out from his initial biopsy showing Hollenberg 3 + 3 and small volume 3+4 disease  We discussed a standard transrectal biopsy in the office versus a repeat MRI fusion biopsy  I am recommending a fusion biopsy and this is how the patient wishes to proceed  After the biopsy is performed he will return in follow-up for additional discussion  The patient is amenable with this plan      ADDENDUM:  Transperineal MRI fusion prostate biopsy as planned  Technique, risks, benefits reviewed  Consent obtained  Likely will need Thornton catheter due to significantly enlarged prostate and periurethral lesion  Jailene Blanco MD        History of Present Illness  77 y o  male with a history of an elevated PSA of 5 in 2019  This prompted transrectal ultrasound-guided biopsy the prostate which revealed some atypical glands   His PSA then chantal to 7  This prompted a multiparametric MRI of the prostate revealing a PI-RADS 5 lesion   More recently he underwent an MRI fusion biopsy which revealed Hollenberg 6 prostate cancer identified in 3 of the 4 cores in the target lesion   One additional core at the right apex revealed Hollenberg 3+4=7/10 prostate cancer   25% of the core was involved with tumor and 10% of the core was noted to be pattern 4   The patient has requested active surveillance although we have discussed external beam radiation therapy   Also discussed surgery, however, at 77years of age this would be pushing the envelope for robot assisted laparoscopic prostatectomy  José Miguel Margarita did have Prolaris genomic testing which revealed a 10 year disease specific mortality of just 2% and a 10 year risk of metastatic disease with primary treatment of 0 6%   These numbers are favorable and allow for active surveillance with caution        In 2021 he had a multiparametric MRI of the prostate which revealed a category 5 PIRADS lesion with an 87 g prostate  This was the MRI utilized for his fusion biopsy which revealed a small volume 7 disease  His PSA has been relatively stable and most recently is noted to be 5 9  In 2021 his PSA was 5 4         AUA Symptom Score      AUA SYMPTOM SCORE      Most Recent Value   AUA SYMPTOM SCORE     How often have you had a sensation of not emptying your bladder completely after you finished urinating? 1 (P)     How often have you had to urinate again less than two hours after you finished urinating? 2 (P)     How often have you found you stopped and started again several times when you urinate? 1 (P)     How often have you found it difficult to postpone urination? 2 (P)     How often have you had a weak urinary stream? 4 (P)     How often have you had to push or strain to begin urination? 1 (P)     How many times did you most typically get up to urinate from the time you went to bed at night until the time you got up in the morning? 5 (P)     Quality of Life: If you were to spend the rest of your life with your urinary condition just the way it is now, how would you feel about that? 2 (P)     AUA SYMPTOM SCORE 16 (P)              Review of Systems  Review of Systems   Constitutional: Negative  HENT: Negative  Eyes: Negative  Respiratory: Negative  Cardiovascular: Negative  Gastrointestinal: Negative  Endocrine: Negative  Genitourinary:        Per HPI   Musculoskeletal: Negative  Skin: Negative  Allergic/Immunologic: Negative  Neurological: Negative  Hematological: Negative      Psychiatric/Behavioral: Negative              Past Medical History  Medical History        Past Medical History:   Diagnosis Date    Aortic valve disorder      Basal cell carcinoma of nose       removed 07/2015    BPH with elevated PSA 12/11/2018    Hypercholesterolemia      Hyperlipidemia      Mitral valve disorder      Osteoarthritis      Seasonal allergies       resolved 01/21/2015            Past Social History  Surgical History         Past Surgical History:   Procedure Laterality Date    COLONOSCOPY        DENTAL SURGERY         1970's    KNEE SURGERY        OTHER SURGICAL HISTORY         cerumen removal 1st 05/26/2011;   2nd:  08/02/2012    NE BIOPSY OF PROSTATE,NEEDLE/PUNCH N/A 4/6/2021     Procedure: TRANSPERINEAL MRI FUSION PROSTATE BIOPSY;  Surgeon: Doug Díaz MD;  Location: [facility];  Service: Urology    PROSTATE BIOPSY                Past Family History        Family History   Problem Relation Age of Onset    Alcohol abuse Father           independently diagnosed     Heart disease Father      Cancer Father      Hypertension Mother      COPD Mother      Diabetes type II Mother      Hypertension Maternal Grandmother      Coronary artery disease Maternal Grandmother      Diabetes Maternal Grandmother      Lung cancer Maternal Grandmother      Coronary artery disease Family      Stroke Paternal Grandmother      Lung cancer Maternal Grandfather           Past Social history  Social History               Socioeconomic History    Marital status: /Civil Union       Spouse name: Not on file    Number of children: Not on file    Years of education: Not on file    Highest education level: Not on file   Occupational History    Occupation:    Tobacco Use    Smoking status: Never Smoker    Smokeless tobacco: Never Used   Vaping Use    Vaping Use: Never used   Substance and Sexual Activity    Alcohol use:  Yes       Alcohol/week: 1 0 standard drink       Types: 1 Glasses of wine per week       Comment: socially    Drug use: No    Sexual activity: Not on file   Other Topics Concern    Not on file   Social History Narrative     Advance direct of file      Caffeine use -  He admits to consuming caffeine via coffee; 2-3 cups per week; and tea 2 cups per day      Uses safety belts       Social Determinants of Health      Financial Resource Strain: Not on file   Food Insecurity: Not on file   Transportation Needs: Not on file   Physical Activity: Not on file   Stress: Not on file   Social Connections: Not on file   Intimate Partner Violence: Not on file   Housing Stability: Not on file            Current Medications  Current Medications          Current Outpatient Medications   Medication Sig Dispense Refill    Ascorbic Acid (VITAMIN C ER) 500 MG CPCR Take 1 capsule by mouth daily        aspirin (ECOTRIN LOW STRENGTH) 81 mg EC tablet Take 81 mg by mouth daily        co-enzyme Q-10 100 mg capsule Take 100 mg by mouth daily        ezetimibe (ZETIA) 10 mg tablet Take 1 tablet (10 mg total) by mouth daily 90 tablet 1    ketoconazole (NIZORAL) 2 % shampoo          Multiple Vitamins-Minerals (MULTIVITAMIN ADULT) TABS Take 1 tablet by mouth daily        Omega-3 Fatty Acids (FISH OIL) 1200 MG CAPS Take 2 capsules by mouth daily           No current facility-administered medications for this visit             Allergies  No Known Allergies     Past Medical History, Social History, Family History, medications and allergies were reviewed      Vitals  /74   Pulse 85   Temp (!) 97 °F (36 1 °C) (Temporal)   Resp 16   Ht 5' 10" (1 778 m)   Wt 83 9 kg (185 lb)   SpO2 95%   BMI 26 54 kg/m²               Physical Exam   Physical Exam  Vitals reviewed  Constitutional:       General: He is not in acute distress  Appearance: Normal appearance  He is normal weight  He is not ill-appearing, toxic-appearing or diaphoretic  HENT:      Head: Normocephalic and atraumatic  Right Ear: External ear normal       Left Ear: External ear normal       Nose: Nose normal    Eyes:      General: No scleral icterus  Conjunctiva/sclera: Conjunctivae normal       Pupils: Pupils are equal, round, and reactive to light     Neck: Vascular: No carotid bruit or JVD  Trachea: No tracheal deviation  Cardiovascular:      Rate and Rhythm: Normal rate and regular rhythm  Pulses: Normal pulses  Heart sounds: Normal heart sounds  No murmur heard  Pulmonary:      Effort: Pulmonary effort is normal  No respiratory distress  Breath sounds: Normal breath sounds  No rales  Abdominal:      General: Abdomen is flat  There is no distension  Musculoskeletal:         General: Deformity (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) present  No swelling  Cervical back: Neck supple  Right lower leg: No edema  Left lower leg: No edema  Skin:     General: Skin is warm  Coloration: Skin is not jaundiced  Findings: No bruising, erythema or rash  Neurological:      General: No focal deficit present  Mental Status: He is alert and oriented to person, place, and time  Mental status is at baseline     Psychiatric:         Mood and Affect: Mood normal          Behavior: Behavior normal       Results        Lab Results   Component Value Date     PSA 5 9 (H) 04/25/2022     PSA 5 0 (H) 12/23/2021     PSA 5 4 (H) 04/20/2021            Lab Results   Component Value Date     CALCIUM 8 8 03/29/2021     K 4 1 03/29/2021     CO2 26 03/29/2021      03/29/2021     BUN 15 03/29/2021     CREATININE 0 96 03/29/2021            Lab Results   Component Value Date     WBC 4 26 (L) 03/29/2021     HGB 15 4 03/29/2021     HCT 46 4 03/29/2021     MCV 96 03/29/2021      03/29/2021

## 2022-06-23 ENCOUNTER — TELEPHONE (OUTPATIENT)
Dept: OTHER | Facility: OTHER | Age: 77
End: 2022-06-23

## 2022-06-23 NOTE — TELEPHONE ENCOUNTER
Pt  Has a follow up appointment with Dr Sheeba Chávez next week concerning his biopsy  He said he has had this done before and everytime they always wanted a PSA test done  Nothing was mentioned and he wants to know if he should get one before the appointment  Pt  Is requesting a call back   [phone]

## 2022-06-23 NOTE — TELEPHONE ENCOUNTER
Called Darwin Primrose back and explained he had PSA in April and this appointment is to [facility] MRI/PRostate biopsy   He understood

## 2022-06-30 ENCOUNTER — OFFICE VISIT (OUTPATIENT)
Dept: UROLOGY | Facility: AMBULATORY SURGERY CENTER | Age: 77
End: 2022-06-30
Payer: MEDICARE

## 2022-06-30 VITALS
HEART RATE: 93 BPM | WEIGHT: 185 LBS | BODY MASS INDEX: 26.54 KG/M2 | DIASTOLIC BLOOD PRESSURE: 80 MMHG | SYSTOLIC BLOOD PRESSURE: 148 MMHG | OXYGEN SATURATION: 97 %

## 2022-06-30 DIAGNOSIS — C61 PROSTATE CANCER (HCC): Primary | ICD-10-CM

## 2022-06-30 PROCEDURE — 99214 OFFICE O/P EST MOD 30 MIN: CPT | Performed by: UROLOGY

## 2022-06-30 NOTE — PROGRESS NOTES
6/30/2022    Horacio Guo  1945  [id number]        Assessment  History of elevated PSA, negative transrectal ultrasound-guided biopsy the prostate, [place] 6/7 prostate cancer on active surveillance    Discussion  I had a lengthy discussion with Naveen Kamara in the office today regarding his most recent and 3rd biopsy which actually shows no evidence of prostate cancer  We discussed that his small volume Zaheer 3 + 3 and 3+4 disease still remains within the prostate, however, his recent biopsy is indicative that he is not having disease progression  He is very comfortable with continuing active surveillance and return in follow-up in 6 months with his next PSA  We will repeat a multiparametric MRI of the prostate in the next 1 year  History of Present Illness  77 y o  male with a history of an elevated PSA  This prompted transrectal ultrasound-guided biopsy the prostate performed in 2019 which was negative for malignancy, however, atypical cells were identified  This prompted a multiparametric MRI when his PSA was 7  This revealed a PIRADS 5 lesion  He had an MRI fusion biopsy performed in 2021 by Dr Hollis Husbands that revealed 3 cores of Zaheer 3 + 3 disease as well as 1 core of Zaheer 3 + 4 disease  He made a decision to continue with active surveillance at that time  Prolaris genomic testing revealed a 2% risk of 10 year disease specific mortality as well as a less than 1% chance of metastatic disease with primary treatment at 10 years  Based on these numbers we continued on active surveillance  He had a repeat MRI of the prostate performed in April of 2022  This showed a 1 6 cm lesion at the prostate apex which was given a PIRADS 5 assessment  This prompted his 3rd prostate biopsy and 2nd MRI fusion biopsy which was recently performed  He returns in follow-up today  All cores were negative for malignancy  One cord reveals high-grade prostatic intraepithelial neoplasia          AUA Symptom Score  AUA SYMPTOM SCORE    Flowsheet Row Most Recent Value   AUA SYMPTOM SCORE    How often have you had a sensation of not emptying your bladder completely after you finished urinating? 1 (P)     How often have you had to urinate again less than two hours after you finished urinating? 2 (P)     How often have you found you stopped and started again several times when you urinate? 1 (P)     How often have you found it difficult to postpone urination? 2 (P)     How often have you had a weak urinary stream? 4 (P)     How often have you had to push or strain to begin urination? 1 (P)     How many times did you most typically get up to urinate from the time you went to bed at night until the time you got up in the morning? 5 (P)     Quality of Life: If you were to spend the rest of your life with your urinary condition just the way it is now, how would you feel about that? 3 (P)     AUA SYMPTOM SCORE 16 (P)           Review of Systems  Review of Systems   Constitutional: Negative  HENT: Negative  Eyes: Negative  Respiratory: Negative  Cardiovascular: Negative  Gastrointestinal: Negative  Endocrine: Negative  Genitourinary:        Per HPI   Musculoskeletal: Negative  Skin: Negative  Allergic/Immunologic: Negative  Neurological: Negative  Hematological: Negative  Psychiatric/Behavioral: Negative            Past Medical History  Past Medical History:   Diagnosis Date    Basal cell carcinoma of nose     removed 07/2015    BPH with elevated PSA 12/11/2018    Cancer ([facility] [address] )     Hypercholesterolemia     Hyperlipidemia     Mitral valve disorder     Osteoarthritis     Seasonal allergies     resolved 01/21/2015       Past Social History  Past Surgical History:   Procedure Laterality Date    COLONOSCOPY      DENTAL SURGERY      1970's    KNEE SURGERY      OTHER SURGICAL HISTORY      cerumen removal 1st 05/26/2011;   2nd:  08/02/2012    [place] BIOPSY OF PROSTATE,NEEDLE,TRANSPERINEAL N/A 6/8/2022    Procedure: TRANSPERINEAL MRI FUSION BIOPSY PROSTATE;  Surgeon: Joanne Hatfield MD;  [facility];  Service: Urology    NM BIOPSY OF [address] [place] [address] N/A 4/6/2021    Procedure: TRANSPERINEAL MRI FUSION PROSTATE BIOPSY;  Surgeon: Radha Jain MD;  [facility];  Service: Urology    PROSTATE BIOPSY         Past Family History  Family History   Problem Relation Age of Onset    Alcohol abuse Father         independently diagnosed     Heart disease Father     Cancer Father     Hypertension Mother     COPD Mother     Diabetes type II Mother     Hypertension Maternal Grandmother     Coronary artery disease Maternal Grandmother     Diabetes Maternal Grandmother     Lung cancer Maternal Grandmother     Coronary artery disease Family     Stroke Paternal Grandmother     Lung cancer Maternal Grandfather        Past Social history  Social History     Socioeconomic History    Marital status: /Civil Union     Spouse name: Not on file    Number of children: Not on file    Years of education: Not on file    Highest education level: Not on file   Occupational History    Occupation:    Tobacco Use    Smoking status: Never Smoker    Smokeless tobacco: Never Used   Vaping Use    Vaping Use: Never used   Substance and Sexual Activity    Alcohol use:  Yes     Alcohol/week: 1 0 standard drink     Types: 1 Glasses of wine per week     Comment: 1-2 weekly    Drug use: No    Sexual activity: Not on file   Other Topics Concern    Not on file   Social History Narrative    Advance direct of file     Caffeine use -  He admits to consuming caffeine via coffee; 2-3 cups per week; and tea 2 cups per day     Uses safety belts      Social Determinants of Health     Financial Resource Strain: Not on file   Food Insecurity: Not on file   Transportation Needs: Not on file   Physical Activity: Not on file   Stress: Not on file   Social Connections: Not on file   Intimate Partner Violence: Not on file   Housing Stability: Not on file       Current Medications  Current Outpatient Medications   Medication Sig Dispense Refill    Ascorbic Acid (VITAMIN C ER) 500 MG CPCR Take 1 capsule by mouth daily      aspirin (ECOTRIN LOW STRENGTH) 81 mg EC tablet Take 81 mg by mouth daily      co-enzyme Q-10 100 mg capsule Take 100 mg by mouth daily      ezetimibe (ZETIA) 10 mg tablet Take 1 tablet (10 mg total) by mouth daily 90 tablet 1    ketoconazole (NIZORAL) 2 % shampoo       Multiple Vitamins-Minerals (MULTIVITAMIN ADULT) TABS Take 1 tablet by mouth daily      Omega-3 Fatty Acids (FISH OIL) 1200 MG CAPS Take 2 capsules by mouth daily        No current facility-administered medications for this visit  Allergies  No Known Allergies    Past Medical History, Social History, Family History, medications and allergies were reviewed  Vitals  There were no vitals filed for this visit  Physical Exam  Physical Exam  On examination he is in no acute distress  Gait normal   Affect normal    Results  Lab Results   Component Value Date    PSA 5 9 (H) 04/25/2022    PSA 5 0 (H) 12/23/2021    PSA 5 4 (H) 04/20/2021     Lab Results   Component Value Date    CALCIUM 8 8 03/29/2021    K 4 1 03/29/2021    CO2 26 03/29/2021     03/29/2021    BUN 15 03/29/2021    CREATININE 0 96 03/29/2021     Lab Results   Component Value Date    WBC 4 26 (L) 03/29/2021    HGB 15 4 03/29/2021    HCT 46 4 03/29/2021    MCV 96 03/29/2021     03/29/2021         Office Urine Dip  No results found for this or any previous visit (from the past 1 hour(s))  ]      Total visit time was 30 minutes of which over 50% was spent on counseling

## 2022-12-09 ENCOUNTER — RA CDI HCC (OUTPATIENT)
Dept: OTHER | Facility: HOSPITAL | Age: 77
End: 2022-12-09

## 2022-12-12 ENCOUNTER — TELEPHONE (OUTPATIENT)
Dept: INTERNAL MEDICINE CLINIC | Facility: CLINIC | Age: 77
End: 2022-12-12

## 2022-12-12 NOTE — TELEPHONE ENCOUNTER
Patient stated he has went on a trip and when he came back he tested positive for COVID on 12/10/22  His symptoms have improved and is unsure if it is neccessary for him to schedule an appointment   He does have an upcoming appointment on 12/15/22 for a AWV and would like to know if it's okay for him to still come in?

## 2022-12-14 RX ORDER — MULTIVIT-MIN/IRON/FOLIC ACID/K 18-600-40
CAPSULE ORAL DAILY
COMMUNITY
Start: 2021-11-28

## 2022-12-15 ENCOUNTER — OFFICE VISIT (OUTPATIENT)
Dept: INTERNAL MEDICINE CLINIC | Facility: CLINIC | Age: 77
End: 2022-12-15

## 2022-12-15 VITALS
TEMPERATURE: 98 F | BODY MASS INDEX: 26.92 KG/M2 | SYSTOLIC BLOOD PRESSURE: 132 MMHG | WEIGHT: 188 LBS | DIASTOLIC BLOOD PRESSURE: 74 MMHG | HEIGHT: 70 IN | OXYGEN SATURATION: 99 % | HEART RATE: 94 BPM

## 2022-12-15 DIAGNOSIS — Z00.00 MEDICARE ANNUAL WELLNESS VISIT, SUBSEQUENT: ICD-10-CM

## 2022-12-15 DIAGNOSIS — C61 PROSTATE CANCER (HCC): ICD-10-CM

## 2022-12-15 DIAGNOSIS — E78.00 HYPERCHOLESTEROLEMIA: ICD-10-CM

## 2022-12-15 DIAGNOSIS — I10 BENIGN ESSENTIAL HTN: Primary | ICD-10-CM

## 2022-12-15 DIAGNOSIS — M17.12 PRIMARY OSTEOARTHRITIS OF LEFT KNEE: ICD-10-CM

## 2022-12-15 RX ORDER — EZETIMIBE 10 MG/1
10 TABLET ORAL DAILY
Qty: 90 TABLET | Refills: 1 | Status: SHIPPED | OUTPATIENT
Start: 2022-12-15 | End: 2023-06-13

## 2022-12-15 NOTE — PROGRESS NOTES
Assessment and Plan:     Problem List Items Addressed This Visit        Cardiovascular and Mediastinum    Benign essential HTN - Primary     Blood pressure is stable and does not require addition of any medications at this time         Relevant Orders    CT coronary calcium score    CBC and Platelet    Comprehensive metabolic panel    Lipid panel       Musculoskeletal and Integument    Primary osteoarthritis of left knee     Developing genu valgum deformity  Patient will look into and the possibility of partial knee arthroplasty            Genitourinary    Prostate cancer [facility])     Follows with Urology  Would most likely be in favor of cautious observation at this point  Other    Hypercholesterolemia     Lipids are stable continues ED a 10 mg daily along with diet and omega-3 fatty acids         Relevant Medications    ezetimibe (ZETIA) 10 mg tablet    Other Relevant Orders    CT coronary calcium score    CBC and Platelet    Comprehensive metabolic panel    Lipid panel    Medicare annual wellness visit, subsequent     Patient is up-to-date with age-appropriate immunizations and screenings             Preventive health issues were discussed with patient, and age appropriate screening tests were ordered as noted in patient's After Visit Summary  Personalized health advice and appropriate referrals for health education or preventive services given if needed, as noted in patient's After Visit Summary  History of Present Illness:     Patient presents for a Medicare Wellness Visit    Patient presents to the office for follow-up visit and a Medicare wellness visit  He offers no major complaints  He was recently diagnosed with COVID last week  He has finished 5 days of isolation and is now at the masking stage  He feels well  He has some residual headache and sinus congestion but otherwise has no complaints  His wife is having similar symptoms but has tested COVID negative  Labs are reviewed    His cholesterol profile has remained relatively the same  Metabolic panel is unremarkable as is his CBC  No complaints of chest pain or discomfort  Consulting with Urology regarding his prostate biopsy results and the finding of some isolated areas of [place] 6 and [place] 7 prostate cancer  His most recent series of prostate biopsies were negative for adenocarcinoma  Given his age and this paucity of cancerous tissue I would probably be more in favor of cautious observation before considering any additional treatment  We discussed some additional testing that could be done in regard to screening for coronary artery disease such as coronary calcium scores  Patient has had issues in the past with cerumen impactions  He notes no hearing issues  During a recent flight he did experience some hearing loss secondary to some ear congestion which eventually resolved  Patient is also concerned about the development of some genu valgum of his left knee  He does have some significant arthritis involving the lateral compartment of his left knee which also brought up the consideration of a partial knee arthroplasty  Patient Care Team:  Catrina Pike MD as PCP - General     Review of Systems:     Review of Systems   Constitutional: Negative  HENT: Positive for congestion (Residual from recent COVID infection)  Eyes: Negative  Respiratory: Negative  Cardiovascular: Negative  Gastrointestinal: Negative  Endocrine: Negative  Genitourinary: Negative  Musculoskeletal: Positive for arthralgias (Bilateral knees stiffness, left greater than right)  Skin: Negative  Allergic/Immunologic: Negative  Neurological: Negative  Hematological: Negative  Psychiatric/Behavioral: Negative           Problem List:     Patient Active Problem List   Diagnosis   • Benign essential HTN   • Prostate cancer ([facility] [address] )   • Hypercholesterolemia   • Bilateral impacted cerumen   • Drug reaction   • Primary osteoarthritis of left knee   • Medicare annual wellness visit, subsequent   • Gastroesophageal reflux disease without esophagitis      Past Medical and Surgical History:     Past Medical History:   Diagnosis Date   • Arthritis 2018    Knee   • Basal cell carcinoma of nose     removed 07/2015   • BPH with elevated PSA 12/11/2018   • Cancer ([address] )    • Hypercholesterolemia    • Hyperlipidemia    • Mitral valve disorder    • Osteoarthritis    • Seasonal allergies     resolved 01/21/2015     Past Surgical History:   Procedure Laterality Date   • COLONOSCOPY     • DENTAL SURGERY      1970's   • KNEE SURGERY     • OTHER SURGICAL HISTORY      cerumen removal 1st 05/26/2011;   2nd:  08/02/2012   • SD BIOPSY OF PROSTATE,NEEDLE,TRANSPERINEAL N/A 6/8/2022    Procedure: TRANSPERINEAL MRI FUSION BIOPSY PROSTATE;  Surgeon: Julee Peabody, MD;  [facility];  Service: Urology   • SD BIOPSY OF [address] [place] [address] N/A 4/6/2021    Procedure: TRANSPERINEAL MRI FUSION PROSTATE BIOPSY;  Surgeon: Beckie Allen MD;  [facility];  Service: Urology   • PROSTATE BIOPSY        Family History:     Family History   Problem Relation Age of Onset   • Alcohol abuse Father         independently diagnosed    • Heart disease Father    • Cancer Father    • Hypertension Mother    • COPD Mother    • Diabetes type II Mother    • Hypertension Maternal Grandmother    • Coronary artery disease Maternal Grandmother    • Diabetes Maternal Grandmother    • Lung cancer Maternal Grandmother    • Coronary artery disease Family    • Stroke Paternal Grandmother    • Lung cancer Maternal Grandfather       Social History:     Social History     Socioeconomic History   • Marital status: /Civil Union     Spouse name: None   • Number of children: None   • Years of education: None   • Highest education level: None   Occupational History   • Occupation:    Tobacco Use   • Smoking status: Never   • Smokeless tobacco: Never   Vaping Use • Vaping Use: Never used   Substance and Sexual Activity   • Alcohol use: Yes     Comment: Less than one drink per week   • Drug use: No   • Sexual activity: None   Other Topics Concern   • None   Social History Narrative    Advance direct of file     Caffeine use -  He admits to consuming caffeine via coffee; 2-3 cups per week; and tea 2 cups per day     Uses safety belts      Social Determinants of Health     Financial Resource Strain: Low Risk    • Difficulty of Paying Living Expenses: Not hard at all   Food Insecurity: Not on file   Transportation Needs: No Transportation Needs   • Lack of Transportation (Medical): No   • Lack of Transportation (Non-Medical): No   Physical Activity: Not on file   Stress: Not on file   Social Connections: Not on file   Intimate Partner Violence: Not on file   Housing Stability: Not on file      Medications and Allergies:     Current Outpatient Medications   Medication Sig Dispense Refill   • Ascorbic Acid (VITAMIN C ER) 500 MG CPCR Take 1 capsule by mouth daily     • aspirin (ECOTRIN LOW STRENGTH) 81 mg EC tablet Take 81 mg by mouth daily     • co-enzyme Q-10 100 mg capsule Take 100 mg by mouth daily     • ezetimibe (ZETIA) 10 mg tablet Take 1 tablet (10 mg total) by mouth daily 90 tablet 1   • ketoconazole (NIZORAL) 2 % shampoo if needed     • Multiple Vitamins-Minerals (MULTIVITAMIN ADULT) TABS Take 1 tablet by mouth daily     • Omega-3 Fatty Acids (FISH OIL) 1200 MG CAPS Take 2 capsules by mouth daily      • Vitamin D, Cholecalciferol, 25 MCG (1000 UT) TABS in the morning       No current facility-administered medications for this visit       No Known Allergies   Immunizations:     Immunization History   Administered Date(s) Administered   • COVID-19 MODERNA VACC 0 5 ML IM 01/24/2021, 02/20/2021, 11/04/2021, 09/23/2022   • INFLUENZA 10/17/2014, 10/30/2015, 11/14/2016, 10/24/2018, 10/11/2021   • Influenza Split High Dose Preservative Free IM 10/02/2017, 10/24/2018   • Influenza, high dose seasonal 0 7 mL 10/03/2019, 10/15/2020, 10/27/2022   • Influenza, seasonal, injectable 12/15/2011, 11/16/2012, 11/05/2014, 10/01/2015   • Pneumococcal Conjugate 13-Valent 02/24/2016   • Pneumococcal Polysaccharide PPV23 06/05/2014   • Zoster 12/19/2013      Health Maintenance:         Topic Date Due   • Hepatitis C Screening  Completed   • Colorectal Cancer Screening  Discontinued         Topic Date Due   • Hepatitis B Vaccine (1 of 3 - 3-dose series) Never done      Medicare Screening Tests and Risk Assessments:     Iva Glass is here for his Subsequent Wellness visit  Health Risk Assessment:   Patient rates overall health as very good  Patient feels that their physical health rating is same  Patient is satisfied with their life  Eyesight was rated as same  Hearing was rated as same  Patient feels that their emotional and mental health rating is same  Patients states they are never, rarely angry  Patient states they are sometimes unusually tired/fatigued  Pain experienced in the last 7 days has been none  Patient states that he has experienced no weight loss or gain in last 6 months  Depression Screening:   PHQ-2 Score: 0      Fall Risk Screening: In the past year, patient has experienced: no history of falling in past year      Home Safety:  Patient does not have trouble with stairs inside or outside of their home  Patient has working smoke alarms and has working carbon monoxide detector  Home safety hazards include: none  Nutrition:   Current diet is Regular  Medications:   Patient is currently taking over-the-counter supplements  OTC medications include: Omega 3, multivitamins, CQ10  Vit C, Vit D  Patient is able to manage medications  Activities of Daily Living (ADLs)/Instrumental Activities of Daily Living (IADLs):   Walk and transfer into and out of bed and chair?: Yes  Dress and groom yourself?: Yes    Bathe or shower yourself?: Yes    Feed yourself?  Yes  Do your laundry/housekeeping?: Yes  Manage your money, pay your bills and track your expenses?: Yes  Make your own meals?: Yes    Do your own shopping?: Yes    Durable Medical Equipment Suppliers  None    Previous Hospitalizations:   Any hospitalizations or ED visits within the last 12 months?: No      Advance Care Planning:   Living will: Yes    Durable POA for healthcare: Yes    Advanced directive: Yes    Advanced directive counseling given: No    Five wishes given: No    Patient declined ACP directive: Yes    End of Life Decisions reviewed with patient: Yes    Provider agrees with end of life decisions: No      Cognitive Screening:   Provider or family/friend/caregiver concerned regarding cognition?: No    PREVENTIVE SCREENINGS      Cardiovascular Screening:    General: Screening Not Indicated and History Lipid Disorder      Diabetes Screening:     General: Screening Current      Colorectal Cancer Screening:     General: Screening Current      Prostate Cancer Screening:    General: History Prostate Cancer, Screening Not Indicated and Risks and Benefits Discussed      Osteoporosis Screening:    General: Screening Not Indicated      Abdominal Aortic Aneurysm (AAA) Screening:        General: Screening Not Indicated      Lung Cancer Screening:     General: Screening Not Indicated      Hepatitis C Screening:    General: Screening Current    Screening, Brief Intervention, and Referral to Treatment (SBIRT)    Screening  Typical number of drinks in a day: 0  Typical number of drinks in a week: 2  Interpretation: Low risk drinking behavior  AUDIT-C Screenin) How often did you have a drink containing alcohol in the past year? 2 to 4 times a month  2) How many drinks did you have on a typical day when you were drinking in the past year?  1 to 2  3) How often did you have 6 or more drinks on one occasion in the past year? never    AUDIT-C Score: 2  Interpretation: Score 0-3 (male): Negative screen for alcohol misuse    Single Item Drug Screening:  How often have you used an illegal drug (including marijuana) or a prescription medication for non-medical reasons in the past year? never    Single Item Drug Screen Score: 0  Interpretation: Negative screen for possible drug use disorder    No results found  Physical Exam:     /74 (BP Location: Left arm, Patient Position: Sitting, Cuff Size: Standard)   Pulse 94   Temp 98 °F (36 7 °C) (Tympanic)   Ht 5' 10 39" (1 788 m)   Wt 85 3 kg (188 lb)   SpO2 99%   BMI 26 67 kg/m²     Physical Exam  Vitals reviewed  Constitutional:       General: He is not in acute distress  Appearance: He is well-developed and normal weight  He is not ill-appearing, toxic-appearing or diaphoretic  HENT:      Head: Normocephalic and atraumatic  Right Ear: Tympanic membrane, ear canal and external ear normal  There is no impacted cerumen  Left Ear: Tympanic membrane, ear canal and external ear normal  There is no impacted cerumen  Nose: Nose normal    Eyes:      General: No scleral icterus  Conjunctiva/sclera: Conjunctivae normal       Pupils: Pupils are equal, round, and reactive to light  Neck:      Vascular: No carotid bruit  Cardiovascular:      Rate and Rhythm: Normal rate and regular rhythm  Pulmonary:      Effort: Pulmonary effort is normal  No respiratory distress  Abdominal:      General: Abdomen is flat  Bowel sounds are normal  There is no distension  Tenderness: There is no abdominal tenderness  Musculoskeletal:         General: Deformity (Genu valgum left knee) present  No swelling or tenderness  Normal range of motion  Cervical back: Normal range of motion and neck supple  Right lower leg: No edema  Left lower leg: No edema  Skin:     General: Skin is dry  Coloration: Skin is not jaundiced  Findings: No bruising, erythema or rash  Neurological:      General: No focal deficit present        Mental Status: He is alert and oriented to person, place, and time  Mental status is at baseline  Psychiatric:         Mood and Affect: Mood normal          Behavior: Behavior normal          Thought Content:  Thought content normal          Judgment: Judgment normal           Timi Lovett MD

## 2022-12-15 NOTE — ASSESSMENT & PLAN NOTE
Developing genu valgum deformity    Patient will look into and the possibility of partial knee arthroplasty

## 2022-12-28 ENCOUNTER — APPOINTMENT (OUTPATIENT)
Dept: LAB | Facility: CLINIC | Age: 77
End: 2022-12-28

## 2022-12-28 DIAGNOSIS — C61 PROSTATE CANCER (HCC): ICD-10-CM

## 2022-12-28 LAB — PSA SERPL-MCNC: 6.2 NG/ML (ref 0–4)

## 2023-01-03 ENCOUNTER — OFFICE VISIT (OUTPATIENT)
Dept: UROLOGY | Facility: AMBULATORY SURGERY CENTER | Age: 78
End: 2023-01-03

## 2023-01-03 VITALS
BODY MASS INDEX: 26.48 KG/M2 | RESPIRATION RATE: 18 BRPM | HEART RATE: 105 BPM | DIASTOLIC BLOOD PRESSURE: 82 MMHG | HEIGHT: 70 IN | WEIGHT: 185 LBS | SYSTOLIC BLOOD PRESSURE: 144 MMHG | OXYGEN SATURATION: 96 %

## 2023-01-03 DIAGNOSIS — C61 PROSTATE CANCER (HCC): Primary | ICD-10-CM

## 2023-01-03 LAB — POST-VOID RESIDUAL VOLUME, ML POC: 0 ML

## 2023-01-03 NOTE — PROGRESS NOTES
1/3/2023    Jonny Ruiz  1945  [id number]      Assessment  -[place] 6 prostate cancer on active surveillance    Discussion/Plan  George Aguirre is a 69 y o  male being managed by Dr Zena Herrera  1  [place] 6 prostate cancer on active surveillance-    -All questions answered, patient agrees with plan      History of Present Illness  69 y o  male with a history of [place] 6 prostate cancer presents today for follow up  Patient last seen in the office in June 2022         Review of Systems  Review of Systems    Past Medical History  Past Medical History:   Diagnosis Date   • Arthritis 2018    Knee   • Basal cell carcinoma of nose     removed 07/2015   • BPH with elevated PSA 12/11/2018   • Cancer ([address] )    • Hypercholesterolemia    • Hyperlipidemia    • Mitral valve disorder    • Osteoarthritis    • Seasonal allergies     resolved 01/21/2015       Past Social History  Past Surgical History:   Procedure Laterality Date   • COLONOSCOPY     • DENTAL SURGERY      1970's   • KNEE SURGERY     • OTHER SURGICAL HISTORY      cerumen removal 1st 05/26/2011;   2nd:  08/02/2012   • OH BX PROSTATE STRTCTC SATURATION SAMPLING IMG GID N/A 6/8/2022    Procedure: TRANSPERINEAL MRI FUSION BIOPSY PROSTATE;  Surgeon: Malaika Marti MD;  [facility];  Service: Urology   • OH PROSTATE NEEDLE BIOPSY ANY APPROACH N/A 4/6/2021    Procedure: TRANSPERINEAL MRI FUSION PROSTATE BIOPSY;  Surgeon: Ana Norton MD;  [facility];  Service: Urology   • PROSTATE BIOPSY         Past Family History  Family History   Problem Relation Age of Onset   • Alcohol abuse Father         independently diagnosed    • Heart disease Father    • Cancer Father    • Hypertension Mother    • COPD Mother    • Diabetes type II Mother    • Hypertension Maternal Grandmother    • Coronary artery disease Maternal Grandmother    • Diabetes Maternal Grandmother    • Lung cancer Maternal Grandmother    • Coronary artery disease Family    • Stroke Paternal Grandmother    • Lung cancer Maternal Grandfather        Past Social history  Social History     Socioeconomic History   • Marital status: /Civil Union     Spouse name: Not on file   • Number of children: Not on file   • Years of education: Not on file   • Highest education level: Not on file   Occupational History   • Occupation:    Tobacco Use   • Smoking status: Never   • Smokeless tobacco: Never   Vaping Use   • Vaping Use: Never used   Substance and Sexual Activity   • Alcohol use: Yes     Comment: Less than one drink per week   • Drug use: No   • Sexual activity: Not on file   Other Topics Concern   • Not on file   Social History Narrative    Advance direct of file     Caffeine use -  He admits to consuming caffeine via coffee; 2-3 cups per week; and tea 2 cups per day     Uses safety belts      Social Determinants of Health     Financial Resource Strain: Low Risk    • Difficulty of Paying Living Expenses: Not hard at all   Food Insecurity: Not on file   Transportation Needs: No Transportation Needs   • Lack of Transportation (Medical): No   • Lack of Transportation (Non-Medical):  No   Physical Activity: Not on file   Stress: Not on file   Social Connections: Not on file   Intimate Partner Violence: Not on file   Housing Stability: Not on file       Current Medications  Current Outpatient Medications   Medication Sig Dispense Refill   • Ascorbic Acid (VITAMIN C ER) 500 MG CPCR Take 1 capsule by mouth daily     • aspirin (ECOTRIN LOW STRENGTH) 81 mg EC tablet Take 81 mg by mouth daily     • co-enzyme Q-10 100 mg capsule Take 100 mg by mouth daily     • ezetimibe (ZETIA) 10 mg tablet Take 1 tablet (10 mg total) by mouth daily 90 tablet 1   • ketoconazole (NIZORAL) 2 % shampoo if needed     • Multiple Vitamins-Minerals (MULTIVITAMIN ADULT) TABS Take 1 tablet by mouth daily     • Omega-3 Fatty Acids (FISH OIL) 1200 MG CAPS Take 2 capsules by mouth daily      • Vitamin D, Cholecalciferol, 25 MCG (1000 UT) TABS in the morning       No current facility-administered medications for this visit  Allergies  No Known Allergies    Past Medical History, Social History, Family History, medications and allergies were reviewed  Vitals  There were no vitals filed for this visit  Physical Exam  Physical Exam    Results    I have personally reviewed all pertinent lab results and reviewed with patient  Lab Results   Component Value Date    PSA 6 2 (H) 12/28/2022    PSA 5 9 (H) 04/25/2022    PSA 5 0 (H) 12/23/2021     Lab Results   Component Value Date    CALCIUM 8 8 03/29/2021    K 4 1 03/29/2021    CO2 26 03/29/2021     03/29/2021    BUN 15 03/29/2021    CREATININE 0 96 03/29/2021     Lab Results   Component Value Date    WBC 4 26 (L) 03/29/2021    HGB 15 4 03/29/2021    HCT 46 4 03/29/2021    MCV 96 03/29/2021     03/29/2021     No results found for this or any previous visit (from the past 1 hour(s))

## 2023-01-03 NOTE — PROGRESS NOTES
Office Visit- Urology  Jorge Lewis 1945 MRN: [id number]      Assessment/Discussion/Plan    69 y o  male managed by Dr Matty Canales    1  [place] 6/7 Prostate Cancer on active surveillance  -MRI fusion prostate biopsy in April 2021 revealed [place] 6 prostate cancer at the target lesion as well as a core of 7 (3+4) prostate cancer  His most recent prostate biopsy in June 2022 did not demonstrate prostate cancer  Patient is currently on active surveillance   -[facility]aris genomic testing revealed a 2% risk of 10-year disease specific mortality as well as a less than 1% chance of metastatic disease with primary treatment at 10 years  -Multi parametric MRI of the prostate performed in April 2022 revealed the known clinically significant prostate cancer with no significant interval change  -PSA 6 2 12/2022 up from 5 9 in 4/2022  Peak PSA of 6 75 in December 2020   -Long discussion held with patient about active surveillance       -Plan to continue active surveillance  Patient to obtain PSA and multiparametric MRI of the prostate in 3 months  Patient to follow up in office in 3-4 months after obtainment of both PSA and multiparametric MRI of the prostate     2  BPH with LUTS  -Symptoms are stable  Continue to monitor  Reevaluate at follow-up in 3 to 4 months        Chief Complaint:   Chandrika Sumner is a 69 y o  male presenting to the office for a follow up visit regarding Active surveillance for Zaheer 6/7 (3+4)prostate cancer    3 biopsies     Subjective    Patient is a 28-year-old male with a history of elevated PSA which prompted a transrectal ultrasound-guided biopsy in 2019 which was negative for malignancy but which did demonstrate atypical cells which prompted a multi parametric MRI when his PSA chantal to 7  That first multi parametric MRI in 1/2021 revealed a PI-RADS 5 lesion in the anterior peripheral zone of the prostate apex    These findings prompted MRI fusion biopsy performed in 2021 by Dr Simone Walker which revealed 3 cores of Zaheer 3+3 disease as well as 1 core of Zaheer 3+4 disease  At that time the patient opted to proceed with active surveillance versus definitive treatment for his prostate cancer  Genomic testing at that time revealed a 2% risk of 10-year disease specific mortality as well as a less than 1% chance of metastatic disease with primary treatment of 10 years  Repeat MRI of the prostate performed in April 2022 revealed no significant interval change in the previously seen lesion at the prostatic apex in the anterior peripheral zone extending into the anterior transition zone  Repeat MRI fusion prostate biopsy in June 2022 with all cores negative for malignancy but 1 core revealing high-grade prostatic intraepithelial neoplasia  Returns today for a 6-month follow-up with PSA  PSA today is 6 2 up from 5 9 that was measured in April 2022  Peak PSA per chart review was 6 75 in December 2020  He presents to the office today with no complaints  His urinary symptoms nocturia/weak urinary stream/hesitancy have remained the same per the patient and his AUA score has remained stable    He reports no changes to his health    AUA SYMPTOM SCORE    Flowsheet Row Most Recent Value   AUA SYMPTOM SCORE    How often have you had a sensation of not emptying your bladder completely after you finished urinating? 1 (P)     How often have you had to urinate again less than two hours after you finished urinating? 2 (P)     How often have you found you stopped and started again several times when you urinate? 1 (P)     How often have you found it difficult to postpone urination? 1 (P)     How often have you had a weak urinary stream? 4 (P)     How often have you had to push or strain to begin urination? 1 (P)     How many times did you most typically get up to urinate from the time you went to bed at night until the time you got up in the morning? 5 (P)     Quality of Life: If you were to spend the rest of your life with your urinary condition just the way it is now, how would you feel about that? 2 (P)     AUA SYMPTOM SCORE 15 (P)           ROS:   Review of Systems   Constitutional: Negative  Negative for chills, fatigue and fever  HENT: Negative  Eyes: Negative  Respiratory: Negative for shortness of breath  Cardiovascular: Negative for chest pain and palpitations  Gastrointestinal: Negative  Endocrine: Negative  Genitourinary: Negative for decreased urine volume, difficulty urinating, dysuria, flank pain, frequency, hematuria and urgency  Musculoskeletal: Negative  Allergic/Immunologic: Negative  Neurological: Negative  Psychiatric/Behavioral: Negative            Past Medical History  Past Medical History:   Diagnosis Date   • Arthritis 2018    Knee   • Basal cell carcinoma of nose     removed 07/2015   • BPH with elevated PSA 12/11/2018   • Cancer ([facility] [address] )    • Hypercholesterolemia    • Hyperlipidemia    • Mitral valve disorder    • Osteoarthritis    • Seasonal allergies     resolved 01/21/2015       Past Surgical History  Past Surgical History:   Procedure Laterality Date   • COLONOSCOPY     • DENTAL SURGERY      1970's   • KNEE SURGERY     • OTHER SURGICAL HISTORY      cerumen removal 1st 05/26/2011;   2nd:  08/02/2012   • [place] BX PROSTATE STRTCTC SATURATION SAMPLING IMG GID N/A 6/8/2022    Procedure: TRANSPERINEAL MRI FUSION BIOPSY PROSTATE;  Surgeon: Lauri Cruz MD;  [facility];  Service: Urology   • [place] PROSTATE NEEDLE BIOPSY ANY APPROACH N/A 4/6/2021    Procedure: TRANSPERINEAL MRI FUSION PROSTATE BIOPSY;  Surgeon: Chica Thomas MD;  [facility];  Service: Urology   • PROSTATE BIOPSY         Past Family History  Family History   Problem Relation Age of Onset   • Alcohol abuse Father         independently diagnosed    • Heart disease Father    • Cancer Father    • Hypertension Mother    • COPD Mother    • Diabetes type II Mother    • Hypertension Maternal Grandmother • Coronary artery disease Maternal Grandmother    • Diabetes Maternal Grandmother    • Lung cancer Maternal Grandmother    • Coronary artery disease Family    • Stroke Paternal Grandmother    • Lung cancer Maternal Grandfather        Past Social history  Social History     Socioeconomic History   • Marital status: /Civil Union     Spouse name: Not on file   • Number of children: Not on file   • Years of education: Not on file   • Highest education level: Not on file   Occupational History   • Occupation:    Tobacco Use   • Smoking status: Never   • Smokeless tobacco: Never   Vaping Use   • Vaping Use: Never used   Substance and Sexual Activity   • Alcohol use: Yes     Comment: Less than one drink per week   • Drug use: No   • Sexual activity: Not on file   Other Topics Concern   • Not on file   Social History Narrative    Advance direct of file     Caffeine use -  He admits to consuming caffeine via coffee; 2-3 cups per week; and tea 2 cups per day     Uses safety belts      Social Determinants of Health     Financial Resource Strain: Low Risk    • Difficulty of Paying Living Expenses: Not hard at all   Food Insecurity: Not on file   Transportation Needs: No Transportation Needs   • Lack of Transportation (Medical): No   • Lack of Transportation (Non-Medical):  No   Physical Activity: Not on file   Stress: Not on file   Social Connections: Not on file   Intimate Partner Violence: Not on file   Housing Stability: Not on file       Current Medications  Current Outpatient Medications   Medication Sig Dispense Refill   • Ascorbic Acid (VITAMIN C ER) 500 MG CPCR Take 1 capsule by mouth daily     • aspirin (ECOTRIN LOW STRENGTH) 81 mg EC tablet Take 81 mg by mouth daily     • co-enzyme Q-10 100 mg capsule Take 100 mg by mouth daily     • ezetimibe (ZETIA) 10 mg tablet Take 1 tablet (10 mg total) by mouth daily 90 tablet 1   • ketoconazole (NIZORAL) 2 % shampoo if needed     • Multiple Vitamins-Minerals (MULTIVITAMIN ADULT) TABS Take 1 tablet by mouth daily     • Omega-3 Fatty Acids (FISH OIL) 1200 MG CAPS Take 2 capsules by mouth daily      • Vitamin D, Cholecalciferol, 25 MCG (1000 UT) TABS in the morning       No current facility-administered medications for this visit  Allergies  No Known Allergies    OBJECTIVE    Vitals   Vitals:    01/03/23 1101   BP: 144/82   BP Location: Right arm   Patient Position: Sitting   Cuff Size: Standard   Pulse: 105   Resp: 18   SpO2: 96%   Weight: 83 9 kg (185 lb)   Height: 5' 10" (1 778 m)       Physical Exam  Constitutional:       Appearance: Normal appearance  He is normal weight  HENT:      Head: Normocephalic and atraumatic  Right Ear: External ear normal       Left Ear: External ear normal    Cardiovascular:      Rate and Rhythm: Normal rate  Pulmonary:      Effort: Pulmonary effort is normal  No respiratory distress  Genitourinary:     Comments: His prostate is enlarged  Stool noted in rectal vault  No prostatic tenderness  Prostate firm but no rockhard lesions or nodules noted on exam  Musculoskeletal:      Cervical back: Normal range of motion and neck supple  Skin:     General: Skin is warm and dry  Neurological:      General: No focal deficit present  Mental Status: He is alert  Cranial Nerves: No cranial nerve deficit  Psychiatric:         Mood and Affect: Mood normal          Behavior: Behavior normal          Thought Content:  Thought content normal          Labs:     Lab Results   Component Value Date    PSA 6 2 (H) 12/28/2022    PSA 5 9 (H) 04/25/2022    PSA 5 0 (H) 12/23/2021    PSA 5 4 (H) 04/20/2021     Lab Results   Component Value Date    CREATININE 0 96 03/29/2021      No results found for: HGBA1C  Lab Results   Component Value Date    CALCIUM 8 8 03/29/2021    K 4 1 03/29/2021    CO2 26 03/29/2021     03/29/2021    BUN 15 03/29/2021    CREATININE 0 96 03/29/2021       I have personally reviewed all pertinent lab results and reviewed with patient    Imaging     Multiparametric MRI of the prostate 4/14/2022 Impression    Known clinically significant prostate cancer  No significant interval change in previously seen 1 6 cm lesion at the prostate apex in the anterior peripheral zone extending into the anterior transition zone      2   Lesion broadly abuts the prostate capsule without visualized extraprostatic extension  No seminal vesicle invasion, pelvic lymphadenopathy, or pelvic osseous metastatic disease      3  Calculated prostate volume of 96 6 cc      Prostate gland boundaries and areas of concern for significant prostate cancer were segmented using 3D advanced post-processing on an independent [employer] system workstation with active physician participation  The segmentation was performed should   MR-ultrasound fusion biopsy be required    Charles Arboleda PA-C  Date: 1/3/2023 Time: 11:06 AM  [facility]    This note was written using fluency dictation software  Please excuse any resulting minor grammatical errors

## 2023-01-13 ENCOUNTER — HOSPITAL ENCOUNTER (OUTPATIENT)
Dept: CT IMAGING | Facility: HOSPITAL | Age: 78
Discharge: HOME | End: 2023-01-13
Attending: INTERNAL MEDICINE

## 2023-01-13 DIAGNOSIS — I10 BENIGN ESSENTIAL HTN: ICD-10-CM

## 2023-01-13 DIAGNOSIS — E78.00 HYPERCHOLESTEROLEMIA: ICD-10-CM

## 2023-01-16 ENCOUNTER — TELEPHONE (OUTPATIENT)
Dept: INTERNAL MEDICINE CLINIC | Facility: CLINIC | Age: 78
End: 2023-01-16

## 2023-01-16 NOTE — TELEPHONE ENCOUNTER
Significant findings found for CT coronary calcium score  Message sent to pcp via [facility] text

## 2023-01-31 DIAGNOSIS — K44.9 SLIDING HIATAL HERNIA: Primary | ICD-10-CM

## 2023-03-14 ENCOUNTER — OFFICE VISIT (OUTPATIENT)
Dept: GASTROENTEROLOGY | Facility: CLINIC | Age: 78
End: 2023-03-14

## 2023-03-14 VITALS
TEMPERATURE: 97.8 F | DIASTOLIC BLOOD PRESSURE: 81 MMHG | BODY MASS INDEX: 26.92 KG/M2 | SYSTOLIC BLOOD PRESSURE: 144 MMHG | WEIGHT: 188 LBS | HEIGHT: 70 IN

## 2023-03-14 DIAGNOSIS — K44.9 SLIDING HIATAL HERNIA: ICD-10-CM

## 2023-03-14 DIAGNOSIS — K22.89 ESOPHAGEAL THICKENING: Primary | ICD-10-CM

## 2023-03-14 NOTE — PROGRESS NOTES
[facility] - Outpatient Consultation  Palmira Barraza 69 y o  male MRN: [id number]  Encounter: [id number]          ASSESSMENT AND PLAN:      1  Esophageal thickening  EGD      2  Sliding hiatal hernia  Ambulatory Referral to Gastroenterology    EGD        Given imaging findings of small sliding hiatal hernia and thickening in the distal esophagus, will plan for upper endoscopy to evaluate    The rationale for endoscopy with possible biopsy, possible polypectomy, with IV sedation as well as all risks, benefits, and alternatives were discussed with the patient in detail  Risks including but not limited to perforation, bleeding, need for blood transfusion or emergent surgery, and missed neoplasm were reviewed in detail with the patient  The patient demonstrates full understanding and wishes to proceed  ______________________________________________________________    HPI:  Palmira Barraza is a 69y o  year old male who presents to the office for evaluation of hiatal hernia  Patient underwent CT coronary calcium score on 1/13/2023  He was noted to have small sliding hiatal hernia with questionable thickening of the distal esophagus with borderline 8 mm paraesophageal node present  He has not had upper endoscopy in the past     He does not have any reflux symptoms  He has had a sensation of oatmeal getting stuck in his chest and then passing spontaneously  The sensation of food feeling stuck lasts several seconds and then the food passes  This occurred only 2-3 times over the past 6 months  Only once has he had this occur during his dinner  REVIEW OF SYSTEMS:    CONSTITUTIONAL: Denies any fever, chills, rigors, and weight loss  HEENT: No earache or tinnitus  Denies hearing loss or visual disturbances  CARDIOVASCULAR: No chest pain or palpitations  RESPIRATORY: Denies any cough, hemoptysis, shortness of breath or dyspnea on exertion    GASTROINTESTINAL: As noted in the History of Present Illness  GENITOURINARY: No problems with urination  Denies any hematuria or dysuria  NEUROLOGIC: No dizziness or vertigo, denies headaches  MUSCULOSKELETAL: Denies any muscle or joint pain  SKIN: Denies skin rashes or itching  ENDOCRINE: Denies excessive thirst  Denies intolerance to heat or cold  PSYCHOSOCIAL: Denies depression or anxiety  Denies any recent memory loss         Historical Information   Past Medical History:   Diagnosis Date   • Arthritis 2018    Knee   • Basal cell carcinoma of nose     removed 07/2015   • BPH with elevated PSA 12/11/2018   • Cancer ([facility] [address] )    • Hypercholesterolemia    • Hyperlipidemia    • Mitral valve disorder    • Osteoarthritis    • Seasonal allergies     resolved 01/21/2015     Past Surgical History:   Procedure Laterality Date   • COLONOSCOPY     • DENTAL SURGERY      1970's   • KNEE SURGERY     • OTHER SURGICAL HISTORY      cerumen removal 1st 05/26/2011;   2nd:  08/02/2012   • [place] BX PROSTATE STRTCTC SATURATION SAMPLING IMG GID N/A 6/8/2022    Procedure: TRANSPERINEAL MRI FUSION BIOPSY PROSTATE;  Surgeon: Jumana Bautista MD;  [facility];  Service: Urology   • [place] PROSTATE NEEDLE BIOPSY ANY APPROACH N/A 4/6/2021    Procedure: TRANSPERINEAL MRI FUSION PROSTATE BIOPSY;  Surgeon: Ashley Barrett MD;  [facility];  Service: Urology   • PROSTATE BIOPSY       Social History   Social History     Substance and Sexual Activity   Alcohol Use Yes    Comment: Less than one drink per week     Social History     Substance and Sexual Activity   Drug Use No     Social History     Tobacco Use   Smoking Status Never   Smokeless Tobacco Never     Family History   Problem Relation Age of Onset   • Alcohol abuse Father         independently diagnosed    • Heart disease Father    • Cancer Father    • Hypertension Mother    • COPD Mother    • Diabetes type II Mother    • Hypertension Maternal Grandmother    • Coronary artery disease Maternal Grandmother    • Diabetes Maternal Grandmother    • Lung cancer Maternal Grandmother    • Coronary artery disease Family    • Stroke Paternal Grandmother    • Lung cancer Maternal Grandfather        Meds/Allergies       Current Outpatient Medications:   •  Ascorbic Acid (VITAMIN C ER) 500 MG CPCR  •  aspirin (ECOTRIN LOW STRENGTH) 81 mg EC tablet  •  co-enzyme Q-10 100 mg capsule  •  ezetimibe (ZETIA) 10 mg tablet  •  ketoconazole (NIZORAL) 2 % shampoo  •  Multiple Vitamins-Minerals (MULTIVITAMIN ADULT) TABS  •  Omega-3 Fatty Acids (FISH OIL) 1200 MG CAPS  •  Vitamin D, Cholecalciferol, 25 MCG (1000 UT) TABS    No Known Allergies        Objective     Blood pressure 144/81, temperature 97 8 °F (36 6 °C), temperature source Tympanic, height 5' 10" (1 778 m), weight 85 3 kg (188 lb)  Body mass index is 26 98 kg/m²  PHYSICAL EXAM:      General Appearance:   Alert, cooperative, no distress   HEENT:   Normocephalic, atraumatic, anicteric  Lungs:   Equal chest rise and unlabored breathing, normal cough   Heart:   No visualized JVD   Abdomen:   Soft, non-tender, non-distended; no masses, no organomegaly    Genitalia:   Deferred    Rectal:   Deferred    Extremities:  No cyanosis, clubbing or edema    Pulses:  Musculoskeletal:  2+ and symmetric  Normal range of motion visualized    Skin:  Neuro:  No jaundice, rashes, or lesions   Alert and appropriate       Lab Results:   No visits with results within 1 Day(s) from this visit  Latest known visit with results is:   Office Visit on 01/03/2023   Component Date Value   • POST-VOID RESIDUAL VOLUM* 01/03/2023 0          Radiology Results:   No results found      Answers for HPI/ROS submitted by the patient on 3/13/2023  Diagnostic workup: CT scan

## 2023-03-14 NOTE — PATIENT INSTRUCTIONS
Scheduled date of EGD(as of today):05/11/2023  Physician performing EGD:Gary  Location of EGD:[place]  Instructions reviewed with patient by: Melissa Hartman  Clearances:  N/A

## 2023-04-04 ENCOUNTER — APPOINTMENT (OUTPATIENT)
Dept: LAB | Facility: CLINIC | Age: 78
End: 2023-04-04

## 2023-04-04 DIAGNOSIS — C61 PROSTATE CANCER (HCC): ICD-10-CM

## 2023-04-04 LAB
ANION GAP SERPL CALCULATED.3IONS-SCNC: 2 MMOL/L (ref 4–13)
BUN SERPL-MCNC: 15 MG/DL (ref 5–25)
CALCIUM SERPL-MCNC: 8.9 MG/DL (ref 8.3–10.1)
CHLORIDE SERPL-SCNC: 105 MMOL/L (ref 96–108)
CO2 SERPL-SCNC: 30 MMOL/L (ref 21–32)
CREAT SERPL-MCNC: 0.95 MG/DL (ref 0.6–1.3)
GFR SERPL CREATININE-BSD FRML MDRD: 77 ML/MIN/1.73SQ M
GLUCOSE P FAST SERPL-MCNC: 94 MG/DL (ref 65–99)
POTASSIUM SERPL-SCNC: 4 MMOL/L (ref 3.5–5.3)
PSA SERPL-MCNC: 5.7 NG/ML (ref 0–4)
SODIUM SERPL-SCNC: 137 MMOL/L (ref 135–147)

## 2023-04-05 ENCOUNTER — TELEPHONE (OUTPATIENT)
Dept: UROLOGY | Facility: AMBULATORY SURGERY CENTER | Age: 78
End: 2023-04-05

## 2023-04-05 NOTE — TELEPHONE ENCOUNTER
----- Message from Alysia Christianson PA-C sent at 4/5/2023  8:12 AM EDT -----  Please call patient to inform him that his PSA resulted at 5 7 which is down from 6 2 measured 3 months ago  Still proceed with obtainment of MRI of the prostate for active surveillance and to ensure that there is no progression seen on imaging and to follow-up as planned on 4/24 in the office  Spoke with pt and advised him of results as stated above  Pt in agreement to have MRI done as scheduled and f/u with Portia Batista as scheduled in office  Pt had no further questions

## 2023-04-24 ENCOUNTER — OFFICE VISIT (OUTPATIENT)
Dept: UROLOGY | Facility: AMBULATORY SURGERY CENTER | Age: 78
End: 2023-04-24

## 2023-04-24 VITALS
BODY MASS INDEX: 26.92 KG/M2 | OXYGEN SATURATION: 97 % | DIASTOLIC BLOOD PRESSURE: 70 MMHG | HEIGHT: 70 IN | SYSTOLIC BLOOD PRESSURE: 152 MMHG | WEIGHT: 188 LBS

## 2023-04-24 DIAGNOSIS — C61 PROSTATE CANCER (HCC): Primary | ICD-10-CM

## 2023-04-24 NOTE — PROGRESS NOTES
4/24/2023    Louisa Barker  1945  [id number]      Assessment  -Strabane 6 & 7 prostate cancer on active surveillance  -BPH with lower urinary tract symptoms    Discussion/Plan  Moncho Wei is a 69 y o  male being managed by Dr Felix Meehan  1  Strabane 6 & 7 prostate cancer on active surveillance-we discussed results of his recent PSA which is 5 7, previously 6 2  Multiparametric MRI of prostate revealed again PI-RADS 5  There is a stable, unchanged, 1 6 cm lesion in anterior peripheral zone prostate apex extending to transitional zone  The lesion broadly abuts the capsule without extraprostatic extension or seminal vesicle invasion  Prostate measured 92 cc  Findings are similar compared to prior mpMRIs  Based on stability of results, and decreased PSA, we discussed continued observation with repeat PSA versus undergoing a third prostate biopsy  Recent LEBRON unremarkable  Patient denies any urinary complaints  Follow up in 6 months with PSA and LEBRON  He was advised to call sooner with any questions or issues     -All questions answered, patient agrees with plan      History of Present Illness  69 y o  male with a history of Gl 6/7 prostate cancer and BPH presents today for follow up  Patient was last seen in the office in January 2023  He has a history of elevated PSA which prompted a TRUS prostate biopsy in 2019 which identified atypical cells, negative for malignancy  His PSA increased to 7 which prompted a multiparametric MRI of prostate in January 2021  Findings revealed PI-RADS 5  He underwent MRI fusion prostate biopsy which identified 3 cores of Zaheer 3+3 equal 6 disease and 1 core of Strabane 3+4 equal 7 disease  Patient had opted to proceed with active surveillance  Repeat multiparametric MRI of prostate performed in April 2022 showed no significant changes in prior lesions    He underwent another MRI fusion prostate biopsy in June 2022 showing all cores negative for malignancy except 1 core revealing HGPIN  His last PSA was 6 2  Patient denies any strong family history of prostate malignancy  Prior Prolaris testing revealed 2% risk of 10-year disease specific mortality and less than 1% chance of metastatic disease  Patient denies any lower urinary tract symptoms, gross hematuria, or dysuria  No changes to his overall health     4/2023  mpMRI prostate  6/2022  MRI fusion prostate biopsy  4/2022  mpMRI prostate  4/2021  MRI fusion prostate biopsy  1/2021  mpMRI prostate  8/2019  Initial TRUS prostate biopsy      Review of Systems  Review of Systems   Constitutional: Negative  HENT: Negative  Respiratory: Negative  Cardiovascular: Negative  Gastrointestinal: Negative  Genitourinary: Negative for decreased urine volume, difficulty urinating, dysuria, flank pain, frequency, hematuria and urgency  Musculoskeletal: Negative  Skin: Negative  Neurological: Negative  Psychiatric/Behavioral: Negative        AUA SYMPTOM SCORE    Flowsheet Row Most Recent Value   AUA SYMPTOM SCORE    How often have you had a sensation of not emptying your bladder completely after you finished urinating? 1 (P)     How often have you had to urinate again less than two hours after you finished urinating? 2 (P)     How often have you found you stopped and started again several times when you urinate? 1 (P)     How often have you found it difficult to postpone urination? 1 (P)     How often have you had a weak urinary stream? 4 (P)     How often have you had to push or strain to begin urination? 0 (P)     How many times did you most typically get up to urinate from the time you went to bed at night until the time you got up in the morning? 5 (P)     Quality of Life: If you were to spend the rest of your life with your urinary condition just the way it is now, how would you feel about that? 2 (P)     AUA SYMPTOM SCORE 14 (P)           Past Medical History  Past Medical History:   Diagnosis Date   • Arthritis 2018    Knee   • Basal cell carcinoma of nose     removed 07/2015   • BPH with elevated PSA 12/11/2018   • Cancer ([facility] [address] )    • Hypercholesterolemia    • Hyperlipidemia    • Mitral valve disorder    • Osteoarthritis    • Seasonal allergies     resolved 01/21/2015       Past Social History  Past Surgical History:   Procedure Laterality Date   • COLONOSCOPY     • DENTAL SURGERY      1970's   • KNEE SURGERY     • OTHER SURGICAL HISTORY      cerumen removal 1st 05/26/2011;   2nd:  08/02/2012   • OR BX PROSTATE STRTCTC SATURATION SAMPLING IMG GID N/A 6/8/2022    Procedure: TRANSPERINEAL MRI FUSION BIOPSY PROSTATE;  Surgeon: Don Garcia MD;  Location: [facility];  Service: Urology   • OR PROSTATE NEEDLE BIOPSY ANY APPROACH N/A 4/6/2021    Procedure: TRANSPERINEAL MRI FUSION PROSTATE BIOPSY;  Surgeon: José Antonio Palafox MD;  Location: [facility];  Service: Urology   • PROSTATE BIOPSY         Past Family History  Family History   Problem Relation Age of Onset   • Alcohol abuse Father         independently diagnosed    • Heart disease Father    • Cancer Father    • Hypertension Mother    • COPD Mother    • Diabetes type II Mother    • Hypertension Maternal Grandmother    • Coronary artery disease Maternal Grandmother    • Diabetes Maternal Grandmother    • Lung cancer Maternal Grandmother    • Coronary artery disease Family    • Stroke Paternal Grandmother    • Lung cancer Maternal Grandfather        Past Social history  Social History     Socioeconomic History   • Marital status: /Civil Union     Spouse name: Not on file   • Number of children: Not on file   • Years of education: Not on file   • Highest education level: Not on file   Occupational History   • Occupation:    Tobacco Use   • Smoking status: Never   • Smokeless tobacco: Never   Vaping Use   • Vaping Use: Never used   Substance and Sexual Activity   • Alcohol use: Yes     Comment: Less than one drink per week   • Drug use: No   • Sexual activity: Not on file   Other Topics Concern   • Not on file   Social History Narrative    Advance direct of file     Caffeine use -  He admits to consuming caffeine via coffee; 2-3 cups per week; and tea 2 cups per day     Uses safety belts      Social Determinants of Health     Financial Resource Strain: Low Risk    • Difficulty of Paying Living Expenses: Not hard at all   Food Insecurity: Not on file   Transportation Needs: No Transportation Needs   • Lack of Transportation (Medical): No   • Lack of Transportation (Non-Medical): No   Physical Activity: Not on file   Stress: Not on file   Social Connections: Not on file   Intimate Partner Violence: Not on file   Housing Stability: Not on file       Current Medications  Current Outpatient Medications   Medication Sig Dispense Refill   • Ascorbic Acid (VITAMIN C ER) 500 MG CPCR Take 1 capsule by mouth daily     • aspirin (ECOTRIN LOW STRENGTH) 81 mg EC tablet Take 81 mg by mouth daily     • co-enzyme Q-10 100 mg capsule Take 100 mg by mouth daily     • ezetimibe (ZETIA) 10 mg tablet Take 1 tablet (10 mg total) by mouth daily 90 tablet 1   • ketoconazole (NIZORAL) 2 % shampoo if needed     • Multiple Vitamins-Minerals (MULTIVITAMIN ADULT) TABS Take 1 tablet by mouth daily     • Omega-3 Fatty Acids (FISH OIL) 1200 MG CAPS Take 2 capsules by mouth daily      • Vitamin D, Cholecalciferol, 25 MCG (1000 UT) TABS in the morning       No current facility-administered medications for this visit  Allergies  No Known Allergies    Past Medical History, Social History, Family History, medications and allergies were reviewed  Vitals  There were no vitals filed for this visit  Physical Exam  Physical Exam  Constitutional:       Appearance: Normal appearance  He is well-developed  HENT:      Head: Normocephalic  Eyes:      Pupils: Pupils are equal, round, and reactive to light     Pulmonary:      Effort: Pulmonary effort is normal    Abdominal:      Palpations: Abdomen is soft  Musculoskeletal:         General: Normal range of motion  Cervical back: Normal range of motion  Skin:     General: Skin is warm and dry  Neurological:      General: No focal deficit present  Mental Status: He is alert and oriented to person, place, and time  Psychiatric:         Mood and Affect: Mood normal          Behavior: Behavior normal          Thought Content: Thought content normal          Judgment: Judgment normal          Results    I have personally reviewed all pertinent lab results and reviewed with patient  Lab Results   Component Value Date    PSA 5 7 (H) 04/04/2023    PSA 6 2 (H) 12/28/2022    PSA 5 9 (H) 04/25/2022     Lab Results   Component Value Date    CALCIUM 8 9 04/04/2023    K 4 0 04/04/2023    CO2 30 04/04/2023     04/04/2023    BUN 15 04/04/2023    CREATININE 0 95 04/04/2023     Lab Results   Component Value Date    WBC 4 26 (L) 03/29/2021    HGB 15 4 03/29/2021    HCT 46 4 03/29/2021    MCV 96 03/29/2021     03/29/2021     No results found for this or any previous visit (from the past 1 hour(s))

## 2023-05-11 ENCOUNTER — ANESTHESIA EVENT (OUTPATIENT)
Dept: GASTROENTEROLOGY | Facility: AMBULARY SURGERY CENTER | Age: 78
End: 2023-05-11

## 2023-05-11 ENCOUNTER — ANESTHESIA (OUTPATIENT)
Dept: GASTROENTEROLOGY | Facility: AMBULARY SURGERY CENTER | Age: 78
End: 2023-05-11

## 2023-05-11 ENCOUNTER — HOSPITAL ENCOUNTER (OUTPATIENT)
Dept: GASTROENTEROLOGY | Facility: AMBULARY SURGERY CENTER | Age: 78
Setting detail: OUTPATIENT SURGERY
Discharge: HOME | End: 2023-05-11
Attending: INTERNAL MEDICINE

## 2023-05-11 VITALS
RESPIRATION RATE: 18 BRPM | SYSTOLIC BLOOD PRESSURE: 100 MMHG | OXYGEN SATURATION: 98 % | HEIGHT: 70 IN | DIASTOLIC BLOOD PRESSURE: 66 MMHG | TEMPERATURE: 98.2 F | WEIGHT: 186 LBS | HEART RATE: 68 BPM | BODY MASS INDEX: 26.63 KG/M2

## 2023-05-11 DIAGNOSIS — K21.9 GASTROESOPHAGEAL REFLUX DISEASE WITHOUT ESOPHAGITIS: Primary | ICD-10-CM

## 2023-05-11 DIAGNOSIS — K44.9 SLIDING HIATAL HERNIA: ICD-10-CM

## 2023-05-11 DIAGNOSIS — K22.89 ESOPHAGEAL THICKENING: ICD-10-CM

## 2023-05-11 RX ORDER — SODIUM CHLORIDE, SODIUM LACTATE, POTASSIUM CHLORIDE, CALCIUM CHLORIDE 600; 310; 30; 20 MG/100ML; MG/100ML; MG/100ML; MG/100ML
INJECTION, SOLUTION INTRAVENOUS CONTINUOUS PRN
Status: DISCONTINUED | OUTPATIENT
Start: 2023-05-11 | End: 2023-05-11

## 2023-05-11 RX ORDER — OMEPRAZOLE 40 MG/1
40 CAPSULE, DELAYED RELEASE ORAL DAILY
Qty: 60 CAPSULE | Refills: 1 | Status: SHIPPED | OUTPATIENT
Start: 2023-05-11

## 2023-05-11 RX ORDER — LIDOCAINE HYDROCHLORIDE 20 MG/ML
INJECTION, SOLUTION EPIDURAL; INFILTRATION; INTRACAUDAL; PERINEURAL AS NEEDED
Status: DISCONTINUED | OUTPATIENT
Start: 2023-05-11 | End: 2023-05-11

## 2023-05-11 RX ORDER — PROPOFOL 10 MG/ML
INJECTION, EMULSION INTRAVENOUS AS NEEDED
Status: DISCONTINUED | OUTPATIENT
Start: 2023-05-11 | End: 2023-05-11

## 2023-05-11 RX ADMIN — PROPOFOL 150 MG: 10 INJECTION, EMULSION INTRAVENOUS at 11:05

## 2023-05-11 RX ADMIN — LIDOCAINE HYDROCHLORIDE 100 MG: 20 INJECTION, SOLUTION EPIDURAL; INFILTRATION; INTRACAUDAL at 11:05

## 2023-05-11 RX ADMIN — PROPOFOL 50 MG: 10 INJECTION, EMULSION INTRAVENOUS at 11:08

## 2023-05-11 RX ADMIN — PROPOFOL 50 MG: 10 INJECTION, EMULSION INTRAVENOUS at 11:06

## 2023-05-11 RX ADMIN — SODIUM CHLORIDE, SODIUM LACTATE, POTASSIUM CHLORIDE, AND CALCIUM CHLORIDE: .6; .31; .03; .02 INJECTION, SOLUTION INTRAVENOUS at 09:23

## 2023-05-11 RX ADMIN — PROPOFOL 50 MG: 10 INJECTION, EMULSION INTRAVENOUS at 11:10

## 2023-05-11 RX ADMIN — PROPOFOL 50 MG: 10 INJECTION, EMULSION INTRAVENOUS at 11:07

## 2023-05-11 NOTE — ANESTHESIA POSTPROCEDURE EVALUATION
Post-Op Assessment Note    CV Status:  Stable  Pain Score: 0    Pain management: adequate     Mental Status:  Arousable   Hydration Status:  Stable   PONV Controlled:  None   Airway Patency:  Patent      Post Op Vitals Reviewed: Yes      Staff: CRNA         No notable events documented      /70   Temp   97   Pulse  88   Resp   18   SpO2   93

## 2023-05-11 NOTE — ANESTHESIA PREPROCEDURE EVALUATION
Procedure:  EGD    Relevant Problems   CARDIO   (+) Benign essential HTN   (+) Hypercholesterolemia      GI/HEPATIC   (+) Gastroesophageal reflux disease without esophagitis      /RENAL   (+) Prostate cancer (HCC)      MUSCULOSKELETAL   (+) Primary osteoarthritis of left knee        Physical Exam    Airway    Mallampati score: II         Dental       Cardiovascular  Cardiovascular exam normal    Pulmonary  Pulmonary exam normal     Other Findings        Anesthesia Plan  ASA Score- 2     Anesthesia Type- IV sedation with anesthesia with ASA Monitors  Additional Monitors:   Airway Plan:           Plan Factors-Exercise tolerance (METS): >4 METS  Chart reviewed  EKG reviewed  Imaging results reviewed  Existing labs reviewed  Patient summary reviewed  Patient is not a current smoker  Patient not instructed to abstain from smoking on day of procedure  Induction- intravenous  Postoperative Plan-     Informed Consent- Anesthetic plan and risks discussed with patient

## 2023-05-12 ENCOUNTER — NURSE TRIAGE (OUTPATIENT)
Dept: OTHER | Facility: OTHER | Age: 78
End: 2023-05-12

## 2023-05-12 NOTE — TELEPHONE ENCOUNTER
"Patient reports yesterday afternoon s/p EGD he experienced a fever, body aches, and chills  His fever broke and he is feeling better today but would like to make sure that is normal  He would also like to verify that he is supposed to take Prilosec for 2 weeks, continuing if it helps his symptoms and to stop taking if it does not  Reason for Disposition  • Caller has NON-URGENT question and triager unable to answer question    Answer Assessment - Initial Assessment Questions  1  SYMPTOM: \"What's the main symptom you're concerned about? \" (e g , pain, fever, vomiting)      Fever, body aches, chills   2  ONSET: \"When did they start? \"      5/11/23  3  SURGERY: \"What surgery was performed? \"      EGD  4  DATE of SURGERY: \"When was surgery performed? \"       5/11/23  5  ANESTHESIA: \" What type of anesthesia did you have? \" (e g , general, spinal, epidural, local)      IV sedation   6  PAIN: \"Is there any pain? \" If Yes, ask: \"How bad is it? \"  (Scale 1-10; or mild, moderate, severe)      Mild   7  FEVER: \"Do you have a fever? \" If Yes, ask: \"What is your temperature, how was it measured, and when did it start? \"      Resolved, 100   8  VOMITING: \"Is there any vomiting? \" If yes, ask: \"How many times? \"      Denies   9  BLEEDING: \"Is there any bleeding? \" If Yes, ask: \"How much? \" and \"Where? \"      Denies   10  OTHER SYMPTOMS: \"Do you have any other symptoms? \" (e g , drainage from wound, painful urination, constipation)        Denies    Protocols used: POST-OP SYMPTOMS AND QUESTIONS-ADULT-[place]    "

## 2023-05-12 NOTE — TELEPHONE ENCOUNTER
"Regarding: Achy chill fever post procedure  ----- Message from Lisseth Pickard sent at 5/12/2023 11:00 AM EDT -----  \"Yesterday after my procedure everything was fine  Then in the afternoon I was achy with chills, and a low grade fever around 100  My fever broke during the night   Is that normal?\"    "

## 2023-06-08 ENCOUNTER — RA CDI HCC (OUTPATIENT)
Dept: OTHER | Facility: HOSPITAL | Age: 78
End: 2023-06-08

## 2023-06-14 ENCOUNTER — OFFICE VISIT (OUTPATIENT)
Dept: INTERNAL MEDICINE CLINIC | Facility: CLINIC | Age: 78
End: 2023-06-14
Payer: MEDICARE

## 2023-06-14 VITALS
BODY MASS INDEX: 26.86 KG/M2 | WEIGHT: 187.6 LBS | HEIGHT: 70 IN | OXYGEN SATURATION: 98 % | HEART RATE: 86 BPM | DIASTOLIC BLOOD PRESSURE: 70 MMHG | TEMPERATURE: 98 F | SYSTOLIC BLOOD PRESSURE: 138 MMHG

## 2023-06-14 DIAGNOSIS — M17.12 PRIMARY OSTEOARTHRITIS OF LEFT KNEE: ICD-10-CM

## 2023-06-14 DIAGNOSIS — E78.00 HYPERCHOLESTEROLEMIA: ICD-10-CM

## 2023-06-14 DIAGNOSIS — I10 BENIGN ESSENTIAL HTN: Primary | ICD-10-CM

## 2023-06-14 DIAGNOSIS — K44.9 SLIDING HIATAL HERNIA: ICD-10-CM

## 2023-06-14 DIAGNOSIS — C61 PROSTATE CANCER (HCC): ICD-10-CM

## 2023-06-14 PROCEDURE — 99214 OFFICE O/P EST MOD 30 MIN: CPT | Performed by: INTERNAL MEDICINE

## 2023-06-14 RX ORDER — EZETIMIBE 10 MG/1
10 TABLET ORAL DAILY
Qty: 90 TABLET | Refills: 1 | Status: SHIPPED | OUTPATIENT
Start: 2023-06-14 | End: 2023-12-11

## 2023-06-14 NOTE — PROGRESS NOTES
Name: Liliam Bowen      : 1945      MRN: [id number]  Encounter Provider: Aniket Bejarano MD  Encounter Date: 2023   Encounter department: [address] [place] [address]     1  Benign essential HTN  Assessment & Plan:  Pressure is nicely controlled without medication  Orders:  -     CBC and differential; Future; Expected date: 2023  -     Comprehensive metabolic panel; Future; Expected date: 2023    2  Hypercholesterolemia  Assessment & Plan:  Lipid profile is near goal levels  Orders:  -     ezetimibe (ZETIA) 10 mg tablet; Take 1 tablet (10 mg total) by mouth daily  -     CBC and differential; Future; Expected date: 2023  -     Comprehensive metabolic panel; Future; Expected date: 2023  -     Lipid panel; Future; Expected date: 2023    3  Prostate cancer [facility])  Assessment & Plan:  Continues with annual surveillance      4  Sliding hiatal hernia  Assessment & Plan:  Asymptomatic  This was discovered during imaging for coronary calcium scoring      5  Primary osteoarthritis of left knee  Assessment & Plan:  Referred to  orthopedic surgery    Orders:  -     Ambulatory Referral to Orthopedic Surgery; Future         Subjective      Patient presents for follow-up visit  In general he is doing well  Lab results were reviewed and are quite satisfactory  His main issue at this time is that of his left knee arthritis the development of a valgus deformity of his left knee  Otherwise he has no major complaints at this time    Review of Systems   Constitutional: Negative  Negative for activity change, appetite change, chills, diaphoresis, fatigue, fever and unexpected weight change  HENT: Negative  Eyes: Negative  Respiratory: Negative  Cardiovascular: Negative  Gastrointestinal: Negative  Endocrine: Negative  Genitourinary: Negative  Musculoskeletal: Positive for arthralgias (Left knee pain)     Skin: "Negative  Neurological: Negative  Hematological: Negative  Psychiatric/Behavioral: The patient is not nervous/anxious  Current Outpatient Medications on File Prior to Visit   Medication Sig   • Ascorbic Acid (VITAMIN C ER) 500 MG CPCR Take 1 capsule by mouth daily   • aspirin (ECOTRIN LOW STRENGTH) 81 mg EC tablet Take 81 mg by mouth daily   • co-enzyme Q-10 100 mg capsule Take 100 mg by mouth daily   • ketoconazole (NIZORAL) 2 % shampoo if needed   • Multiple Vitamins-Minerals (MULTIVITAMIN ADULT) TABS Take 1 tablet by mouth daily   • Omega-3 Fatty Acids (FISH OIL) 1200 MG CAPS Take 2 capsules by mouth daily    • Vitamin D, Cholecalciferol, 25 MCG (1000 UT) TABS in the morning   • [DISCONTINUED] ezetimibe (ZETIA) 10 mg tablet Take 1 tablet (10 mg total) by mouth daily   • [DISCONTINUED] omeprazole (PriLOSEC) 40 MG capsule Take 1 capsule (40 mg total) by mouth daily       Objective     /70 (BP Location: Left arm, Patient Position: Sitting, Cuff Size: Standard)   Pulse 86   Temp 98 °F (36 7 °C) (Tympanic)   Ht 5' 10\" (1 778 m)   Wt 85 1 kg (187 lb 9 6 oz)   SpO2 98%   BMI 26 92 kg/m²     Physical Exam  Vitals reviewed  Constitutional:       General: He is not in acute distress  Appearance: Normal appearance  He is normal weight  He is not ill-appearing, toxic-appearing or diaphoretic  HENT:      Head: Normocephalic and atraumatic  Right Ear: External ear normal       Left Ear: External ear normal    Eyes:      General: No scleral icterus  Conjunctiva/sclera: Conjunctivae normal       Pupils: Pupils are equal, round, and reactive to light  Neck:      Vascular: No carotid bruit or JVD  Trachea: No tracheal deviation  Cardiovascular:      Rate and Rhythm: Normal rate and regular rhythm  Pulses: Normal pulses  Heart sounds: Normal heart sounds  No murmur heard  Pulmonary:      Effort: Pulmonary effort is normal  No respiratory distress     Abdominal:      " General: Abdomen is flat  There is no distension  Musculoskeletal:         General: Deformity (Valgus deformity of the left knee) present  No swelling  Cervical back: Neck supple  Right lower leg: No edema  Left lower leg: No edema  Skin:     Coloration: Skin is not jaundiced  Findings: No bruising, erythema or rash  Neurological:      General: No focal deficit present  Mental Status: He is alert and oriented to person, place, and time  Mental status is at baseline     Psychiatric:         Mood and Affect: Mood normal          Behavior: Behavior normal        Fabiana Burrell MD

## 2023-06-21 ENCOUNTER — OFFICE VISIT (OUTPATIENT)
Dept: INTERNAL MEDICINE CLINIC | Facility: OTHER | Age: 78
End: 2023-06-21
Payer: MEDICARE

## 2023-06-21 VITALS
HEART RATE: 98 BPM | RESPIRATION RATE: 18 BRPM | WEIGHT: 187 LBS | DIASTOLIC BLOOD PRESSURE: 80 MMHG | BODY MASS INDEX: 26.77 KG/M2 | SYSTOLIC BLOOD PRESSURE: 136 MMHG | HEIGHT: 70 IN | OXYGEN SATURATION: 96 % | TEMPERATURE: 99 F

## 2023-06-21 DIAGNOSIS — L30.9 DERMATITIS: Primary | ICD-10-CM

## 2023-06-21 PROCEDURE — 99213 OFFICE O/P EST LOW 20 MIN: CPT | Performed by: INTERNAL MEDICINE

## 2023-06-21 NOTE — PROGRESS NOTES
Assessment/Plan:    Dermatitis  Healing  continue PRN benadryl cream         Diagnoses and all orders for this visit:    Dermatitis                  Subjective:      Patient ID: Markus Palacios is a 69 y o  male  Chief Complaint   Patient presents with   • Rash     Right leg  Noticed small dots last Wednesday but then they spread  No pain just a slight itch   • hm     Bmi f/u plan       69year old male is seen today with concern for a rash on his right leg  He first noticed the rash last week after working in the yard  He has been applying Neosporin and a Benadryl cream   The rash has been gradually improving  Rash  This is a new problem  The current episode started in the past 7 days  The affected locations include the right lower leg  The problem is mild  Pertinent negatives include no congestion, cough, diarrhea, fatigue, fever, rhinorrhea, shortness of breath, sore throat or vomiting  The following portions of the patient's history were reviewed and updated as appropriate: allergies, current medications, past family history, past medical history, past social history, past surgical history and problem list     Review of Systems   Constitutional: Negative for activity change, appetite change, chills, diaphoresis, fatigue and fever  HENT: Negative for congestion, postnasal drip, rhinorrhea, sinus pressure, sinus pain, sneezing and sore throat  Eyes: Negative for visual disturbance  Respiratory: Negative for apnea, cough, choking, chest tightness, shortness of breath and wheezing  Cardiovascular: Negative for chest pain, palpitations and leg swelling  Gastrointestinal: Negative for abdominal distention, abdominal pain, anal bleeding, blood in stool, constipation, diarrhea, nausea and vomiting  Endocrine: Negative for cold intolerance and heat intolerance  Genitourinary: Negative for difficulty urinating, dysuria and hematuria  Musculoskeletal: Negative  Skin: Positive for rash  Neurological: Negative for dizziness, weakness, light-headedness, numbness and headaches  Hematological: Negative for adenopathy  Psychiatric/Behavioral: Negative for agitation, sleep disturbance and suicidal ideas  All other systems reviewed and are negative          Past Medical History:   Diagnosis Date   • Allergic Spring pollens - years ago   • Arthritis 2018    Knee   • Basal cell carcinoma of nose     removed 07/2015   • BPH with elevated PSA 12/11/2018   • Cancer ([facility] [address] )    • Hypercholesterolemia    • Hyperlipidemia    • Mitral valve disorder    • Osteoarthritis    • Seasonal allergies     resolved 01/21/2015         Current Outpatient Medications:   •  Ascorbic Acid (VITAMIN C ER) 500 MG CPCR, Take 1 capsule by mouth daily, Disp: , Rfl:   •  aspirin (ECOTRIN LOW STRENGTH) 81 mg EC tablet, Take 81 mg by mouth daily, Disp: , Rfl:   •  co-enzyme Q-10 100 mg capsule, Take 100 mg by mouth daily, Disp: , Rfl:   •  ezetimibe (ZETIA) 10 mg tablet, Take 1 tablet (10 mg total) by mouth daily, Disp: 90 tablet, Rfl: 1  •  ketoconazole (NIZORAL) 2 % shampoo, if needed, Disp: , Rfl:   •  Multiple Vitamins-Minerals (MULTIVITAMIN ADULT) TABS, Take 1 tablet by mouth daily, Disp: , Rfl:   •  Omega-3 Fatty Acids (FISH OIL) 1200 MG CAPS, Take 2 capsules by mouth daily , Disp: , Rfl:   •  Vitamin D, Cholecalciferol, 25 MCG (1000 UT) TABS, in the morning, Disp: , Rfl:     No Known Allergies    Social History   Past Surgical History:   Procedure Laterality Date   • COLONOSCOPY     • DENTAL SURGERY      1970's   • KNEE SURGERY     • OTHER SURGICAL HISTORY      cerumen removal 1st 05/26/2011;   2nd:  08/02/2012   • OR BX PROSTATE STRTCTC SATURATION SAMPLING IMG GID N/A 6/8/2022    Procedure: TRANSPERINEAL MRI FUSION BIOPSY PROSTATE;  Surgeon: Adarsh Goodman MD;  Location: [facility] MAIN OR;  Service: Urology   • OR PROSTATE NEEDLE BIOPSY ANY APPROACH N/A 4/6/2021    Procedure: TRANSPERINEAL MRI FUSION PROSTATE BIOPSY; "Surgeon: Christy Garcia MD;  Location: [facility];  Service: Urology   • PROSTATE BIOPSY       Family History   Problem Relation Age of Onset   • Alcohol abuse Father         independently diagnosed    • Heart disease Father    • Cancer Father    • Hypertension Mother    • COPD Mother    • Diabetes type II Mother    • Hypertension Maternal Grandmother    • Coronary artery disease Maternal Grandmother    • Diabetes Maternal Grandmother    • Lung cancer Maternal Grandmother    • Coronary artery disease Family    • Stroke Paternal Grandmother    • Lung cancer Maternal Grandfather        Objective:  /80 (BP Location: Left arm, Patient Position: Sitting, Cuff Size: Standard)   Pulse 98   Temp 99 °F (37 2 °C) (Temporal)   Resp 18   Ht 5' 10\" (1 778 m)   Wt 84 8 kg (187 lb)   SpO2 96%   BMI 26 83 kg/m²     Recent Results (from the past 1344 hour(s))   Tissue Exam    Collection Time: 05/11/23 11:07 AM   Result Value Ref Range    Case Report       Surgical Pathology Report                         Case: [id number]                                   Authorizing Provider:  Anita Newton MD         Collected:           05/11/2023 1107              Ordering Location:     [facility]        Received:            05/11/2023 [address] [facility]                                     [facility]                                                    Pathologist:           Nancy Moya MD                                                     Specimens:   A) - Polyp, Esophageal, cold polypectomy: esophogeal polyp                                          B) - Stomach, cold bx: gastric                                                                      C) - Esophagogastric junction, cold bx: ge junction                                        Final Diagnosis       A  Polyp, Esophageal, cold polypectomy: esophogeal polyp:   -  Polypoid fragments of reactive squamous mucosa     -  No increase in intraepithelial eosinophils is " "seen    -  Negative for dysplasia or malignancy  B  Stomach, cold bx: gastric:  -  Chronic inactive antral gastritis  -  Negative for Helicobacter pylori, by H&E stain   -  Negative for intestinal metaplasia, dysplasia or carcinoma  C  Esophagogastric junction, cold bx: ge junction:  - Squamocolumnar junction mucosa with reactive changes suggestive of reflux esophagitis  - Gastric cardia type mucosa with chronic inactive gastritis  - Negative for goblet cell intestinal metaplasia  - No epithelial dysplasia and no evidence of malignancy  Note       The endoscopic report was reviewed  Additional Information       All reported additional testing was performed with appropriately reactive controls  These tests were developed and their performance characteristics determined by [facility] or appropriate performing facility, though some tests may be performed on tissues which have not been validated for performance characteristics (such as staining performed on alcohol exposed cell blocks and decalcified tissues)  Results should be interpreted with caution and in the context of the patients’ clinical condition  These tests may not be cleared or approved by the [place]  Food and Drug Administration, though the FDA has determined that such clearance or approval is not necessary  These tests are used for clinical purposes and they should not be regarded as investigational or for research  This laboratory has been approved by CLIA 88, designated as a high-complexity laboratory and is qualified to perform these tests  Interpretation performed at [place] [id number]       Gross Description       A  The specimen is received in formalin, labeled with the patient's name and hospital number, and is designated \" esophageal polyp\"  The specimen consists of 2 colorless soft tissue fragments measuring 0 3 and 0 4 cm  Entirely submitted   One screened " "cassette  B  The specimen is received in formalin, labeled with the patient's name and hospital number, and is designated \" stomach gastric\"  The specimen consists of 1 tan soft tissue fragment measuring 0 5 cm  Entirely submitted  One screened cassette  C  The specimen is received in formalin, labeled with the patient's name and hospital number, and is designated \" GE junction\"  The specimen consists of 3 tan soft tissue fragments measuring in range from 0 2 to 0 3 cm  Entirely submitted  One screened cassette  Note: The estimated total formalin fixation time based upon information provided by the submitting clinician and the standard processing schedule is under 72 hours  [facility]              Physical Exam  Vitals and nursing note reviewed  Constitutional:       General: He is not in acute distress  Appearance: He is well-developed  He is not diaphoretic  HENT:      Head: Normocephalic and atraumatic  Eyes:      General: No scleral icterus  Right eye: No discharge  Left eye: No discharge  Conjunctiva/sclera: Conjunctivae normal       Pupils: Pupils are equal, round, and reactive to light  Neck:      Thyroid: No thyromegaly  Vascular: No JVD  Cardiovascular:      Rate and Rhythm: Normal rate and regular rhythm  Heart sounds: Normal heart sounds  No murmur heard  No friction rub  No gallop  Pulmonary:      Effort: Pulmonary effort is normal  No respiratory distress  Breath sounds: Normal breath sounds  No wheezing or rales  Chest:      Chest wall: No tenderness  Abdominal:      General: Bowel sounds are normal  There is no distension  Palpations: Abdomen is soft  There is no mass  Tenderness: There is no abdominal tenderness  There is no guarding or rebound  Musculoskeletal:         General: No tenderness or deformity  Normal range of motion  Cervical back: Normal range of motion and neck supple     Lymphadenopathy:      " Cervical: No cervical adenopathy  Skin:     General: Skin is warm and dry  Coloration: Skin is not pale  Findings: No erythema or rash  Neurological:      Mental Status: He is alert and oriented to person, place, and time  Cranial Nerves: No cranial nerve deficit  Coordination: Coordination normal       Deep Tendon Reflexes: Reflexes are normal and symmetric  Psychiatric:         Behavior: Behavior normal          Thought Content:  Thought content normal          Judgment: Judgment normal

## 2023-08-08 ENCOUNTER — ESTABLISHED COMPREHENSIVE EXAM (OUTPATIENT)
Dept: URBAN - METROPOLITAN AREA CLINIC 6 | Facility: CLINIC | Age: 78
End: 2023-08-08

## 2023-08-08 DIAGNOSIS — H25.813: ICD-10-CM

## 2023-08-08 PROCEDURE — 92014 COMPRE OPH EXAM EST PT 1/>: CPT

## 2023-08-08 ASSESSMENT — VISUAL ACUITY
OD_CC: 20/30-2
OS_GLARE: 20/100
OD_GLARE: 20/80
OS_CC: 20/40+1

## 2023-08-08 ASSESSMENT — TONOMETRY
OS_IOP_MMHG: 19
OD_IOP_MMHG: 16

## 2023-09-01 ENCOUNTER — OFFICE VISIT (OUTPATIENT)
Dept: OBGYN CLINIC | Facility: CLINIC | Age: 78
End: 2023-09-01
Payer: MEDICARE

## 2023-09-01 ENCOUNTER — APPOINTMENT (OUTPATIENT)
Dept: RADIOLOGY | Facility: AMBULARY SURGERY CENTER | Age: 78
End: 2023-09-01
Attending: ORTHOPAEDIC SURGERY
Payer: MEDICARE

## 2023-09-01 VITALS
SYSTOLIC BLOOD PRESSURE: 129 MMHG | HEIGHT: 70 IN | BODY MASS INDEX: 26.86 KG/M2 | WEIGHT: 187.6 LBS | DIASTOLIC BLOOD PRESSURE: 78 MMHG | HEART RATE: 90 BPM

## 2023-09-01 DIAGNOSIS — M17.12 PRIMARY OSTEOARTHRITIS OF LEFT KNEE: ICD-10-CM

## 2023-09-01 DIAGNOSIS — M17.12 PRIMARY OSTEOARTHRITIS OF LEFT KNEE: Primary | ICD-10-CM

## 2023-09-01 PROCEDURE — 20610 DRAIN/INJ JOINT/BURSA W/O US: CPT | Performed by: ORTHOPAEDIC SURGERY

## 2023-09-01 PROCEDURE — 73562 X-RAY EXAM OF KNEE 3: CPT

## 2023-09-01 PROCEDURE — 99204 OFFICE O/P NEW MOD 45 MIN: CPT | Performed by: ORTHOPAEDIC SURGERY

## 2023-09-01 RX ORDER — BUPIVACAINE HYDROCHLORIDE 2.5 MG/ML
1 INJECTION, SOLUTION INFILTRATION; PERINEURAL
Status: COMPLETED | OUTPATIENT
Start: 2023-09-01 | End: 2023-09-01

## 2023-09-01 RX ORDER — METHYLPREDNISOLONE ACETATE 40 MG/ML
2 INJECTION, SUSPENSION INTRA-ARTICULAR; INTRALESIONAL; INTRAMUSCULAR; SOFT TISSUE
Status: COMPLETED | OUTPATIENT
Start: 2023-09-01 | End: 2023-09-01

## 2023-09-01 RX ORDER — KETOROLAC TROMETHAMINE 30 MG/ML
30 INJECTION, SOLUTION INTRAMUSCULAR; INTRAVENOUS
Status: COMPLETED | OUTPATIENT
Start: 2023-09-01 | End: 2023-09-01

## 2023-09-01 RX ADMIN — KETOROLAC TROMETHAMINE 30 MG: 30 INJECTION, SOLUTION INTRAMUSCULAR; INTRAVENOUS at 08:00

## 2023-09-01 RX ADMIN — METHYLPREDNISOLONE ACETATE 2 ML: 40 INJECTION, SUSPENSION INTRA-ARTICULAR; INTRALESIONAL; INTRAMUSCULAR; SOFT TISSUE at 08:00

## 2023-09-01 RX ADMIN — BUPIVACAINE HYDROCHLORIDE 1 ML: 2.5 INJECTION, SOLUTION INFILTRATION; PERINEURAL at 08:00

## 2023-09-01 NOTE — PATIENT INSTRUCTIONS
STRENGTHENING EXERCISES:   Quadriceps:   Isometric Quad sets                        Progression for Quadriceps/VMO:  Hold at 45 degree angle (Picture #1)    Key: Slow & Intentional  Two ways to perform:  Start with 10 reps on each side maintaining neutral pelvis. *   Hold position for a 3-5 count  When form and exercise because less challenging; increase the REPITITIONS or DURATION your holding. Perform Wall Squats below: 3 Sets of 10 Reps         Hold for 3-5 count          Progression for Quadriceps/VMO:  Use a ball hold between knees to activate VMO  Key: Slow & Intentional  Two ways to perform:  Start with 10 reps on each side maintaining neutral pelvis. *   Hold position for a 3-5 count  When form and exercise because less challenging; increase the REPITITIONS or DURATION your holding. Perform the Wall Squat w/ball: 3 sets 10 reps        Hold for 3-5 count                        Progression:   Adding a Theraband for resistance while maintaining good form during a wall squat is more challenging. Perform Wall squat w/Theraband:   3 sets 10 reps  Hold for a 3-5 count            Total Knee Replacement    If your knee is severely damaged by arthritis or injury, it may be hard for you to perform simple activities, such as walking or climbing stairs. You may even begin to feel pain while you are sitting or lying down. If nonsurgical treatments like medications and using walking supports are no longer helpful, you may want to consider total knee replacement surgery. Joint replacement surgery is a safe and effective procedure to relieve pain, correct leg deformity, and help you resume normal activities. Knee replacement surgery was first performed in 1968. Since then, improvements in surgical materials and techniques have greatly increased its effectiveness. Total knee replacements are one of the most successful procedures in all of medicine.  According to the [employer], in 2017, more than 754,000 knee replacements were performed in the [facility]. Whether you have just begun exploring treatment options or have already decided to have total knee replacement surgery, this article will help you understand more about this valuable procedure. Anatomy  The knee is the largest joint in the body and having healthy knees is required to perform most everyday activities. Normal knee anatomy. In a healthy knee, these structures work together to ensure smooth, natural function and movement. The knee is made up of the lower end of the thighbone (femur), the upper end of the shinbone (tibia), and the kneecap (patella). The ends of these three bones are covered with articular cartilage, a smooth substance that protects the bones and enables them to move easily within the joint. The menisci are located between the femur and tibia. These C-shaped wedges act as shock absorbers that cushion the joint. Large ligaments hold the femur and tibia together and provide stability. The long thigh muscles give the knee strength. All remaining surfaces of the knee are covered by a thin lining called the synovial membrane. This membrane releases a fluid that lubricates the cartilage, reducing friction to nearly zero in a healthy knee. Normally, all of these components work in harmony. But disease or injury can disrupt this harmony, resulting in pain, muscle weakness, and reduced function. Cause  The most common cause of chronic knee pain and disability is arthritis. Although there are many types of arthritis, most knee pain is caused by just three types: osteoarthritis, rheumatoid arthritis, and posttraumatic arthritis. Osteoarthritis. This is an age-related wear and tear type of arthritis. It usually occurs in people 48years of age and older, but may occur in younger people, too. The cartilage that cushions the bones of the knee softens and wears away.  The bones then rub against one another, causing knee pain and stiffness. Osteoarthritis often results in bone rubbing on bone. Bone spurs are a common feature of this form of arthritis. Rheumatoid arthritis. This is a disease in which the synovial membrane that surrounds the joint becomes inflamed and thickened. This chronic inflammation can damage the cartilage and eventually cause cartilage loss, pain, and stiffness. Rheumatoid arthritis is the most common form of a group of disorders termed "inflammatory arthritis."  Posttraumatic arthritis. This can follow a serious knee injury. Fractures of the bones surrounding the knee or tears of the knee ligaments may damage the articular cartilage over time, causing knee pain and limiting knee function. Description  A knee replacement (also called knee arthroplasty) might be more accurately termed a knee "resurfacing" because only the surface of the bones are replaced. There are four basic steps to a knee replacement procedure:  Prepare the bone. The damaged cartilage surfaces at the ends of the femur and tibia are removed along with a small amount of underlying bone. Position the metal implants. The removed cartilage and bone is replaced with metal components that recreate the surface of the joint. These metal parts may be cemented or "press-fit" into the bone. Resurface the patella. The undersurface of the patella (kneecap) is cut and resurfaced with a plastic button. Some surgeons do not resurface the patella, depending upon the case. Insert a spacer. A medical-grade plastic spacer is inserted between the metal components to create a smooth gliding surface. (Left) Severe osteoarthritis. (Right) The arthritic cartilage and underlying bone has been removed and resurfaced with metal implants on the femur and tibia. A plastic spacer has been placed in between the implants. The patellar component is not shown for clarity. Watch:  Total Knee Replacement Animation    Is Total Knee Replacement for You? The decision to have total knee replacement surgery should be a cooperative one between you, your family, your primary care doctor, and your orthopaedic surgeon. Your doctor may refer you to an orthopaedic surgeon for a thorough evaluation to determine if you might benefit from this surgery. When Surgery Is Recommended  There are several reasons why your doctor may recommend knee replacement surgery. People who benefit from total knee replacement often have:  Severe knee pain or stiffness that limits everyday activities, including walking, climbing stairs, and getting in and out of chairs. It may be hard to walk more than a few blocks without significant pain and it may be necessary to use a cane or walker  Moderate or severe knee pain while resting, either day or night  Chronic knee inflammation and swelling that does not improve with rest or medications  Knee deformity -- a bowing in or out of the knee  Failure to substantially improve with other treatments such as anti-inflammatory medications, cortisone injections, lubricating injections, physical therapy, or other surgeries    Total knee replacement may be recommended for patients with bowed knee deformity, like that shown in this clinical photo. Candidates for Surgery  There are no absolute age or weight restrictions for total knee replacement surgery. [facility] requires a BMI (body mass index) of 40 or below. Recommendations for surgery are based on a patient's pain and disability, not age. Most patients who undergo total knee replacement are aged 48 to 80, but orthopaedic surgeons evaluate patients individually. Total knee replacements have been performed successfully at all ages, from the young teenager with juvenile arthritis to the elderly patient with degenerative arthritis. The Orthopaedic Evaluation  An evaluation with an orthopaedic surgeon consists of several components:  Medical history.  Your orthopaedic surgeon will gather information about your general health and ask you about the extent of your knee pain and your ability to function. Physical examination. This will assess knee motion, stability, strength, and overall leg alignment. X-rays. These images help to determine the extent of damage and deformity in your knee. Other tests. Occasionally blood tests or advanced imaging, such as a magnetic resonance imaging (MRI) scan, may be needed to determine the condition of the bone and soft tissues of your knee. (Left) In this X-ray of a normal knee, the space between the bones indicates healthy cartilage (arrows). (Right) This X-ray of a knee that has become bowed from arthritis shows severe loss of joint space (arrows). Your orthopaedic surgeon will review the results of your evaluation with you and discuss whether total knee replacement is the best method to relieve your pain and improve your function. Other treatment options -- including medications, injections, physical therapy, or other types of surgery -- will also be considered and discussed. In addition, your orthopaedic surgeon will explain the potential risks and complications of total knee replacement, including those related to the surgery itself and those that can occur over time after your surgery. Related Articles  DISEASES & CONDITIONS  Arthritis of the Knee  TREATMENT  Obesity, Weight Loss, and Joint Replacement Surgery  TREATMENT  Preparing for Surgery: Health Condition Checklist  RECOVERY  Total Knee Replacement Exercise Guide    Deciding to Have Knee Replacement Surgery  Realistic Expectations  An important factor in deciding whether to have total knee replacement surgery is understanding what the procedure can and cannot do. Most people who have total knee replacement surgery experience a dramatic reduction of knee pain and a significant improvement in the ability to perform common activities of daily living.  But total knee replacement will not allow you to do more than you could before you developed arthritis. With normal use and activity, every knee replacement implant begins to wear in its plastic spacer. Excessive activity or weight may speed up this normal wear and may cause the knee replacement to loosen and become painful. Therefore, most surgeons advise against high-impact activities such as running, jogging, jumping, or other high-impact sports for the rest of your life after surgery. Realistic activities following total knee replacement include unlimited walking, swimming, golf, driving, light hiking, biking, ballroom dancing, and other low-impact sports. With appropriate activity modification, knee replacements can last for many years. Possible Complications of Surgery  The complication rate following total knee replacement is low. Serious complications, such as a knee joint infection, occur in fewer than 2% of patients. Major medical complications such as heart attack or stroke occur even less frequently. Chronic illnesses may increase the potential for complications. Although uncommon, when these complications occur, they can prolong or limit full recovery. Discuss your concerns thoroughly with your orthopaedic surgeon prior to surgery. Infection. Infection may occur in the wound or deep around the prosthesis. It may happen within days or weeks of your surgery. It may even occur years later. Minor infections in the wound area are generally treated with antibiotics. Major or deep infections may require more surgery and removal of the prosthesis. Any infection in your body can spread to your joint replacement. Blood clots. Blood clots in the leg veins are one of the most common complications of knee replacement surgery. These clots can be life-threatening if they break free and travel to your lungs.  Your orthopaedic surgeon will outline a prevention program, which may include periodic elevation of your legs, lower leg exercises to increase circulation, support stockings, and medication to thin your blood. Blood clots may form in one of the deep veins of the body. While blood clots can occur in any deep vein, they most commonly form in the veins of the pelvis, calf, or thigh. Implant problems. Although implant designs and materials, as well as surgical techniques, continue to advance, implant surfaces may wear down and the components may loosen. Additionally, although an average of 115° of motion is generally anticipated after surgery, scarring of the knee can occasionally occur, and motion may be more limited, particularly in patients with limited motion before surgery. Continued pain. A small number of patients continue to have pain after a knee replacement. This complication is rare, however, and most patients experience excellent pain relief following knee replacement. Neurovascular injury. While rare, injury to the nerves or blood vessels around the knee can occur during surgery. Preparing for Surgery  Medical Evaluation  If you decide to have total knee replacement surgery, your orthopaedic surgeon may ask you to schedule a complete physical examination with your doctor several weeks before the operation. This is needed to make sure you are healthy enough to have the surgery and complete the recovery process. Many patients with chronic medical conditions, like heart disease, may also be evaluated by a specialist, such as a cardiologist, before the surgery. Tests  Several tests, such as blood and urine samples, and an electrocardiogram, may be needed to help your orthopaedic surgeon plan your surgery. Medications  Tell your orthopaedic surgeon about the medications you are taking. He or she will tell you which medications you should stop taking and which you should continue to take before surgery.     Dental Evaluation  Although the incidence of infection after knee replacement is very low, an infection can occur if bacteria enter your bloodstream. To reduce the risk of infection, major dental procedures (such as tooth extractions and periodontal work) should be completed before your total knee replacement surgery. Urinary Evaluations  People with a history of recent or frequent urinary infections should have a urological evaluation before surgery. Older men with prostate disease should consider completing required treatment before undertaking knee replacement surgery. Social Planning  Although you will be able to walk with a cane, crutches, or a walker soon after surgery, you will need help for several weeks with such tasks as cooking, shopping, bathing, and doing laundry. If you live alone, a  or a discharge planner at the hospital can help you make advance arrangements to have someone assist you at home. They also can help you arrange for a short stay in an extended care facility during your recovery if this option works best for you. Home Planning  Several modifications can make your home easier to navigate during your recovery. The following items may help with daily activities:  Safety bars or a secure handrail in your shower or bath  Secure handrails along your stairways  A stable chair for your early recovery with a firm seat cushion (and a height of 18 to 20 inches), a firm back, two arms, and a footstool for intermittent leg elevation  A toilet seat riser with arms, if you have a low toilet  A stable shower bench or chair for bathing  Removing all loose carpets and cords  A temporary living space on the same floor because walking up or down stairs will be more difficult during your early recovery  Get more tips on preparing your home for your total knee replacement in this infographic (click on image for full infographic). Your Surgery  You will either be admitted to the hospital on the day of your surgery, or you will go home the same day.  The plan to either be admitted or to go home should be discussed with your surgeon prior to your operation. Anesthesia  Upon arrival at the hospital or surgery center, you will be evaluated by a member of the anesthesia team. The most common types of anesthesia are general anesthesia (you are put to sleep) or spinal, epidural, or regional nerve block anesthesia (you are awake but your body is numb from the waist down). The anesthesia team, with your input, will determine which type of anesthesia will be best for you. Procedure  The surgical procedure usually takes from 1 to 2 hours. Your orthopaedic surgeon will remove the damaged cartilage and bone, and then position the new metal and plastic implants to restore the alignment and function of your knee. Different types of knee implants are used to meet each patient's individual needs. (Left) An X-ray of a severely arthritic knee. (Right) The X-ray appearance of a total knee replacement. Note that the plastic spacer inserted between the components does not show up in an X-ray. After surgery, you will be moved to the recovery room, where you will remain for several hours while your recovery from anesthesia is monitored. After you wake up, you will be taken to your hospital room or discharged to home. Your Hospital Stay  If you are admitted to the hospital, you will most likely stay from one to three days. Pain Management  After surgery, you will feel some pain. This is a natural part of the healing process. Your doctor and nurses will work to reduce your pain, which can help you recover from surgery faster. Medications are often prescribed for short-term pain relief after surgery. Many types of medicines are available to help manage pain, including opioids, nonsteroidal anti-inflammatory drugs (NSAID's), acetaminophen, and local anesthetics.  Your doctor may use a combination of these medications to improve pain relief, as well as minimize the need for opioids. Be aware that although opioids help relieve pain after surgery, they are a narcotic and can be addictive. Opioid dependency and overdose have become  critical public health issues in the [place] It is important to use opioids only as directed by your doctor. As soon as your pain begins to improve, stop taking opioids. Talk to your doctor if your pain has not begun to improve within a few days of your surgery. Blood Clot Prevention  Your orthopaedic surgeon may prescribe one or more measures to prevent blood clots and decrease leg swelling. These may include special support hose, inflatable leg coverings (compression boots), and blood thinners. Foot and ankle movement is also encouraged immediately following surgery to increase blood flow in your leg muscles to help prevent leg swelling and blood clots. Physical Therapy  Most patients can begin exercising their knee hours after surgery. A physical therapist will teach you specific exercises to strengthen your leg and restore knee movement to allow walking and other normal daily activities soon after your surgery. To restore movement in your knee and leg, your surgeon may use a knee support that slowly moves your knee while you are in bed. The device is called a continuous passive motion (CPM) exercise machine. Some surgeons believe that a CPM machine decreases leg swelling by elevating your leg and improves your blood circulation by moving the muscles of your leg, but there is no evidence that these machines improve outcomes. A continuous passive motion (CPM) machine. Preventing Pneumonia  It is common for patients to have shallow breathing in the early postoperative period. This is usually due to the effects of anesthesia, pain medications, and increased time spent in bed. This shallow breathing can lead to a partial collapse of the lungs (termed "atelectasis"), which can make patients susceptible to pneumonia.  To help prevent this, it is important to take frequent deep breaths. Your nurse may provide a simple breathing apparatus called a spirometer to encourage you to take deep breaths. Your Recovery at Home  The success of your surgery will depend largely on how well you follow your orthopaedic surgeon's instructions at home during the first few weeks after surgery. Wound Care  You will have stitches or staples running along your wound or a suture beneath your skin on the front of your knee. The stitches or staples will be removed several weeks after surgery. A suture beneath your skin will not require removal.    Avoid soaking the wound in water until it has thoroughly sealed and dried. You may continue to bandage the wound to prevent irritation from clothing or support stockings. Diet  Some loss of appetite is common for several weeks after surgery. A balanced diet, often with an iron supplement, is important to help your wound heal and to restore muscle strength. Activity  Exercise is a critical component of home care, particularly during the first few weeks after surgery. You should be able to resume most normal activities of daily living within 3 to 6 weeks following surgery. Some pain with activity and at night is common for several weeks after surgery. Your activity program should include:  A graduated walking program -- initially in your home and later outside -- to slowly increase your mobility  Resuming other normal household activities, such as sitting, standing, and climbing stairs  Specific exercises several times a day to restore movement and strengthen your knee. You probably will be able to perform the exercises without help, but you may have a physical therapist help you at home or in a therapy center the first few weeks after surgery. Physical therapy will help restore movement and function.   [employer] © 2011     You will most likely be able to resume driving when your knee bends enough that you can enter and sit comfortably in your car, and when your muscle control provides adequate reaction time for braking and acceleration. Most people resume driving approximately 4 to 6 weeks after surgery. Avoiding Problems After Surgery  Recognizing the Signs of a Blood Clot  Follow your orthopaedic surgeon's instructions carefully to reduce the risk of blood clots developing during the first several weeks of your recovery. They may recommend that you continue taking the blood thinning medication you started in the hospital. Notify your doctor immediately if you develop any of the following warning signs. Warning signs of blood clots. The warning signs of possible blood clots in your leg include: Increasing pain in your calf  Tenderness or redness above or below your knee  New or increasing swelling in your calf, ankle, and foot  Warning signs of pulmonary embolism. The warning signs that a blood clot has traveled to your lung include:  Sudden shortness of breath  Sudden onset of chest pain  Localized chest pain with coughing    Preventing Infection  A common cause of infection following total knee replacement surgery is from bacteria that enter the bloodstream during dental procedures, urinary tract infections, or skin infections. These bacteria can lodge around your knee replacement and cause an infection. After knee replacement, patients with certain risk factors may need to take antibiotics prior to dental work, including dental cleanings, or before any surgical procedure that could allow bacteria to enter the bloodstream. Your orthopaedic surgeon will discuss with you whether you need to take preventive antibiotics before dental procedures. Warning signs of infection.  Notify your doctor immediately if you develop any of the following signs of a possible knee replacement infection:  Persistent fever (higher than 100°F orally)  Chills  Increasing redness, tenderness, or swelling of the knee wound  Drainage from the knee wound  Increasing knee pain with both activity and rest    Avoiding Falls  A fall during the first few weeks after surgery can damage your new knee and may result in a need for further surgery. Stairs are a particular hazard until your knee is strong and mobile. You should use a cane, crutches, a walker, or handrails, or have someone to help you until you have improved your balance, flexibility, and strength. Your surgeon and physical therapist will help you decide what assistive aides will be required following surgery and when those aides can safely be discontinued. Outcomes  How Your New Knee Is Different  Improvement of knee motion is a goal of total knee replacement, but restoration of full motion is uncommon. The motion of your knee replacement after surgery can be predicted by the range of motion you have in your knee before surgery. Most patients can expect to be able to almost fully straighten the replaced knee and to bend the knee sufficiently to climb stairs and get in and out of a car. Kneeling is sometimes uncomfortable, but it is not harmful. Most people feel some numbness in the skin around their incisions. You also may feel some stiffness, particularly with excessive bending activities. Most people also feel or hear some clicking of the metal and plastic with knee bending or walking. This is normal. These differences often diminish with time and most patients find them to be tolerable when compared with the pain and limited function they experienced prior to surgery. Your new knee may activate metal detectors required for security in airports and some buildings. Tell the  about your knee replacement if the alarm is activated. Protecting Your Knee Replacement  After surgery, make sure you also do the following:  Participate in regular light exercise programs to maintain proper strength and mobility of your new knee.   Take special precautions to avoid falls and injuries. If you break a bone in your leg, you may require more surgery. Let your dentist know that you have a knee replacement. Talk with your orthopaedic surgeon about whether you need to take antibiotics prior to dental procedures. See your orthopaedic surgeon periodically for routine follow-up examinations and X-rays. Your surgeon will talk with you about the frequency and timing of these visits. Extending the Life of Your Knee Implant  Currently, more than 90% of modern total knee replacements are still functioning well 15 years after the surgery. Following your orthopaedic surgeon's instructions after surgery and taking care to protect your knee replacement and your general health are important ways you can contribute to the final success of your surgery. To assist doctors in the surgical management of osteoarthritis of the knee, the American Academy of [employer]geons has conducted research to provide some useful guidelines. These are recommendations only and may not apply to every case. For more information: Surgical Management of Osteoarthritis of the Knee - Clinical Practice Guideline (CPG)  [employer] (aaos. org)

## 2023-09-01 NOTE — PROGRESS NOTES
Assessment:   Diagnosis ICD-10-CM Associated Orders   1. Primary osteoarthritis of left knee  M17.12 Ambulatory Referral to Orthopedic Surgery     XR knee 3 vw left non injury     Ambulatory Referral to Physical Therapy     Large joint arthrocentesis: L knee          Plan:  • The patient's x-rays were reviewed today. • Discussed treatment options moving forward including gentle range of motion, physical therapy, bracing, corticosteroid injections, viscosupplementation, over the counter analgesics and topical creams, total knee arthoplasty   • The patient was provided home exercises to strengthen both knees. • The benefits and purpose of the operations and/or procedure have been explained to me in language I understand by Dr. Sky Keen well as the risks and benefits, alternatives and complications pertaining to the above procedure/surgery which include but is not limited to: infections, deeps vein thrombosis, blood clots to the lungs, wound healing problems, complications from extensive blood loss, including shock, possible death, continued pain, fracture, vascular or nerve injury, potential need for further surgery/revision, persistent pain/stiffness/instability, failure of hardware,heart attack, stroke and not obtaining results patient used. • Ortho Info packet regarding total knee arthroplasty was provided in the patient's after visit summary for further review. • The patient was referred to formal physical therapy. • The patient was offered a corticosteroid injection for their left knee. They tolerated the procedure well. • The patient was educated they may have some irritation in the next few days and should rest, ice, elevate and perform gentle range of motion exercises. They were advised the medicine should begin to work in a few days time. They were informed their blood sugar levels can temporarily elevate and should reach out to their PCP if this becomes an issue.  The patient was informed corticosteroid injections can be repeated at the earliest every 3 months. • I will see the patient back in approximately 3 months for repeat evaluation and possible repeat corticosteroid injection or surgical discussion. To do next visit:  Return in about 3 months (around 12/1/2023) for f/u, L, Knee. The above stated was discussed in layman's terms and the patient expressed understanding. All questions were answered to the patient's satisfaction. Scribe Attestation    I,:  Gilda Deleon am acting as a scribe while in the presence of the attending physician.:       I,:  Mehnaz Melton MD personally performed the services described in this documentation    as scribed in my presence.:             Subjective:   Judge Richards is a 69 y.o. male who presents for initial evaluation of left knee. The patient has as history of left knee arthroscopy about 22 years ago. His pain began about 5 years ago and he was x-rayed at that point. Over the past year his symptoms have worsened. He cannot walk as long as he used to. He wears a compressive sleeve when active. He uses heat on his knee after a bothersome day. He has occasional swelling. He has occasional episodes of instability. He has stiffness after prolonged sitting in both knees. He never had injections to his knee. He is not a diabetic or a smoker. He is actively being watched for prostate cancer. He takes a low dose aspirin daily and uses this to manage his headaches as well. He lives at home with his wife in a two story home but does have access to a bedroom on the first floor. The patient volunteers during tax period. Review of systems negative unless otherwise specified in HPI  Review of Systems   Constitutional: Negative for appetite change and unexpected weight change. HENT: Negative for congestion and trouble swallowing. Eyes: Negative for visual disturbance. Respiratory: Negative for cough and shortness of breath.     Cardiovascular: Negative for chest pain and palpitations. Gastrointestinal: Negative for nausea and vomiting. Endocrine: Negative for cold intolerance and heat intolerance. Musculoskeletal: Positive for arthralgias. Negative for joint swelling and myalgias. Skin: Negative for rash. Neurological: Negative for numbness.        Past Medical History:   Diagnosis Date   • Allergic Spring pollens - years ago   • Arthritis 2018    Knee   • Basal cell carcinoma of nose     removed 07/2015   • BPH with elevated PSA 12/11/2018   • Cancer ([address])    • Hypercholesterolemia    • Hyperlipidemia    • Mitral valve disorder    • Osteoarthritis    • Seasonal allergies     resolved 01/21/2015       Past Surgical History:   Procedure Laterality Date   • COLONOSCOPY     • DENTAL SURGERY      1970's   • KNEE SURGERY     • OTHER SURGICAL HISTORY      cerumen removal 1st 05/26/2011;   2nd:  08/02/2012   • NC BX PROSTATE STRTCTC SATURATION SAMPLING IMG GID N/A 6/8/2022    Procedure: TRANSPERINEAL MRI FUSION BIOPSY PROSTATE;  Surgeon: Lamont Gardner MD;  Location: [facility];  Service: Urology   • NC PROSTATE NEEDLE BIOPSY ANY APPROACH N/A 4/6/2021    Procedure: TRANSPERINEAL MRI FUSION PROSTATE BIOPSY;  Surgeon: Eileen Cordero MD;  Location: [facility];  Service: Urology   • PROSTATE BIOPSY         Family History   Problem Relation Age of Onset   • Alcohol abuse Father         independently diagnosed    • Heart disease Father    • Cancer Father    • Hypertension Mother    • COPD Mother    • Diabetes type II Mother    • Hypertension Maternal Grandmother    • Coronary artery disease Maternal Grandmother    • Diabetes Maternal Grandmother    • Lung cancer Maternal Grandmother    • Coronary artery disease Family    • Stroke Paternal Grandmother    • Lung cancer Maternal Grandfather        Social History     Occupational History   • Occupation:    Tobacco Use   • Smoking status: Never   • Smokeless tobacco: Never   Vaping Use   • Vaping Use: Never used   Substance and Sexual Activity   • Alcohol use: Yes     Comment: Less than one drink per week   • Drug use: No   • Sexual activity: Not on file         Current Outpatient Medications:   •  Ascorbic Acid (VITAMIN C ER) 500 MG CPCR, Take 1 capsule by mouth daily, Disp: , Rfl:   •  aspirin (ECOTRIN LOW STRENGTH) 81 mg EC tablet, Take 81 mg by mouth daily, Disp: , Rfl:   •  co-enzyme Q-10 100 mg capsule, Take 100 mg by mouth daily, Disp: , Rfl:   •  ezetimibe (ZETIA) 10 mg tablet, Take 1 tablet (10 mg total) by mouth daily, Disp: 90 tablet, Rfl: 1  •  ketoconazole (NIZORAL) 2 % shampoo, if needed, Disp: , Rfl:   •  Multiple Vitamins-Minerals (MULTIVITAMIN ADULT) TABS, Take 1 tablet by mouth daily, Disp: , Rfl:   •  Omega-3 Fatty Acids (FISH OIL) 1200 MG CAPS, Take 2 capsules by mouth daily , Disp: , Rfl:   •  Vitamin D, Cholecalciferol, 25 MCG (1000 UT) TABS, in the morning, Disp: , Rfl:     No Known Allergies         Vitals:    09/01/23 0751   BP: 129/78   Pulse: 90       Objective:                    Ortho Exam  Left Knee  Valgus alignment  Range of motion from 15-20 to 120. There is mild crepitus with range of motion. There is no effusion. There is tenderness over the lateral joint line. The patient is able to perform a straight leg raise. Negative patellar grind test.   Stable to valgus and varus stress  Toes are pink and mobile  Compartments are soft  Brisk capillary refill  Sensation intact  No ligament laxity  The patient is neurovascular intact distally. Diagnostics, reviewed and taken today if performed as documented: The attending physician has personally reviewed the pertinent films in PACS and interpretation is as follows:    X-rays taken 9/1/23 of Left knee demonstrate severe degenerative changes at the lateral tibiofemoral joint space. Moderate degenerative changes of the patellofemoral joint space. Osteophyte formation. No acute fracture.      Procedures, if performed today:    Large joint arthrocentesis: L knee  Universal Protocol:  Consent: Verbal consent obtained. Risks and benefits: risks, benefits and alternatives were discussed  Consent given by: patient  Time out: Immediately prior to procedure a "time out" was called to verify the correct patient, procedure, equipment, support staff and site/side marked as required. Timeout called at: 9/1/2023 8:44 AM.  Patient understanding: patient states understanding of the procedure being performed  Site marked: the operative site was marked  Patient identity confirmed: verbally with patient    Supporting Documentation  Indications: pain   Procedure Details  Location: knee - L knee  Preparation: Patient was prepped and draped in the usual sterile fashion  Needle size: 22 G  Ultrasound guidance: no  Approach: anterolateral  Medications administered: 1 mL bupivacaine 0.25 %; 2 mL methylPREDNISolone acetate 40 mg/mL; 30 mg ketorolac 30 mg/mL    Patient tolerance: patient tolerated the procedure well with no immediate complications  Dressing:  Sterile dressing applied            Portions of the record may have been created with voice recognition software. Occasional wrong word or "sound a like" substitutions may have occurred due to the inherent limitations of voice recognition software. Read the chart carefully and recognize, using context, where substitutions have occurred.

## 2023-09-06 ENCOUNTER — EVALUATION (OUTPATIENT)
Dept: PHYSICAL THERAPY | Facility: CLINIC | Age: 78
End: 2023-09-06
Payer: MEDICARE

## 2023-09-06 DIAGNOSIS — M17.12 PRIMARY OSTEOARTHRITIS OF LEFT KNEE: ICD-10-CM

## 2023-09-06 DIAGNOSIS — M25.562 CHRONIC PAIN OF LEFT KNEE: Primary | ICD-10-CM

## 2023-09-06 DIAGNOSIS — G89.29 CHRONIC PAIN OF LEFT KNEE: Primary | ICD-10-CM

## 2023-09-06 PROCEDURE — 97110 THERAPEUTIC EXERCISES: CPT | Performed by: PHYSICAL THERAPIST

## 2023-09-06 PROCEDURE — 97162 PT EVAL MOD COMPLEX 30 MIN: CPT | Performed by: PHYSICAL THERAPIST

## 2023-09-06 NOTE — PROGRESS NOTES
PT Evaluation     Today's date: 2023  Patient name: Jackson Johnson  : 1945  MRN: [id number]  Referring provider: Sanjeev Alexandra MD  Dx:   Encounter Diagnosis     ICD-10-CM    1. Chronic pain of left knee  M25.562     G89.29       2. Primary osteoarthritis of left knee  M17.12 Ambulatory Referral to Physical Therapy                     Assessment  Assessment details: Pt presents with s/s consistent with L knee OA with a valgus deformity. He will benefit from skilled PT services to address his impairments in order to decrease pain and restore function. Impairments: abnormal gait, abnormal muscle firing, abnormal or restricted ROM, abnormal movement, activity intolerance, impaired physical strength, lacks appropriate home exercise program, pain with function, weight-bearing intolerance and poor body mechanics  Understanding of Dx/Px/POC: good   Prognosis: good    Goals  STG - 4 wks  1) pt will demonstrate PROM 6-125 deg  2) pt will improve pain 25%    LTG - 8-12 wks  1) pt will demonstrate PROM 3-130 deg  2) pt will be able to ascend 8" steps on the L  3) pt will improve pain 50%    Plan  Planned modality interventions: low level laser therapy  Planned therapy interventions: joint mobilization, manual therapy, neuromuscular re-education, patient education, postural training, strengthening, stretching, therapeutic activities, therapeutic exercise, home exercise program, gait training, functional ROM exercises, flexibility and body mechanics training  Frequency: 2x week  Duration in weeks: 12  Treatment plan discussed with: patient and family        Subjective Evaluation    History of Present Illness  Mechanism of injury: Pt is a 69 y.o. male with PMHx significant for prostate CA, L knee scope 23 y.a. He reports a 5-yr Hx of L knee pain that has worsened over the past 1 yr. He reports significant pain relief s/p CSI 5 days ago. He denies trauma, falls, giving-way.   Patient Goals  Patient goals for therapy: decreased pain, increased motion, increased strength and independence with ADLs/IADLs    Pain  Current pain ratin  At best pain ratin  At worst pain ratin  Quality: dull ache, grinding and pressure  Aggravating factors: walking, standing, stair climbing and sitting (5-10 minute walking tolerance)    Social Support  Lives in: multiple-level home  Lives with: spouse      Diagnostic Tests  X-ray: abnormal (moderate PF OA, severe lateral compartment OA, valgus deformity)  Treatments  Previous treatment: injection treatment        Objective     Observations   Left Knee   Positive for deformity (valgus WB and NWB) and edema (1+ post capsule). Active Range of Motion   Left Knee   Flexion: 117 degrees   Extension: 12 degrees     Passive Range of Motion   Left Knee   Flexion: 120 degrees with pain  Extension: 10 degrees     Mobility   Patellar Mobility:   Left Knee   WFL: medial and lateral.   Hypomobile: left superior and left inferior    Strength/Myotome Testing     Left Hip   Planes of Motion   Flexion: 4-  Extension: 4  Abduction: 4-  Adduction: 4    Left Knee   Flexion: 4+  Extension: 4  Quadriceps contraction: fair    Tests     Left Knee   Negative valgus stress test at 0 degrees, valgus stress test at 30 degrees, varus stress test at 0 degrees and varus stress test at 30 degrees. General Comments:      Knee Comments  Gait: no AD: severe L knee valgus t/o gait cycle, severe loss of L TKE at midstance             Precautions:  PMHx: prostate CA  Dx: L knee OA    Manuals             L knee PROM                                                    Neuro Re-Ed                                                                                                        Ther Ex             bike             Heel slides 5"x10            Quad sets 10"x10            Supine HA stretch with self OP 15"x3            SLR 1x10 3x10           SL hip ABD 1x10 3x10           Standing GA stretch Ther Activity             STS: avoid valgus             Step-ups: avoid valgus                          Gait Training                                       Modalities

## 2023-09-11 ENCOUNTER — OFFICE VISIT (OUTPATIENT)
Dept: PHYSICAL THERAPY | Facility: CLINIC | Age: 78
End: 2023-09-11
Payer: MEDICARE

## 2023-09-11 DIAGNOSIS — M25.562 CHRONIC PAIN OF LEFT KNEE: ICD-10-CM

## 2023-09-11 DIAGNOSIS — M17.12 PRIMARY OSTEOARTHRITIS OF LEFT KNEE: Primary | ICD-10-CM

## 2023-09-11 DIAGNOSIS — G89.29 CHRONIC PAIN OF LEFT KNEE: ICD-10-CM

## 2023-09-11 PROCEDURE — 97110 THERAPEUTIC EXERCISES: CPT | Performed by: PHYSICAL THERAPIST

## 2023-09-11 PROCEDURE — 97530 THERAPEUTIC ACTIVITIES: CPT | Performed by: PHYSICAL THERAPIST

## 2023-09-15 ENCOUNTER — OFFICE VISIT (OUTPATIENT)
Dept: PHYSICAL THERAPY | Facility: CLINIC | Age: 78
End: 2023-09-15
Payer: MEDICARE

## 2023-09-15 DIAGNOSIS — G89.29 CHRONIC PAIN OF LEFT KNEE: ICD-10-CM

## 2023-09-15 DIAGNOSIS — M17.12 PRIMARY OSTEOARTHRITIS OF LEFT KNEE: Primary | ICD-10-CM

## 2023-09-15 DIAGNOSIS — M25.562 CHRONIC PAIN OF LEFT KNEE: ICD-10-CM

## 2023-09-15 PROCEDURE — 97110 THERAPEUTIC EXERCISES: CPT | Performed by: PHYSICAL THERAPIST

## 2023-09-15 PROCEDURE — 97530 THERAPEUTIC ACTIVITIES: CPT | Performed by: PHYSICAL THERAPIST

## 2023-09-15 NOTE — PROGRESS NOTES
Daily Note     Today's date: 9/15/2023  Patient name: Cheli Briceño  : 1945  MRN: [id number]  Referring provider: Reji White MD  Dx:   Encounter Diagnosis     ICD-10-CM    1. Primary osteoarthritis of left knee  M17.12       2. Chronic pain of left knee  M25.562     G89.29                      Subjective: Pt reports no change in pain levels since last visit. Objective: See treatment diary below  PROM: 8-122 deg    Assessment: Pt demonstrated mildly improved PROM. Plan: Cont. POC. Precautions:  PMHx: prostate CA  Dx: L knee OA    Manuals 9/6 9/11 9/15          L knee PROM  10"x10 ea 10"x10 ea                                                 Neuro Re-Ed                                                                                                        Ther Ex             bike  L1  5 min L1  5 min          Heel slides 5"x10 5"x15 5"x15          Quad sets 10"x10 5"x15 5"x15          Supine HA stretch with self OP 15"x3 15"x3 15"x3          SLR 1x10 3x10 3x10          SL hip ABD 1x10 3x10 3x10          clamshells   R/  3x10                       Ther Activity             STS: avoid valgus  1x10 1x15          Step-ups: avoid valgus  6"/  1x15 6"/  1x20                       Gait Training                                       Modalities

## 2023-09-18 ENCOUNTER — OFFICE VISIT (OUTPATIENT)
Dept: PHYSICAL THERAPY | Facility: CLINIC | Age: 78
End: 2023-09-18
Payer: MEDICARE

## 2023-09-18 DIAGNOSIS — M25.562 CHRONIC PAIN OF LEFT KNEE: ICD-10-CM

## 2023-09-18 DIAGNOSIS — M17.12 PRIMARY OSTEOARTHRITIS OF LEFT KNEE: Primary | ICD-10-CM

## 2023-09-18 DIAGNOSIS — G89.29 CHRONIC PAIN OF LEFT KNEE: ICD-10-CM

## 2023-09-18 PROCEDURE — 97110 THERAPEUTIC EXERCISES: CPT | Performed by: PHYSICAL THERAPIST

## 2023-09-18 NOTE — PROGRESS NOTES
Daily Note     Today's date: 2023  Patient name: Vonda Fuchs  : 1945  MRN: [id number]  Referring provider: Alisha Sanon MD  Dx:   Encounter Diagnosis     ICD-10-CM    1. Primary osteoarthritis of left knee  M17.12       2. Chronic pain of left knee  M25.562     G89.29                      Subjective: Pt reports mildly improved stiffness. Objective: See treatment diary below  PROM: 7-122 deg    Assessment: Pt demonstrated mildly improved extension PROM. Plan: Cont. POC. Precautions:  PMHx: prostate CA  Dx: L knee OA    Manuals 9/6 9/11 9/15 9/18         L knee PROM  10"x10 ea 10"x10 ea 10"x10 ea                                                Neuro Re-Ed                                                                                                        Ther Ex             bike  L1  5 min L1  5 min L1  5 min         Heel slides 5"x10 5"x15 5"x15 5"x15         Quad sets 10"x10 5"x15 5"x15 5"x15         Supine HA stretch with self OP 15"x3 15"x3 15"x3 15"x3         SLR 1x10 3x10 3x10 3x12         SL hip ABD 1x10 3x10 3x10 3x12         clamshells   R/  3x10 R/  3x10 R/  3x15                     Ther Activity             STS: avoid valgus  1x10 1x15 1x15         Step-ups: avoid valgus  6"/  1x15 6"/  1x20 6"/  1x20                      Gait Training                                       Modalities

## 2023-09-21 ENCOUNTER — OFFICE VISIT (OUTPATIENT)
Dept: PHYSICAL THERAPY | Facility: CLINIC | Age: 78
End: 2023-09-21
Payer: MEDICARE

## 2023-09-21 DIAGNOSIS — G89.29 CHRONIC PAIN OF LEFT KNEE: ICD-10-CM

## 2023-09-21 DIAGNOSIS — M17.12 PRIMARY OSTEOARTHRITIS OF LEFT KNEE: Primary | ICD-10-CM

## 2023-09-21 DIAGNOSIS — M25.562 CHRONIC PAIN OF LEFT KNEE: ICD-10-CM

## 2023-09-21 PROCEDURE — 97110 THERAPEUTIC EXERCISES: CPT

## 2023-09-21 NOTE — PROGRESS NOTES
Daily Note     Today's date: 2023  Patient name: Mindy Sousa  : 1945  MRN: [id number]  Referring provider: Jenn Plummer MD  Dx:   Encounter Diagnosis     ICD-10-CM    1. Primary osteoarthritis of left knee  M17.12       2. Chronic pain of left knee  M25.562     G89.29           Start Time: 2481  Stop Time: 1230  Total time in clinic (min): 45 minutes    Subjective: Patient reports, "I've been improving". Objective: See treatment diary below  PROM: 7-122 deg    Assessment: Patient maintained PROM and demonstrates good form t/o therapy. Plan: Cont. POC. Precautions:  PMHx: prostate CA  Dx: L knee OA    Manuals 9/6 9/11 9/15 9/18 9/21        L knee PROM  10"x10 ea 10"x10 ea 10"x10 ea 10"x10 ea                                               Neuro Re-Ed                                                                                                        Ther Ex             bike  L1  5 min L1  5 min L1  5 min L1  5 min        Heel slides 5"x10 5"x15 5"x15 5"x15 5"x15        Quad sets 10"x10 5"x15 5"x15 5"x15 5"x15        Supine HA stretch with self OP 15"x3 15"x3 15"x3 15"x3 15"x3        SLR 1x10 3x10 3x10 3x12 3x12        SL hip ABD 1x10 3x10 3x10 3x12 3x12        clamshells   R/  3x10 R/  3x10 R/  3x15                     Ther Activity             STS: avoid valgus  1x10 1x15 1x15 2x10        Step-ups: avoid valgus  6"/  1x15 6"/  1x20 6"/  1x20 6"  1x20                     Gait Training                                       Modalities

## 2023-10-02 ENCOUNTER — OFFICE VISIT (OUTPATIENT)
Dept: PHYSICAL THERAPY | Facility: CLINIC | Age: 78
End: 2023-10-02
Payer: MEDICARE

## 2023-10-02 DIAGNOSIS — M25.562 CHRONIC PAIN OF LEFT KNEE: ICD-10-CM

## 2023-10-02 DIAGNOSIS — G89.29 CHRONIC PAIN OF LEFT KNEE: ICD-10-CM

## 2023-10-02 DIAGNOSIS — M17.12 PRIMARY OSTEOARTHRITIS OF LEFT KNEE: Primary | ICD-10-CM

## 2023-10-02 PROCEDURE — 97530 THERAPEUTIC ACTIVITIES: CPT

## 2023-10-02 PROCEDURE — 97110 THERAPEUTIC EXERCISES: CPT

## 2023-10-06 ENCOUNTER — OFFICE VISIT (OUTPATIENT)
Dept: PHYSICAL THERAPY | Facility: CLINIC | Age: 78
End: 2023-10-06
Payer: MEDICARE

## 2023-10-06 DIAGNOSIS — G89.29 CHRONIC PAIN OF LEFT KNEE: ICD-10-CM

## 2023-10-06 DIAGNOSIS — M17.12 PRIMARY OSTEOARTHRITIS OF LEFT KNEE: Primary | ICD-10-CM

## 2023-10-06 DIAGNOSIS — M25.562 CHRONIC PAIN OF LEFT KNEE: ICD-10-CM

## 2023-10-06 PROCEDURE — 97110 THERAPEUTIC EXERCISES: CPT | Performed by: PHYSICAL THERAPIST

## 2023-10-06 NOTE — PROGRESS NOTES
Daily Note     Today's date: 10/6/2023  Patient name: Reuben Hill  : 1945  MRN: [id number]  Referring provider: Claire Diez MD  Dx:   Encounter Diagnosis     ICD-10-CM    1. Primary osteoarthritis of left knee  M17.12       2. Chronic pain of left knee  M25.562     G89.29                      Subjective: Pt reports a 30-minute tolerance to WB activities. Objective: See treatment diary below    Assessment: Pt demonstrated no pain with strengthening progressions. Plan: Cont. POC. Precautions:  PMHx: prostate CA  Dx: L knee OA    Manuals 9/6 9/11 9/15 9/18 9/21 10/2 10/6      L knee PROM  10"x10 ea 10"x10 ea 10"x10 ea 10"x10 ea 10"x10 ea 10"x10 ea                                             Neuro Re-Ed                                                                                                        Ther Ex             bike  L1  5 min L1  5 min L1  5 min L1  5 min L1 5 min L1  5 min      Heel slides 5"x10 5"x15 5"x15 5"x15 5"x15 5"x15 5"x15      Quad sets 10"x10 5"x15 5"x15 5"x15 5"x15 5"x15 5"x15      Supine HA stretch with self OP 15"x3 15"x3 15"x3 15"x3 15"x3 15"x3 15"x3      SLR 1x10 3x10 3x10 3x12 3x12 3x15 1#  3x10      SL hip ABD 1x10 3x10 3x10 3x12 3x12 3x15 1#  3x10      clamshells   R/  3x10 R/  3x10 R/  3x15 R/  3x15 R/  3x15                   Ther Activity             STS: avoid valgus  1x10 1x15 1x15 2x10 2x10 1x10      Step-ups: avoid valgus  6"/  1x15 6"/  1x20 6"/  1x20 6"  1x20 6"/ 1x20 6"/  1x20                   Gait Training                                       Modalities

## 2023-10-09 ENCOUNTER — OFFICE VISIT (OUTPATIENT)
Dept: PHYSICAL THERAPY | Facility: CLINIC | Age: 78
End: 2023-10-09
Payer: MEDICARE

## 2023-10-09 DIAGNOSIS — M25.562 CHRONIC PAIN OF LEFT KNEE: ICD-10-CM

## 2023-10-09 DIAGNOSIS — M17.12 PRIMARY OSTEOARTHRITIS OF LEFT KNEE: Primary | ICD-10-CM

## 2023-10-09 DIAGNOSIS — G89.29 CHRONIC PAIN OF LEFT KNEE: ICD-10-CM

## 2023-10-09 PROCEDURE — 97110 THERAPEUTIC EXERCISES: CPT

## 2023-10-09 PROCEDURE — 97530 THERAPEUTIC ACTIVITIES: CPT

## 2023-10-09 NOTE — PROGRESS NOTES
Daily Note     Today's date: 10/9/2023  Patient name: Anita Garcia  : 1945  MRN: [id number]  Referring provider: Denys Sepulveda MD  Dx:   Encounter Diagnosis     ICD-10-CM    1. Primary osteoarthritis of left knee  M17.12       2. Chronic pain of left knee  M25.562     G89.29           Start Time: 1600  Stop Time: 1640  Total time in clinic (min): 40 minutes    Subjective: Patient reports pressure/dull ache when in weight bearing. 30-60 minutes of walking/standing tolerance. Objective: See treatment diary below  L knee PROM: 4-125°    Assessment: Patient demonstrates good form t/o therapy with appropriate fatigue noted. His PROM hasn't changed much in a few weeks but has been consistent. Plan: Cont. POC. Precautions:  PMHx: prostate CA  Dx: L knee OA    Manuals 9/6 9/11 9/15 9/18 9/21 10/2 10/6 10/9     L knee PROM  10"x10 ea 10"x10 ea 10"x10 ea 10"x10 ea 10"x10 ea 10"x10 ea 10"x10 ea                                            Neuro Re-Ed                                                                                                        Ther Ex             bike  L1  5 min L1  5 min L1  5 min L1  5 min L1 5 min L1  5 min L1  5 min     Heel slides 5"x10 5"x15 5"x15 5"x15 5"x15 5"x15 5"x15 5"x15     Quad sets 10"x10 5"x15 5"x15 5"x15 5"x15 5"x15 5"x15 5"x15     Supine HA stretch with self OP 15"x3 15"x3 15"x3 15"x3 15"x3 15"x3 15"x3 15"x3     SLR 1x10 3x10 3x10 3x12 3x12 3x15 1#  3x10 1#  3x10     SL hip ABD 1x10 3x10 3x10 3x12 3x12 3x15 1#  3x10 1#  3x10     clamshells   R/  3x10 R/  3x10 R/  3x15 R/  3x15 R/  3x15 R/  3x15                  Ther Activity             STS: avoid valgus  1x10 1x15 1x15 2x10 2x10 1x10 1x10     Step-ups: avoid valgus  6"/  1x15 6"/  1x20 6"/  1x20 6"  1x20 6"/ 1x20 6"/  1x20 6"  1x20                  Gait Training                                       Modalities

## 2023-10-10 ENCOUNTER — APPOINTMENT (OUTPATIENT)
Dept: PHYSICAL THERAPY | Facility: CLINIC | Age: 78
End: 2023-10-10
Payer: MEDICARE

## 2023-10-13 ENCOUNTER — EVALUATION (OUTPATIENT)
Dept: PHYSICAL THERAPY | Facility: CLINIC | Age: 78
End: 2023-10-13
Payer: MEDICARE

## 2023-10-13 DIAGNOSIS — M17.12 PRIMARY OSTEOARTHRITIS OF LEFT KNEE: Primary | ICD-10-CM

## 2023-10-13 DIAGNOSIS — G89.29 CHRONIC PAIN OF LEFT KNEE: ICD-10-CM

## 2023-10-13 DIAGNOSIS — M25.562 CHRONIC PAIN OF LEFT KNEE: ICD-10-CM

## 2023-10-13 PROCEDURE — 97530 THERAPEUTIC ACTIVITIES: CPT | Performed by: PHYSICAL THERAPIST

## 2023-10-13 PROCEDURE — 97110 THERAPEUTIC EXERCISES: CPT | Performed by: PHYSICAL THERAPIST

## 2023-10-13 NOTE — PROGRESS NOTES
Daily Note     Today's date: 10/13/2023  Patient name: Scout Dalton  : 1945  MRN: [id number]  Referring provider: Reji White MD  Dx:   Encounter Diagnosis     ICD-10-CM    1. Primary osteoarthritis of left knee  M17.12       2. Chronic pain of left knee  M25.562     G89.29                      Subjective: Pt reports a 30-minute tolerance to WB activities and improved LLE strength upon initial WB from STS. Objective: See treatment diary below  PROM: 5-122 deg  Updated HEP    Assessment: Pt demonstrates improved PROM, strength, and WB tolerance. He is appropriate for d/c from skilled PT services to a maintenance HEP. Plan: D/C to HEP. Precautions:  PMHx: prostate CA  Dx: L knee OA    Manuals 9/6 9/11 9/15 9/18 9/21 10/2 10/6 10/9 10/13    L knee PROM  10"x10 ea 10"x10 ea 10"x10 ea 10"x10 ea 10"x10 ea 10"x10 ea 10"x10 ea 10"x10 ea                                           Neuro Re-Ed                                                                                                        Ther Ex             bike  L1  5 min L1  5 min L1  5 min L1  5 min L1 5 min L1  5 min L1  5 min L1  5 min    Heel slides 5"x10 5"x15 5"x15 5"x15 5"x15 5"x15 5"x15 5"x15 5"x15    Quad sets 10"x10 5"x15 5"x15 5"x15 5"x15 5"x15 5"x15 5"x15 5"x15    Supine HA stretch with self OP 15"x3 15"x3 15"x3 15"x3 15"x3 15"x3 15"x3 15"x3 15"x3    SLR 1x10 3x10 3x10 3x12 3x12 3x15 1#  3x10 1#  3x10 Y/  3x10    SL hip ABD 1x10 3x10 3x10 3x12 3x12 3x15 1#  3x10 1#  3x10 Y/  3x10    clamshells   R/  3x10 R/  3x10 R/  3x15 R/  3x15 R/  3x15 R/  3x15 G  3x10                 Ther Activity             STS: avoid valgus  1x10 1x15 1x15 2x10 2x10 1x10 1x10 1x20    Step-ups: avoid valgus  6"/  1x15 6"/  1x20 6"/  1x20 6"  1x20 6"/ 1x20 6"/  1x20 6"  1x20 6"/  1x10  8"/  1x10                 Gait Training                                       Modalities

## 2023-10-16 ENCOUNTER — APPOINTMENT (OUTPATIENT)
Dept: LAB | Facility: CLINIC | Age: 78
End: 2023-10-16
Payer: MEDICARE

## 2023-10-16 DIAGNOSIS — C61 PROSTATE CANCER (HCC): ICD-10-CM

## 2023-10-16 LAB — PSA SERPL-MCNC: 6.48 NG/ML (ref 0–4)

## 2023-10-16 PROCEDURE — 36415 COLL VENOUS BLD VENIPUNCTURE: CPT

## 2023-10-16 PROCEDURE — 84153 ASSAY OF PSA TOTAL: CPT

## 2023-10-20 ENCOUNTER — TELEPHONE (OUTPATIENT)
Age: 78
End: 2023-10-20

## 2023-10-20 NOTE — TELEPHONE ENCOUNTER
Caller: João Peña    Doctor: Torsten Reddy     Reason for call: Was in to see you in Sept and Knee surgery was discussed. HE would like to know if he can get started with scheduling that, if possible wants to now if it can be in December.     Call back#: [phone]

## 2023-10-25 ENCOUNTER — OFFICE VISIT (OUTPATIENT)
Dept: UROLOGY | Facility: AMBULATORY SURGERY CENTER | Age: 78
End: 2023-10-25

## 2023-10-25 VITALS
WEIGHT: 186 LBS | HEIGHT: 70 IN | BODY MASS INDEX: 26.63 KG/M2 | SYSTOLIC BLOOD PRESSURE: 130 MMHG | DIASTOLIC BLOOD PRESSURE: 74 MMHG | OXYGEN SATURATION: 98 % | HEART RATE: 104 BPM

## 2023-10-25 DIAGNOSIS — C61 PROSTATE CANCER (HCC): Primary | ICD-10-CM

## 2023-10-25 NOTE — PROGRESS NOTES
Office Visit- Urology  Brianna Menendez 1945 MRN: [id number]      Assessment/Discussion/Plan    69 y.o. male managed by      Zaheer 6/7 Prostate Cancer on active surveillance  -Initial biopsy in 2019 was negative for prostate cancer  -MRI fusion prostate biopsy in April 2021 revealed [place] 6 prostate cancer at the target lesion as well as a core of 7 (3+4) prostate cancer. His most recent prostate biopsy in June 2022 did not demonstrate prostate cancer. Patient is currently on active surveillance.  -[employer] genomic testing revealed a 2% risk of 10-year disease specific mortality as well as a less than 1% chance of metastatic disease with primary treatment at 10 years. -Multi parametric MRI of the prostate performed in April 2022 revealed the known clinically significant prostate cancer with no significant interval change  -PSA 6.2 12/2022 up from 5.9 in 4/2022. Peak PSA of 6.75 in December 2020.  - multiparametric MRI of the prostate in April 2023 which demonstrated stability in  radiographic features  -LEBRON today with global firmness of the prostate. No overt/discrete nodules felt-there was an area of perhaps increased induration on the upper aspect of the right lobe of the prostate but I did not feel that this was consistent with an overt nodule.  -With stability in multiparametric imaging and overall stability in PSA I believe that is reasonable to continue with PSA surveillance. Plan for repeat PSA in 6 months.   If significant elevation of PSA would consider obtainment of fourth prostate biopsy for MRI fusion transperineal approach      Chief Complaint:   Trent Wyatt is a 69 y.o. male presenting to the office for a follow up visit regarding prostate cancer on active surveillance        Subjective      Patient is a 78-year-old male with a history of elevated PSA which prompted a transrectal ultrasound-guided biopsy in 2019 which was negative for malignancy but which did demonstrate atypical cells which prompted a multi parametric MRI when his PSA chantal to 7. That first multi parametric MRI in 1/2021 revealed a PI-RADS 5 lesion in the anterior peripheral zone of the prostate apex. These findings prompted MRI fusion biopsy performed in 2021 by Dr. Ramírez Martinez which revealed 3 cores of [place] 3+3 disease as well as 1 core of Zaheer 3+4 disease. At that time the patient opted to proceed with active surveillance versus definitive treatment for his prostate cancer. Genomic testing at that time revealed a 2% risk of 10-year disease specific mortality as well as a less than 1% chance of metastatic disease with primary treatment of 10 years. Repeat MRI of the prostate performed in April 2022 revealed no significant interval change in the previously seen lesion at the prostatic apex in the anterior peripheral zone extending into the anterior transition zone. Repeat MRI fusion prostate biopsy in June 2022 with all cores negative for malignancy but 1 core revealing high-grade prostatic intraepithelial neoplasia. Returns today for a 6-month follow-up with PSA. PSA at last visit  when I saw him in January 2023 was 6.2 up from 5.9 that was measured in April 2022. Peak PSA per chart review was 6.75 in December 2020. Patient return to the office 3 months later with MRI prior which demonstrated stability of radiographic features of 1.6 cm lesion in the anterior peripheral zone. His PSA dropped to 5.7 from 6.2 so it was opted to continue with PSA surveillance. He presents to the office today for 6-month follow-up with no new complaints. He still urinary symptoms but not bothersome to the point of requiring pharmacotherapy.   Most recent PSA returned at 6.48 in October 2023        [address] [facility] Most Recent Value   [address]    How often have you had a sensation of not emptying your bladder completely after you finished urinating? 1 (P)     How often have you had to urinate again less than two hours after you finished urinating? 2 (P)     How often have you found you stopped and started again several times when you urinate? 1 (P)     How often have you found it difficult to postpone urination? 1 (P)     How often have you had a weak urinary stream? 5 (P)     How often have you had to push or strain to begin urination? 0 (P)     How many times did you most typically get up to urinate from the time you went to bed at night until the time you got up in the morning? 5 (P)     Quality of Life: If you were to spend the rest of your life with your urinary condition just the way it is now, how would you feel about that? 3 (P)     AUA SYMPTOM SCORE 15 (P)             ROS:   Review of Systems      Past Medical History  Past Medical History:   Diagnosis Date    Allergic Spring pollens - years ago    Arthritis 2018    Knee    Basal cell carcinoma of nose     removed 07/2015    BPH with elevated PSA 12/11/2018    Cancer ([address])     Hypercholesterolemia     Hyperlipidemia     Mitral valve disorder     Osteoarthritis     Seasonal allergies     resolved 01/21/2015       Past Surgical History  Past Surgical History:   Procedure Laterality Date    COLONOSCOPY      DENTAL SURGERY      1970's    KNEE SURGERY      OTHER SURGICAL HISTORY      cerumen removal 1st 05/26/2011;   2nd:  08/02/2012    ID BX PROSTATE STRTCTC SATURATION SAMPLING IMG GID N/A 6/8/2022    Procedure: TRANSPERINEAL MRI FUSION BIOPSY PROSTATE;  Surgeon: Camila Ramirez MD;  Location[facility] MA[facility];  Service: Urology    ID PROSTATE NEEDLE BIOPSY ANY APPROACH N/A 4/6/2021    Procedure: TRANSPERINEAL MRI FUSION PROSTATE BIOPSY;  Surgeon: Zeynep Bolivar MD;  [facility];  Service: Urology    PROSTATE BIOPSY         Past Family History  Family History   Problem Relation Age of Onset    Alcohol abuse Father         independently diagnosed     Heart disease Father     Cancer Father     Hypertension Mother     COPD Mother     Diabetes type II Mother Hypertension Maternal Grandmother     Coronary artery disease Maternal Grandmother     Diabetes Maternal Grandmother     Lung cancer Maternal Grandmother     Coronary artery disease Family     Stroke Paternal Grandmother     Lung cancer Maternal Grandfather        Past Social history  Social History     Socioeconomic History    Marital status: /Civil Union     Spouse name: Not on file    Number of children: Not on file    Years of education: Not on file    Highest education level: Not on file   Occupational History    Occupation:    Tobacco Use    Smoking status: Never    Smokeless tobacco: Never   Vaping Use    Vaping Use: Never used   Substance and Sexual Activity    Alcohol use: Yes     Comment: Less than one drink per week    Drug use: No    Sexual activity: Not on file   Other Topics Concern    Not on file   Social History Narrative    Advance direct of file     Caffeine use -  He admits to consuming caffeine via coffee; 2-3 cups per week; and tea 2 cups per day     Uses safety belts      Social Determinants of Health     Financial Resource Strain: Low Risk  (12/14/2022)    Overall Financial Resource Strain (CARDIA)     Difficulty of Paying Living Expenses: Not hard at all   Food Insecurity: Not on file   Transportation Needs: No Transportation Needs (12/14/2022)    PRAPARE - Transportation     Lack of Transportation (Medical): No     Lack of Transportation (Non-Medical):  No   Physical Activity: Not on file   Stress: Not on file   Social Connections: Not on file   Intimate Partner Violence: Not on file   Housing Stability: Not on file       Current Medications  Current Outpatient Medications   Medication Sig Dispense Refill    Ascorbic Acid (VITAMIN C ER) 500 MG CPCR Take 1 capsule by mouth daily      aspirin (ECOTRIN LOW STRENGTH) 81 mg EC tablet Take 81 mg by mouth daily      co-enzyme Q-10 100 mg capsule Take 100 mg by mouth daily      ezetimibe (ZETIA) 10 mg tablet Take 1 tablet (10 mg total) by mouth daily 90 tablet 1    Multiple Vitamins-Minerals (MULTIVITAMIN ADULT) TABS Take 1 tablet by mouth daily      Omega-3 Fatty Acids (FISH OIL) 1200 MG CAPS Take 2 capsules by mouth daily       Vitamin D, Cholecalciferol, 25 MCG (1000 UT) TABS in the morning      ketoconazole (NIZORAL) 2 % shampoo if needed (Patient not taking: Reported on 10/25/2023)       No current facility-administered medications for this visit. Allergies  No Known Allergies    OBJECTIVE    Vitals   Vitals:    10/25/23 1043   BP: 130/74   BP Location: Left arm   Patient Position: Sitting   Cuff Size: Large   Pulse: 104   SpO2: 98%   Weight: 84.4 kg (186 lb)   Height: 5' 10" (1.778 m)       PVR:    Physical Exam  Constitutional:       General: He is not in acute distress. Appearance: Normal appearance. He is normal weight. He is not ill-appearing or toxic-appearing. HENT:      Head: Normocephalic and atraumatic. Eyes:      Conjunctiva/sclera: Conjunctivae normal.   Cardiovascular:      Rate and Rhythm: Normal rate. Pulmonary:      Effort: Pulmonary effort is normal. No respiratory distress. Abdominal:      Tenderness: There is no right CVA tenderness or left CVA tenderness. Genitourinary:     Comments: On prostate exam evidence of a large prostate. Evidence of a large prostate. On the anterior superior aspect of the right lobule of the prostate there is increased induration but I do not feel that this is consistent with an overt nodule. Prostate is globally firm  Skin:     General: Skin is warm and dry. Neurological:      General: No focal deficit present. Mental Status: He is alert and oriented to person, place, and time. Cranial Nerves: No cranial nerve deficit. Psychiatric:         Mood and Affect: Mood normal.         Behavior: Behavior normal.         Thought Content:  Thought content normal.       Labs:     Lab Results   Component Value Date    PSA 6.48 (H) 10/16/2023    PSA 5.7 (H) 04/04/2023    PSA 6.2 (H) 12/28/2022    PSA 5.9 (H) 04/25/2022     Lab Results   Component Value Date    CREATININE 0.95 04/04/2023      No results found for: "HGBA1C"  Lab Results   Component Value Date    CALCIUM 8.9 04/04/2023    K 4.0 04/04/2023    CO2 30 04/04/2023     04/04/2023    BUN 15 04/04/2023    CREATININE 0.95 04/04/2023       I have personally reviewed all pertinent lab results and reviewed with patient    Imaging   Narrative & Impression   MULTIPARAMETRIC MRI OF THE PROSTATE WITH AND WITHOUT CONTRAST-WITH 3-D POSTPROCESSING. INDICATION:   C61: Malignant neoplasm of prostate. Prostate cancer surveillance     COMPARISON: MRI of the prostate April 2022, January 2021     PSA LEVEL: 5.7 ng/mL on April 4, 2023. PRIOR BIOPSY DATE: April 2021. BIOPSY RESULTS: Zaheer 6 and Zaheer 7. Right anterior target and right base. TECHNIQUE: The following pulse sequences were obtained:  Small field-of-view axial T1-weighted and multiplanar T2-weighted images; DWI axial and ADC map; large field of view axial T2 weighted images; T1w in-phase and opposed-phase axials of entire pelvis   and dynamic 3D T1w axial before and during IV contrast injection. CONTRAST:  Gadobutrol (Gadavist) 8 mL of Gadobutrol injection (SINGLE-DOSE)      TECHNICAL LIMITATIONS: None. FINDINGS:     PROSTATE:     Size: 5.8 x 5.2 x 6.1 cm = 92 cc. Post-biopsy hemorrhage:  None. Central gland enlargement (BPH): Marked. Focal lesions - localization as follows:     Lesion: 1       Size: 1.3 x 1.6 x 1.1 cm, series 5/24, series 4/20. Location: Anterior peripheral zone lesion, prostate apex extending into the anterior transitional zone. T2-weighted images: Score 5: Circumscribed, homogeneous moderate hypointense focus/mass greater than or equal to 1.5 cm in greatest dimension OR definite extraprostatic extension/invasive behavior.        Diffusion-weighted images: Score 5: Focal markedly hypointense on ADC and markedly hyperintense on high b-value DWI, but greater than or equal to 1.5 cm in greatest dimension or definite extraprostatic extension/invasive behavior. Dynamic post-contrast images: (+) Focal, earlier or contemporaneous with, enhancement of adjacent normal prostatic tissues; corresponds to a finding on T2-weighted and/or DWI. PI-RADS Assessment Category: 5, Very high (clinically significant cancer is highly likely to be present). Extra-prostatic extension (EPE): Broadly abuts capsule without visualized gross EPE. SEMINAL VESICLES: Unremarkable     Note: Clinically significant cancer is defined on pathology/histology as [place] score greater than or equal to 7, and/or volume of greater than or equal to 0.5 mL, and/or extraprostatic extension. URINARY BLADDER: Unremarkable. LYMPH NODES: No pelvic lymphadenopathy. BONES: No suspicious osseous lesion. Fat-containing inguinal hernias, larger on the left. IMPRESSION:     1. Known clinically significant prostate cancer. Stable 1.6 cm lesion in the anterior peripheral zone prostate apex extending to the transitional zone. 2. The lesion broadly abuts the capsule without visualized extraprostatic extension. No seminal vesicle invasion, pelvic lymphadenopathy, or pelvic osseous metastatic disease. 3. Calculated prostate volume of 92 cc. Prostate gland boundaries and areas of concern for significant prostate cancer were segmented using 3D advanced post-processing on an independent [employer] system workstation with active physician participation. The segmentation was performed should   MR-ultrasound fusion biopsy be required. Workstation performed: [id number]       Terry Hinojosa PA-C  Date: 10/25/2023 Time: 10:58 AM  [facility]    This note was written using fluency dictation software. Please excuse any resulting minor grammatical errors.

## 2023-10-31 ENCOUNTER — OFFICE VISIT (OUTPATIENT)
Dept: OBGYN CLINIC | Facility: CLINIC | Age: 78
End: 2023-10-31
Payer: MEDICARE

## 2023-10-31 VITALS — BODY MASS INDEX: 26.77 KG/M2 | OXYGEN SATURATION: 97 % | HEIGHT: 70 IN | WEIGHT: 187 LBS | HEART RATE: 94 BPM

## 2023-10-31 DIAGNOSIS — M17.12 PRIMARY OSTEOARTHRITIS OF LEFT KNEE: Primary | ICD-10-CM

## 2023-10-31 DIAGNOSIS — Z01.818 PREOP TESTING: ICD-10-CM

## 2023-10-31 PROCEDURE — 99214 OFFICE O/P EST MOD 30 MIN: CPT | Performed by: ORTHOPAEDIC SURGERY

## 2023-10-31 RX ORDER — SODIUM CHLORIDE, SODIUM LACTATE, POTASSIUM CHLORIDE, CALCIUM CHLORIDE 600; 310; 30; 20 MG/100ML; MG/100ML; MG/100ML; MG/100ML
125 INJECTION, SOLUTION INTRAVENOUS CONTINUOUS
OUTPATIENT
Start: 2023-10-31

## 2023-10-31 RX ORDER — ASCORBIC ACID 500 MG
500 TABLET ORAL 2 TIMES DAILY
Qty: 60 TABLET | Refills: 1 | Status: SHIPPED | OUTPATIENT
Start: 2023-10-31

## 2023-10-31 RX ORDER — FERROUS SULFATE 324(65)MG
324 TABLET, DELAYED RELEASE (ENTERIC COATED) ORAL
Qty: 60 TABLET | Refills: 1 | Status: SHIPPED | OUTPATIENT
Start: 2023-10-31

## 2023-10-31 RX ORDER — CHLORHEXIDINE GLUCONATE ORAL RINSE 1.2 MG/ML
15 SOLUTION DENTAL ONCE
OUTPATIENT
Start: 2023-10-31 | End: 2023-10-31

## 2023-10-31 RX ORDER — ACETAMINOPHEN 325 MG/1
975 TABLET ORAL ONCE
OUTPATIENT
Start: 2023-10-31 | End: 2023-10-31

## 2023-10-31 RX ORDER — CHLORHEXIDINE GLUCONATE 4 G/100ML
SOLUTION TOPICAL DAILY PRN
OUTPATIENT
Start: 2023-10-31

## 2023-10-31 RX ORDER — FOLIC ACID 1 MG/1
1 TABLET ORAL DAILY
Qty: 30 TABLET | Refills: 1 | Status: SHIPPED | OUTPATIENT
Start: 2023-10-31

## 2023-10-31 NOTE — PROGRESS NOTES
Assessment:   Diagnosis ICD-10-CM Associated Orders   1. Primary osteoarthritis of left knee  M17.12           Plan:  Xrays of the left knee were reviewed  On exam, he has a 20 degree valgus deformity with lateral compartment OA. With his course of physical therapy has improved his motion. Prephysical therapy his motion was 12 degrees of extension- 117 degrees of flexion, post therapy 5 degrees of extension- 122 degrees flexion. The benefits and purpose of the operations and/or procedure have been explained to me in language I understand by Dr. Ulloa Reinholds well as the risks and benefits, alternatives and complications pertaining to the above procedure/surgery which include but is not limited to: infections, deeps vein thrombosis, blood clots to the lungs, wound healing problems, complications from extensive blood loss, including shock, possible death, continued pain, fracture, vascular or nerve injury, potential need for further surgery/revision, persistent pain/stiffness/instability, failure of hardware,heart attack, stroke and not obtaining results patient used. Including peroneal nerve palsy that would cause inability to dorsiflex his ankle due to the current alignment stretching the nerve  A Left total knee arthroplasty same -day at [facility]   Patient will have aspirin 81 mg twice daily for 5 weeks postoperatively  Discussed his prostate cancer diagnosis and came to the conclusion that baby aspirin 81mg bid for 5 weeks is sufficient enough   Patient is on active surveillance for Zaheer 6-7 prostate cancer  He will be in physical therapy post op. At 3 month palak the goal is no ambulatory devices       To do next visit:  Return for Follow up post- op. The above stated was discussed in layman's terms and the patient expressed understanding. All questions were answered to the patient's satisfaction.        Scribe Attestation      I,:  Alex Goldmann am acting as a scribe while in the presence of the attending physician.:       I,:  Yelena Galvan MD personally performed the services described in this documentation    as scribed in my presence.:               Subjective:   Brianna Menendez is a 69 y.o. male who presents today for 3-month follow-up for his left knee pain due to osteoarthritis. At his last visit conservative treatments were discussed with the patient included localized cortisone injection, 9/1/2023 and formal physical therapy. Patient wished to proceed with both of these options. He reports that the injection did give him significant relief. Completed therapy and has transition to a home exercise program. He has benefited well from therapy increasing his ROM in both flexion and extension. He notes that the injection did well, but he is back to baseline. Patient is on active surveillance for Zaheer 6-7 prostate cancer. Review of systems negative unless otherwise specified in HPI  Review of Systems   Constitutional:  Negative for chills, fever and unexpected weight change. HENT:  Negative for hearing loss, nosebleeds and sore throat. Eyes:  Negative for pain, redness and visual disturbance. Respiratory:  Negative for cough, shortness of breath and wheezing. Cardiovascular:  Negative for chest pain, palpitations and leg swelling. Gastrointestinal:  Negative for abdominal pain, nausea and vomiting. Endocrine: Negative for polydipsia and polyuria. Genitourinary:  Negative for dysuria and hematuria. Musculoskeletal:  Positive for arthralgias. Skin:  Negative for rash and wound. Neurological:  Negative for dizziness, light-headedness and headaches. Psychiatric/Behavioral:  Negative for decreased concentration, dysphoric mood and suicidal ideas. The patient is not nervous/anxious.         Past Medical History:   Diagnosis Date    Allergic Spring pollens - years ago    Arthritis 2018    Knee    Basal cell carcinoma of nose     removed 07/2015    BPH with elevated PSA 12/11/2018    Cancer ([address] [facility])     Hypercholesterolemia     Hyperlipidemia     Mitral valve disorder     Osteoarthritis     Seasonal allergies     resolved 01/21/2015       Past Surgical History:   Procedure Laterality Date    COLONOSCOPY      DENTAL SURGERY      1970's    KNEE SURGERY      OTHER SURGICAL HISTORY      cerumen removal 1st 05/26/2011;   2nd:  08/02/2012    IN BX PROSTATE STRTCTC SATURATION SAMPLING IMG GID N/A 6/8/2022    Procedure: TRANSPERINEAL MRI FUSION BIOPSY PROSTATE;  Surgeon: Brittny Fletcher MD;  [facility];  Service: Urology    IN PROSTATE NEEDLE BIOPSY ANY APPROACH N/A 4/6/2021    Procedure: TRANSPERINEAL MRI FUSION PROSTATE BIOPSY;  Surgeon: Bianca Esqueda MD;  [facility];  Service: Urology    PROSTATE BIOPSY         Family History   Problem Relation Age of Onset    Alcohol abuse Father         independently diagnosed     Heart disease Father     Cancer Father     Hypertension Mother     COPD Mother     Diabetes type II Mother     Hypertension Maternal Grandmother     Coronary artery disease Maternal Grandmother     Diabetes Maternal Grandmother     Lung cancer Maternal Grandmother     Coronary artery disease Family     Stroke Paternal Grandmother     Lung cancer Maternal Grandfather        Social History     Occupational History    Occupation:    Tobacco Use    Smoking status: Never    Smokeless tobacco: Never   Vaping Use    Vaping Use: Never used   Substance and Sexual Activity    Alcohol use: Yes     Comment: Less than one drink per week    Drug use: No    Sexual activity: Not on file         Current Outpatient Medications:     Ascorbic Acid (VITAMIN C ER) 500 MG CPCR, Take 1 capsule by mouth daily, Disp: , Rfl:     aspirin (ECOTRIN LOW STRENGTH) 81 mg EC tablet, Take 81 mg by mouth daily, Disp: , Rfl:     co-enzyme Q-10 100 mg capsule, Take 100 mg by mouth daily, Disp: , Rfl:     ezetimibe (ZETIA) 10 mg tablet, Take 1 tablet (10 mg total) by mouth daily, Disp: 90 tablet, Rfl: 1    Multiple Vitamins-Minerals (MULTIVITAMIN ADULT) TABS, Take 1 tablet by mouth daily, Disp: , Rfl:     Omega-3 Fatty Acids (FISH OIL) 1200 MG CAPS, Take 2 capsules by mouth daily , Disp: , Rfl:     Vitamin D, Cholecalciferol, 25 MCG (1000 UT) TABS, in the morning, Disp: , Rfl:     ketoconazole (NIZORAL) 2 % shampoo, if needed (Patient not taking: Reported on 10/25/2023), Disp: , Rfl:     No Known Allergies         Vitals:    10/31/23 1407   Pulse: 94   SpO2: 97%       Body mass index is 26.83 kg/m². Wt Readings from Last 3 Encounters:   10/31/23 84.8 kg (187 lb)   10/25/23 84.4 kg (186 lb)   09/01/23 85.1 kg (187 lb 9.6 oz)       Objective:                    Left Knee Exam     Tenderness   The patient is experiencing tenderness in the medial joint line and lateral joint line. Range of Motion   Extension:  -5   Left knee flexion: 115. Tests   Varus: negative Valgus: negative  Drawer:  Anterior - negative         Other   Erythema: absent  Sensation: normal  Pulse: present  Swelling: none  Effusion: no effusion present    Comments:  Calf is soft and non tender    20 degree Valgus deformity             Diagnostics, reviewed and taken today if performed as documented:    None performed      The attending physician has personally reviewed the pertinent films in PACS and interpretation is as follows:  Xrays of the left knee was reviewed from 9/1/2023: Noting severe end stage OA of the lateral compartment of the knee with valgus alignment. Procedures, if performed today:    Procedures    None performed      Portions of the record may have been created with voice recognition software. Occasional wrong word or "sound a like" substitutions may have occurred due to the inherent limitations of voice recognition software. Read the chart carefully and recognize, using context, where substitutions have occurred.

## 2023-12-19 ENCOUNTER — OFFICE VISIT (OUTPATIENT)
Age: 78
End: 2023-12-19
Payer: MEDICARE

## 2023-12-19 VITALS
BODY MASS INDEX: 27 KG/M2 | WEIGHT: 188.6 LBS | TEMPERATURE: 97.9 F | HEIGHT: 70 IN | OXYGEN SATURATION: 98 % | SYSTOLIC BLOOD PRESSURE: 136 MMHG | HEART RATE: 64 BPM | DIASTOLIC BLOOD PRESSURE: 76 MMHG

## 2023-12-19 DIAGNOSIS — M17.12 PRIMARY OSTEOARTHRITIS OF LEFT KNEE: ICD-10-CM

## 2023-12-19 DIAGNOSIS — E78.00 HYPERCHOLESTEROLEMIA: ICD-10-CM

## 2023-12-19 DIAGNOSIS — Z01.818 PREOP TESTING: ICD-10-CM

## 2023-12-19 DIAGNOSIS — I10 BENIGN ESSENTIAL HTN: ICD-10-CM

## 2023-12-19 DIAGNOSIS — Z00.00 MEDICARE ANNUAL WELLNESS VISIT, SUBSEQUENT: Primary | ICD-10-CM

## 2023-12-19 PROCEDURE — 99214 OFFICE O/P EST MOD 30 MIN: CPT | Performed by: INTERNAL MEDICINE

## 2023-12-19 PROCEDURE — G0439 PPPS, SUBSEQ VISIT: HCPCS | Performed by: INTERNAL MEDICINE

## 2023-12-19 RX ORDER — EZETIMIBE 10 MG/1
10 TABLET ORAL DAILY
Qty: 90 TABLET | Refills: 1 | Status: SHIPPED | OUTPATIENT
Start: 2023-12-19 | End: 2023-12-19 | Stop reason: SDUPTHER

## 2023-12-19 RX ORDER — EZETIMIBE 10 MG/1
10 TABLET ORAL DAILY
Qty: 90 TABLET | Refills: 1 | Status: SHIPPED | OUTPATIENT
Start: 2023-12-19 | End: 2024-06-16

## 2023-12-19 NOTE — PATIENT INSTRUCTIONS
Medicare Preventive Visit Patient Instructions  Thank you for completing your Welcome to Medicare Visit or Medicare Annual Wellness Visit today. Your next wellness visit will be due in one year (12/19/2024).  The screening/preventive services that you may require over the next 5-10 years are detailed below. Some tests may not apply to you based off risk factors and/or age. Screening tests ordered at today's visit but not completed yet may show as past due. Also, please note that scanned in results may not display below.  Preventive Screenings:  Service Recommendations Previous Testing/Comments   Colorectal Cancer Screening  Colonoscopy    Fecal Occult Blood Test (FOBT)/Fecal Immunochemical Test (FIT)  Fecal DNA/Cologuard Test  Flexible Sigmoidoscopy Age: 45-75 years old   Colonoscopy: every 10 years (May be performed more frequently if at higher risk)  OR  FOBT/FIT: every 1 year  OR  Cologuard: every 3 years  OR  Sigmoidoscopy: every 5 years  Screening may be recommended earlier than age 45 if at higher risk for colorectal cancer. Also, an individualized decision between you and your healthcare provider will decide whether screening between the ages of 76-85 would be appropriate. Colonoscopy: 08/02/2019  FOBT/FIT: Not on file  Cologuard: Not on file  Sigmoidoscopy: Not on file          Prostate Cancer Screening Individualized decision between patient and health care provider in men between ages of 55-69   Medicare will cover every 12 months beginning on the day after your 50th birthday PSA: 6.48 ng/mL     History Prostate Cancer  Screening Not Indicated     Hepatitis C Screening Once for adults born between 1945 and 1965  More frequently in patients at high risk for Hepatitis C Hep C Antibody: 11/26/2019    Screening Current   Diabetes Screening 1-2 times per year if you're at risk for diabetes or have pre-diabetes Fasting glucose: 94 mg/dL (4/4/2023)  A1C: No results in last 5 years (No results in last 5  years)  Screening Current   Cholesterol Screening Once every 5 years if you don't have a lipid disorder. May order more often based on risk factors. Lipid panel: 12/12/2023  Screening Not Indicated  History Lipid Disorder      Other Preventive Screenings Covered by Medicare:  Abdominal Aortic Aneurysm (AAA) Screening: covered once if your at risk. You're considered to be at risk if you have a family history of AAA or a male between the age of 65-75 who smoking at least 100 cigarettes in your lifetime.  Lung Cancer Screening: covers low dose CT scan once per year if you meet all of the following conditions: (1) Age 55-77; (2) No signs or symptoms of lung cancer; (3) Current smoker or have quit smoking within the last 15 years; (4) You have a tobacco smoking history of at least 20 pack years (packs per day x number of years you smoked); (5) You get a written order from a healthcare provider.  Glaucoma Screening: covered annually if you're considered high risk: (1) You have diabetes OR (2) Family history of glaucoma OR (3)  aged 50 and older OR (4)  American aged 65 and older  Osteoporosis Screening: covered every 2 years if you meet one of the following conditions: (1) Have a vertebral abnormality; (2) On glucocorticoid therapy for more than 3 months; (3) Have primary hyperparathyroidism; (4) On osteoporosis medications and need to assess response to drug therapy.  HIV Screening: covered annually if you're between the age of 15-65. Also covered annually if you are younger than 15 and older than 65 with risk factors for HIV infection. For pregnant patients, it is covered up to 3 times per pregnancy.    Immunizations:  Immunization Recommendations   Influenza Vaccine Annual influenza vaccination during flu season is recommended for all persons aged >= 6 months who do not have contraindications   Pneumococcal Vaccine   * Pneumococcal conjugate vaccine = PCV13 (Prevnar 13), PCV15 (Vaxneuvance),  PCV20 (Prevnar 20)  * Pneumococcal polysaccharide vaccine = PPSV23 (Pneumovax) Adults 19-63 yo with certain risk factors or if 65+ yo  If never received any pneumonia vaccine: recommend Prevnar 20 (PCV20)  Give PCV20 if previously received 1 dose of PCV13 or PPSV23   Hepatitis B Vaccine 3 dose series if at intermediate or high risk (ex: diabetes, end stage renal disease, liver disease)   Respiratory syncytial virus (RSV) Vaccine - COVERED BY MEDICARE PART D  * RSVPreF3 (Arexvy) CDC recommends that adults 60 years of age and older may receive a single dose of RSV vaccine using shared clinical decision-making (SCDM)   Tetanus (Td) Vaccine - COST NOT COVERED BY MEDICARE PART B Following completion of primary series, a booster dose should be given every 10 years to maintain immunity against tetanus. Td may also be given as tetanus wound prophylaxis.   Tdap Vaccine - COST NOT COVERED BY MEDICARE PART B Recommended at least once for all adults. For pregnant patients, recommended with each pregnancy.   Shingles Vaccine (Shingrix) - COST NOT COVERED BY MEDICARE PART B  2 shot series recommended in those 19 years and older who have or will have weakened immune systems or those 50 years and older     Health Maintenance Due:      Topic Date Due   • Hepatitis C Screening  Completed   • Colorectal Cancer Screening  Discontinued     Immunizations Due:      Topic Date Due   • COVID-19 Vaccine (5 - 2023-24 season) 09/01/2023     Advance Directives   What are advance directives?  Advance directives are legal documents that state your wishes and plans for medical care. These plans are made ahead of time in case you lose your ability to make decisions for yourself. Advance directives can apply to any medical decision, such as the treatments you want, and if you want to donate organs.   What are the types of advance directives?  There are many types of advance directives, and each state has rules about how to use them. You may choose a  combination of any of the following:  Living will:  This is a written record of the treatment you want. You can also choose which treatments you do not want, which to limit, and which to stop at a certain time. This includes surgery, medicine, IV fluid, and tube feedings.   Durable power of  for healthcare (DPAHC):  This is a written record that states who you want to make healthcare choices for you when you are unable to make them for yourself. This person, called a proxy, is usually a family member or a friend. You may choose more than 1 proxy.  Do not resuscitate (DNR) order:  A DNR order is used in case your heart stops beating or you stop breathing. It is a request not to have certain forms of treatment, such as CPR. A DNR order may be included in other types of advance directives.  Medical directive:  This covers the care that you want if you are in a coma, near death, or unable to make decisions for yourself. You can list the treatments you want for each condition. Treatment may include pain medicine, surgery, blood transfusions, dialysis, IV or tube feedings, and a ventilator (breathing machine).  Values history:  This document has questions about your views, beliefs, and how you feel and think about life. This information can help others choose the care that you would choose.  Why are advance directives important?  An advance directive helps you control your care. Although spoken wishes may be used, it is better to have your wishes written down. Spoken wishes can be misunderstood, or not followed. Treatments may be given even if you do not want them. An advance directive may make it easier for your family to make difficult choices about your care.   Weight Management   Why it is important to manage your weight:  Being overweight increases your risk of health conditions such as heart disease, high blood pressure, type 2 diabetes, and certain types of cancer. It can also increase your risk for  osteoarthritis, sleep apnea, and other respiratory problems. Aim for a slow, steady weight loss. Even a small amount of weight loss can lower your risk of health problems.  How to lose weight safely:  A safe and healthy way to lose weight is to eat fewer calories and get regular exercise. You can lose up about 1 pound a week by decreasing the number of calories you eat by 500 calories each day.   Healthy meal plan for weight management:  A healthy meal plan includes a variety of foods, contains fewer calories, and helps you stay healthy. A healthy meal plan includes the following:  Eat whole-grain foods more often.  A healthy meal plan should contain fiber. Fiber is the part of grains, fruits, and vegetables that is not broken down by your body. Whole-grain foods are healthy and provide extra fiber in your diet. Some examples of whole-grain foods are whole-wheat breads and pastas, oatmeal, brown rice, and bulgur.  Eat a variety of vegetables every day.  Include dark, leafy greens such as spinach, kale, cuauhtemoc greens, and mustard greens. Eat yellow and orange vegetables such as carrots, sweet potatoes, and winter squash.   Eat a variety of fruits every day.  Choose fresh or canned fruit (canned in its own juice or light syrup) instead of juice. Fruit juice has very little or no fiber.  Eat low-fat dairy foods.  Drink fat-free (skim) milk or 1% milk. Eat fat-free yogurt and low-fat cottage cheese. Try low-fat cheeses such as mozzarella and other reduced-fat cheeses.  Choose meat and other protein foods that are low in fat.  Choose beans or other legumes such as split peas or lentils. Choose fish, skinless poultry (chicken or turkey), or lean cuts of red meat (beef or pork). Before you cook meat or poultry, cut off any visible fat.   Use less fat and oil.  Try baking foods instead of frying them. Add less fat, such as margarine, sour cream, regular salad dressing and mayonnaise to foods. Eat fewer high-fat foods. Some  examples of high-fat foods include french fries, doughnuts, ice cream, and cakes.  Eat fewer sweets.  Limit foods and drinks that are high in sugar. This includes candy, cookies, regular soda, and sweetened drinks.  Exercise:  Exercise at least 30 minutes per day on most days of the week. Some examples of exercise include walking, biking, dancing, and swimming. You can also fit in more physical activity by taking the stairs instead of the elevator or parking farther away from stores. Ask your healthcare provider about the best exercise plan for you.      © Copyright [employer] 2018 Information is for End User's use only and may not be sold, redistributed or otherwise used for commercial purposes. All illustrations and images included in CareNotes® are the copyrighted property of [employer] or [employer]

## 2023-12-19 NOTE — PROGRESS NOTES
Assessment and Plan:     Problem List Items Addressed This Visit          Cardiovascular and Mediastinum    Benign essential HTN     Blood pressure is nicely controlled on repeat check blood pressure was 136/76         Relevant Orders    CBC and differential    Comprehensive metabolic panel    Lipid panel       Musculoskeletal and Integument    Primary osteoarthritis of left knee     Scheduled for total knee arthroplasty in April 2024         Relevant Orders    CBC and differential    Comprehensive metabolic panel       Other    Hypercholesterolemia     Lipids are at goal levels.         Relevant Medications    ezetimibe (ZETIA) 10 mg tablet    Other Relevant Orders    Lipid panel    Medicare annual wellness visit, subsequent - Primary     Other Visit Diagnoses       Preop testing                 Preventive health issues were discussed with patient, and age appropriate screening tests were ordered as noted in patient's After Visit Summary.  Personalized health advice and appropriate referrals for health education or preventive services given if needed, as noted in patient's After Visit Summary.     History of Present Illness:     Patient presents for a Medicare Wellness Visit    Patient presents to the office for 6-month follow-up visit along with a Medicare wellness visit.  He has no major complaints other than his chronic arthritis in his left knee.  He plans to have total knee arthroplasty this coming April.  He has some commitments that he would like to fulfill before he has his surgery.  Otherwise he has no major issues or concerns.  He has seen orthopedic surgery in knee replacement surgery as scheduled.  He did receive an intra-articular steroid injection which was helpful for approximately 4 to 6 weeks for his arthritic pain returned.  Otherwise he is having no issues.  His labs were reviewed and are all essentially within normal limits.  His CBC showed a very mild increase in his eosinophil count but  otherwise was normal.  CMP was normal.  And lipid profile is near goal levels.       Patient Care Team:  Jose Craig MD as PCP - General     Review of Systems:     Review of Systems   Constitutional: Negative.    HENT: Negative.  Negative for hearing loss (Intermittent issues with cerumen impaction).    Eyes: Negative.    Respiratory: Negative.     Cardiovascular: Negative.    Gastrointestinal: Negative.    Endocrine: Negative.    Genitourinary: Negative.    Musculoskeletal:  Positive for arthralgias (Left knee).   Allergic/Immunologic: Negative.    Neurological: Negative.    Hematological: Negative.    Psychiatric/Behavioral: Negative.          Problem List:     Patient Active Problem List   Diagnosis    Benign essential HTN    Prostate cancer (HCC)    Hypercholesterolemia    Bilateral impacted cerumen    Drug reaction    Primary osteoarthritis of left knee    Medicare annual wellness visit, subsequent    Sliding hiatal hernia    Dermatitis      Past Medical and Surgical History:     Past Medical History:   Diagnosis Date    Allergic Spring pollens - years ago    Arthritis 2018    Knee    Basal cell carcinoma of nose     removed 07/2015    BPH with elevated PSA 12/11/2018    Cancer (HCC)     Hypercholesterolemia     Hyperlipidemia     Mitral valve disorder     Osteoarthritis     Seasonal allergies     resolved 01/21/2015     Past Surgical History:   Procedure Laterality Date    COLONOSCOPY      DENTAL SURGERY      1970's    KNEE SURGERY      OTHER SURGICAL HISTORY      cerumen removal 1st 05/26/2011;   2nd:  08/02/2012    [place] BX PROSTATE STRTCTC SATURATION SAMPLING IMG GID N/A 6/8/2022    Procedure: TRANSPERINEAL MRI FUSION BIOPSY PROSTATE;  Surgeon: Vicente Kee MD;  Location: [facility];  Service: Urology    [place] PROSTATE NEEDLE BIOPSY ANY APPROACH N/A 4/6/2021    Procedure: TRANSPERINEAL MRI FUSION PROSTATE BIOPSY;  Surgeon: Castro Ortega MD;  Location: [facility];  Service: Urology    PROSTATE  BIOPSY        Family History:     Family History   Problem Relation Age of Onset    Alcohol abuse Father         independently diagnosed     Heart disease Father     Cancer Father     Hypertension Mother     COPD Mother     Diabetes type II Mother     Hypertension Maternal Grandmother     Coronary artery disease Maternal Grandmother     Diabetes Maternal Grandmother     Lung cancer Maternal Grandmother     Coronary artery disease Family     Stroke Paternal Grandmother     Lung cancer Maternal Grandfather       Social History:     Social History     Socioeconomic History    Marital status: /Civil Union     Spouse name: None    Number of children: None    Years of education: None    Highest education level: None   Occupational History    Occupation:    Tobacco Use    Smoking status: Never    Smokeless tobacco: Never   Vaping Use    Vaping status: Never Used   Substance and Sexual Activity    Alcohol use: Yes     Comment: Less than one drink per week    Drug use: No    Sexual activity: None   Other Topics Concern    None   Social History Narrative    Advance direct of file     Caffeine use -  He admits to consuming caffeine via coffee; 2-3 cups per week; and tea 2 cups per day     Uses safety belts      Social Determinants of Health     Financial Resource Strain: Low Risk  (12/18/2023)    Overall Financial Resource Strain (CARDIA)     Difficulty of Paying Living Expenses: Not hard at all   Food Insecurity: Not on file   Transportation Needs: No Transportation Needs (12/18/2023)    PRAPARE - Transportation     Lack of Transportation (Medical): No     Lack of Transportation (Non-Medical): No   Physical Activity: Not on file   Stress: Not on file   Social Connections: Not on file   Intimate Partner Violence: Not on file   Housing Stability: Not on file      Medications and Allergies:     Current Outpatient Medications   Medication Sig Dispense Refill    Ascorbic Acid (VITAMIN C ER) 500 MG CPCR Take 1  capsule by mouth daily      aspirin (ECOTRIN LOW STRENGTH) 81 mg EC tablet Take 81 mg by mouth daily      co-enzyme Q-10 100 mg capsule Take 100 mg by mouth daily      ezetimibe (ZETIA) 10 mg tablet Take 1 tablet (10 mg total) by mouth daily 90 tablet 1    ketoconazole (NIZORAL) 2 % shampoo if needed      Multiple Vitamins-Minerals (MULTIVITAMIN ADULT) TABS Take 1 tablet by mouth daily      Omega-3 Fatty Acids (FISH OIL) 1200 MG CAPS Take 2 capsules by mouth daily       Vitamin D, Cholecalciferol, 25 MCG (1000 UT) TABS in the morning      ascorbic acid (VITAMIN C) 500 MG tablet Take 1 tablet (500 mg total) by mouth 2 (two) times a day (Patient not taking: Reported on 12/19/2023) 60 tablet 1    ferrous sulfate 324 (65 Fe) mg Take 1 tablet (324 mg total) by mouth 2 (two) times a day before meals (Patient not taking: Reported on 12/19/2023) 60 tablet 1    folic acid (FOLVITE) 1 mg tablet Take 1 tablet (1 mg total) by mouth daily (Patient not taking: Reported on 12/19/2023) 30 tablet 1     No current facility-administered medications for this visit.     No Known Allergies   Immunizations:     Immunization History   Administered Date(s) Administered    COVID-19 MODERNA VACC 0.5 ML IM 01/24/2021, 02/20/2021, 11/04/2021, 09/23/2022    INFLUENZA 10/17/2014, 10/30/2015, 11/14/2016, 10/24/2018, 10/11/2021, 10/25/2023    Influenza Split High Dose Preservative Free IM 10/02/2017, 10/24/2018    Influenza, high dose seasonal 0.7 mL 10/03/2019, 10/15/2020, 10/27/2022    Influenza, seasonal, injectable 12/15/2011, 11/16/2012, 11/05/2014, 10/01/2015    Pneumococcal Conjugate 13-Valent 02/24/2016    Pneumococcal Polysaccharide PPV23 06/05/2014    Zoster 12/19/2013      Health Maintenance:         Topic Date Due    Hepatitis C Screening  Completed    Colorectal Cancer Screening  Discontinued         Topic Date Due    COVID-19 Vaccine (5 - 2023-24 season) 09/01/2023      Medicare Screening Tests and Risk Assessments:     Reji is here  for his Subsequent Wellness visit.     Health Risk Assessment:   Patient rates overall health as very good. Patient feels that their physical health rating is same. Patient is very satisfied with their life. Eyesight was rated as same. Hearing was rated as same. Patient feels that their emotional and mental health rating is same. Patients states they are never, rarely angry. Patient states they are sometimes unusually tired/fatigued. Pain experienced in the last 7 days has been some. Patient's pain rating has been 5/10. Patient states that he has experienced no weight loss or gain in last 6 months. Pain related to knee arthritis    Fall Risk Screening:   In the past year, patient has experienced: no history of falling in past year      Home Safety:  Patient does not have trouble with stairs inside or outside of their home. Patient has working smoke alarms and has working carbon monoxide detector. Home safety hazards include: none.     Nutrition:   Current diet is Regular.     Medications:   Patient is currently taking over-the-counter supplements. OTC medications include: see medication list. Patient is able to manage medications.     Activities of Daily Living (ADLs)/Instrumental Activities of Daily Living (IADLs):   Walk and transfer into and out of bed and chair?: Yes  Dress and groom yourself?: Yes    Bathe or shower yourself?: Yes    Feed yourself? Yes  Do your laundry/housekeeping?: Yes  Manage your money, pay your bills and track your expenses?: Yes  Make your own meals?: Yes    Do your own shopping?: Yes    Previous Hospitalizations:   Any hospitalizations or ED visits within the last 12 months?: No      Advance Care Planning:   Living will: Yes    Durable POA for healthcare: Yes    Advanced directive: Yes    Advanced directive counseling given: Yes    ACP document given: Yes    Patient declined ACP directive: Yes    End of Life Decisions reviewed with patient: Yes    Provider agrees with end of life  "decisions: Yes      Cognitive Screening:   Provider or family/friend/caregiver concerned regarding cognition?: No    PREVENTIVE SCREENINGS      Cardiovascular Screening:    General: Screening Not Indicated and History Lipid Disorder      Diabetes Screening:     General: Screening Current      Colorectal Cancer Screening:     General: Screening Current      Prostate Cancer Screening:    General: History Prostate Cancer and Screening Not Indicated      Osteoporosis Screening:    General: Screening Not Indicated      Abdominal Aortic Aneurysm (AAA) Screening:        General: Screening Not Indicated      Lung Cancer Screening:     General: Screening Not Indicated      Hepatitis C Screening:    General: Screening Current    Screening, Brief Intervention, and Referral to Treatment (SBIRT)    Screening  Typical number of drinks in a day: 3  Typical number of drinks in a week: 2  Interpretation: Low risk drinking behavior.    AUDIT-C Screenin) How often did you have a drink containing alcohol in the past year? 2 to 3 times a week  2) How many drinks did you have on a typical day when you were drinking in the past year? 1 to 2  3) How often did you have 6 or more drinks on one occasion in the past year? never    AUDIT-C Score: 3  Interpretation: Score 0-3 (male): Negative screen for alcohol misuse    Single Item Drug Screening:  How often have you used an illegal drug (including marijuana) or a prescription medication for non-medical reasons in the past year? never    Single Item Drug Screen Score: 0  Interpretation: Negative screen for possible drug use disorder    No results found.     Physical Exam:     /76   Pulse 64   Temp 97.9 °F (36.6 °C) (Temporal)   Ht 5' 10.2\" (1.783 m)   Wt 85.5 kg (188 lb 9.6 oz)   SpO2 98%   BMI 26.91 kg/m²     Physical Exam  Vitals reviewed.   Constitutional:       Appearance: He is well-developed and normal weight.   HENT:      Head: Normocephalic and atraumatic.      Right " Ear: Tympanic membrane, ear canal and external ear normal. There is no impacted cerumen (Non impacted cerumen present).      Left Ear: Tympanic membrane, ear canal and external ear normal. There is no impacted cerumen (Non impacted cerumen present).      Nose: Nose normal.      Mouth/Throat:      Mouth: Mucous membranes are moist.      Pharynx: Oropharynx is clear.   Eyes:      General: No scleral icterus.     Conjunctiva/sclera: Conjunctivae normal.      Pupils: Pupils are equal, round, and reactive to light.   Neck:      Vascular: No carotid bruit.   Cardiovascular:      Rate and Rhythm: Normal rate and regular rhythm.      Pulses: Normal pulses.      Heart sounds: Normal heart sounds. No murmur heard.  Pulmonary:      Effort: Pulmonary effort is normal. No respiratory distress.      Breath sounds: Normal breath sounds.   Abdominal:      General: Abdomen is flat. Bowel sounds are normal. There is no distension.      Palpations: Abdomen is soft. There is no mass.      Tenderness: There is no abdominal tenderness. There is no right CVA tenderness, left CVA tenderness, guarding or rebound.      Hernia: No hernia is present.   Musculoskeletal:         General: No swelling, tenderness or deformity. Normal range of motion.      Cervical back: Normal range of motion and neck supple.      Right lower leg: No edema.      Left lower leg: No edema.   Skin:     General: Skin is warm and dry.      Capillary Refill: Capillary refill takes less than 2 seconds.      Coloration: Skin is not jaundiced.      Findings: No bruising, erythema or rash.   Neurological:      General: No focal deficit present.      Mental Status: He is alert and oriented to person, place, and time. Mental status is at baseline.   Psychiatric:         Mood and Affect: Mood normal.         Behavior: Behavior normal.         Thought Content: Thought content normal.         Judgment: Judgment normal.          Jose Craig MD

## 2024-02-21 PROBLEM — Z00.00 MEDICARE ANNUAL WELLNESS VISIT, SUBSEQUENT: Status: RESOLVED | Noted: 2019-06-19 | Resolved: 2024-02-21

## 2024-03-07 ENCOUNTER — TELEPHONE (OUTPATIENT)
Age: 79
End: 2024-03-07

## 2024-03-07 NOTE — TELEPHONE ENCOUNTER
Patient called today stating that he's having knee replacement surgery on 4/22.    Pt is calling because he doesn't think he will be able to keep his 4/30 Urology appointment and it was canceled.    Attempt was made to accommodate on sooner date.     Pt is now scheduled to next avail on 9/25 at 2:20 PM and on the wait list.    Pt asked that a message be sent to provider to confirm that it is okay that he waits an additional 5 months to be seen?    If pt is accommodated to sooner appt prior to surgery, he can't do Wednesdays. Post op he is available on Wednesdays.    Please review.    Call back [phone]

## 2024-03-08 DIAGNOSIS — Z01.89 ENCOUNTER FOR GERIATRIC ASSESSMENT: Primary | ICD-10-CM

## 2024-03-08 NOTE — TELEPHONE ENCOUNTER
Flaco Santana PA-C  You6 minutes ago (10:46 AM)      Will determine appropriateness of September appointment once PSA is obtained.  Can keep for now.  If PSA significantly increases may need to be seen sooner

## 2024-03-21 ENCOUNTER — TELEPHONE (OUTPATIENT)
Dept: OBGYN CLINIC | Facility: HOSPITAL | Age: 79
End: 2024-03-21

## 2024-03-21 NOTE — TELEPHONE ENCOUNTER
Preoperative Elective Admission Assessment    Living Situation:    Who does pt live with: spouse  What kind of home: multi-level  How do they enter the home: garage  How many levels in home: 2 , will stay on first floor   # of steps to enter home: 2  # of steps to second floor: 12-14  Are there handrails: Yes  Are there landings: No  Sleeping arrangement: second floor  Where is Bathroom: first and second floor   Where is the tub or shower: first and second floor      First Floor Setup:   Is there a bathroom: Yes, tub/shower   Where would pt sleep: bed     DME:  Pt has BSC. DME ordered, RW 3/21/24. Instructed pt to bring RW on DOS, verbalizes understanding.   We discussed clearing pathways in the home and making sure there is accessibly to use the walker, for example, removing throw rugs.      Patient's Current Level of Function: Ambulates: Independently and ADLs: Independent    Post-op Caregiver: spouse  Caregiver Name and phone number for Inpatient discharge needs: Gaye Henry (Spouse)  [phone]   Currently receive any HHC/aides/community supports: No     Post-op Transport: spouse  To/from hospital: spouse  To/from PT 2-3x/week: spouse  Uses community transport now: No     Outpatient Physical Therapy Site:  Site: [facility]   pre and post-op appts scheduled? Yes     Medication Management: self  Preferred Pharmacy for Post-op Medications: [id number]  [place] - [address] [[id number]]   Blood Management Vitamin Regimen: Pt confirms taking as prescribed - pt will begin taking tomorrow.   Post-op anticoagulant: to be determined by surgical team postoperatively     DC Plan: Pt plans to be discharged home    Barriers to DC identified preoperatively: none identified    BMI: 26.91    Patient Education:  Pt educated on post-op pain, early mobilization (POD0), LOS goals, OP PT goals, and preoperative bathing. Patient educated that our goal is to appropriately discharge patient based off their  post-op function while striving to maintain maximal independence. The goal is to discharge patient to home and for them to attend outpatient physical therapy.    Assigned to care team? Yes

## 2024-03-22 LAB
DME PARACHUTE DELIVERY DATE ACTUAL: NORMAL
DME PARACHUTE DELIVERY DATE REQUESTED: NORMAL
DME PARACHUTE ITEM DESCRIPTION: NORMAL
DME PARACHUTE ORDER STATUS: NORMAL
DME PARACHUTE SUPPLIER NAME: NORMAL
DME PARACHUTE SUPPLIER PHONE: NORMAL

## 2024-03-28 ENCOUNTER — APPOINTMENT (OUTPATIENT)
Dept: LAB | Age: 79
End: 2024-03-28
Payer: MEDICARE

## 2024-03-28 DIAGNOSIS — C61 PROSTATE CANCER (HCC): ICD-10-CM

## 2024-03-28 DIAGNOSIS — Z01.818 PREOP TESTING: ICD-10-CM

## 2024-03-28 DIAGNOSIS — M17.12 PRIMARY OSTEOARTHRITIS OF LEFT KNEE: ICD-10-CM

## 2024-03-28 LAB
ALBUMIN SERPL BCP-MCNC: 4.1 G/DL (ref 3.5–5)
ALP SERPL-CCNC: 68 U/L (ref 34–104)
ALT SERPL W P-5'-P-CCNC: 25 U/L (ref 7–52)
ANION GAP SERPL CALCULATED.3IONS-SCNC: 9 MMOL/L (ref 4–13)
APTT PPP: 29 SECONDS (ref 23–37)
AST SERPL W P-5'-P-CCNC: 22 U/L (ref 13–39)
ATRIAL RATE: 79 BPM
BASOPHILS # BLD AUTO: 0.08 THOUSANDS/ÂΜL (ref 0–0.1)
BASOPHILS NFR BLD AUTO: 2 % (ref 0–1)
BILIRUB SERPL-MCNC: 0.75 MG/DL (ref 0.2–1)
BUN SERPL-MCNC: 14 MG/DL (ref 5–25)
CALCIUM SERPL-MCNC: 9.1 MG/DL (ref 8.4–10.2)
CHLORIDE SERPL-SCNC: 102 MMOL/L (ref 96–108)
CO2 SERPL-SCNC: 28 MMOL/L (ref 21–32)
CREAT SERPL-MCNC: 0.91 MG/DL (ref 0.6–1.3)
EOSINOPHIL # BLD AUTO: 0.54 THOUSAND/ÂΜL (ref 0–0.61)
EOSINOPHIL NFR BLD AUTO: 10 % (ref 0–6)
ERYTHROCYTE [DISTWIDTH] IN BLOOD BY AUTOMATED COUNT: 13.2 % (ref 11.6–15.1)
EST. AVERAGE GLUCOSE BLD GHB EST-MCNC: 103 MG/DL
GFR SERPL CREATININE-BSD FRML MDRD: 81 ML/MIN/1.73SQ M
GLUCOSE P FAST SERPL-MCNC: 91 MG/DL (ref 65–99)
HBA1C MFR BLD: 5.2 %
HCT VFR BLD AUTO: 47.6 % (ref 36.5–49.3)
HGB BLD-MCNC: 15.4 G/DL (ref 12–17)
IMM GRANULOCYTES # BLD AUTO: 0.01 THOUSAND/UL (ref 0–0.2)
IMM GRANULOCYTES NFR BLD AUTO: 0 % (ref 0–2)
INR PPP: 1.02 (ref 0.84–1.19)
LYMPHOCYTES # BLD AUTO: 1.21 THOUSANDS/ÂΜL (ref 0.6–4.47)
LYMPHOCYTES NFR BLD AUTO: 22 % (ref 14–44)
MCH RBC QN AUTO: 30.9 PG (ref 26.8–34.3)
MCHC RBC AUTO-ENTMCNC: 32.4 G/DL (ref 31.4–37.4)
MCV RBC AUTO: 96 FL (ref 82–98)
MONOCYTES # BLD AUTO: 0.49 THOUSAND/ÂΜL (ref 0.17–1.22)
MONOCYTES NFR BLD AUTO: 9 % (ref 4–12)
NEUTROPHILS # BLD AUTO: 3.08 THOUSANDS/ÂΜL (ref 1.85–7.62)
NEUTS SEG NFR BLD AUTO: 57 % (ref 43–75)
NRBC BLD AUTO-RTO: 0 /100 WBCS
P AXIS: 58 DEGREES
PLATELET # BLD AUTO: 191 THOUSANDS/UL (ref 149–390)
PMV BLD AUTO: 10 FL (ref 8.9–12.7)
POTASSIUM SERPL-SCNC: 4.6 MMOL/L (ref 3.5–5.3)
PR INTERVAL: 154 MS
PROT SERPL-MCNC: 6.7 G/DL (ref 6.4–8.4)
PROTHROMBIN TIME: 13.3 SECONDS (ref 11.6–14.5)
PSA SERPL-MCNC: 7.75 NG/ML (ref 0–4)
QRS AXIS: -2 DEGREES
QRSD INTERVAL: 80 MS
QT INTERVAL: 380 MS
QTC INTERVAL: 435 MS
RBC # BLD AUTO: 4.98 MILLION/UL (ref 3.88–5.62)
SODIUM SERPL-SCNC: 139 MMOL/L (ref 135–147)
T WAVE AXIS: 46 DEGREES
VENTRICULAR RATE: 79 BPM
WBC # BLD AUTO: 5.41 THOUSAND/UL (ref 4.31–10.16)

## 2024-03-28 PROCEDURE — 85610 PROTHROMBIN TIME: CPT

## 2024-03-28 PROCEDURE — 80053 COMPREHEN METABOLIC PANEL: CPT

## 2024-03-28 PROCEDURE — 85730 THROMBOPLASTIN TIME PARTIAL: CPT

## 2024-03-28 PROCEDURE — 85025 COMPLETE CBC W/AUTO DIFF WBC: CPT

## 2024-03-28 PROCEDURE — 83036 HEMOGLOBIN GLYCOSYLATED A1C: CPT

## 2024-03-28 PROCEDURE — 36415 COLL VENOUS BLD VENIPUNCTURE: CPT

## 2024-03-28 PROCEDURE — 93005 ELECTROCARDIOGRAM TRACING: CPT

## 2024-03-28 PROCEDURE — 93010 ELECTROCARDIOGRAM REPORT: CPT | Performed by: INTERNAL MEDICINE

## 2024-03-28 PROCEDURE — 84153 ASSAY OF PSA TOTAL: CPT

## 2024-04-01 NOTE — PRE-PROCEDURE INSTRUCTIONS
Pre-Surgery Instructions:   Medication Instructions    ascorbic acid (VITAMIN C) 500 MG tablet Hold day of surgery.    aspirin (ECOTRIN LOW STRENGTH) 81 mg EC tablet Stop taking 7 days prior to surgery.    co-enzyme Q-10 100 mg capsule Stop taking 7 days prior to surgery.    ezetimibe (ZETIA) 10 mg tablet Take night before surgery    ferrous sulfate 324 (65 Fe) mg Hold day of surgery.    folic acid (FOLVITE) 1 mg tablet Hold day of surgery.    ketoconazole (NIZORAL) 2 % shampoo Uses PRN- OK to take day of surgery    Multiple Vitamins-Minerals (MULTIVITAMIN ADULT) TABS Stop taking 7 days prior to surgery.    Omega-3 Fatty Acids (FISH OIL) 1200 MG CAPS Stop taking 7 days prior to surgery.    Vitamin D, Cholecalciferol, 25 MCG (1000 UT) TABS Stop taking 7 days prior to surgery.    Medication instructions for day surgery reviewed. Please use only a sip of water to take your instructed medications. Avoid aspirin and all over the counter vitamins, supplements and NSAIDS for one week prior to surgery per anesthesia guidelines. Tylenol is ok to take as needed. States aspirin is for general wellness only.      You will receive a call one business day prior to surgery with an arrival time and hospital directions. If your surgery is scheduled on a Monday, the hospital will be calling you on the Friday prior to your surgery. If you have not heard from anyone by 8pm, please call the hospital supervisor through the hospital  at [phone]. ([place] [phone] or [place] [phone]).    Do not eat or drink anything after midnight the night before your surgery, including candy, mints, lifesavers, or chewing gum. Do not drink alcohol 24hrs before your surgery. Try not to smoke at least 24hrs before your surgery.       Follow the pre surgery showering instructions as listed in the “My Surgical Experience Booklet” or otherwise provided by your surgeon's office. Do not use a blade to shave the surgical area 1 week  before surgery. It is okay to use a clean electric clippers up to 24 hours before surgery. Do not apply any lotions, creams, including makeup, cologne, deodorant, or perfumes after showering on the day of your surgery. Do not use dry shampoo, hair spray, hair gel, or any type of hair products.     Ortho Class Content reviewed, verb understanding . Confirms DME has arrived and has raised toilet at home. Inst to f/u w/ Nurse Navigator w/ any questions or concerns-states has question about obtaining cane and will f/u with her. Inst to bring walker w/ him DOS.     No contact lenses, eye make-up, or artificial eyelashes. Remove nail polish, including gel polish, and any artificial, gel, or acrylic nails if possible. Remove all jewelry including rings and body piercing jewelry.     Wear causal clothing that is easy to take on and off. Consider your type of surgery.    Keep any valuables, jewelry, piercings at home. Please bring any specially ordered equipment (sling, braces) if indicated.    Arrange for a responsible person to drive you to and from the hospital on the day of your surgery. Please confirm the visitor policy for the day of your procedure when you receive your phone call with an arrival time.     Call the surgeon's office with any new illnesses, exposures, or additional questions prior to surgery.    Please reference your “My Surgical Experience Booklet” for additional information to prepare for your upcoming surgery.

## 2024-04-01 NOTE — PATIENT INSTRUCTIONS
BEFORE SURGERY    Contact surgical nurse  navigator with any questions regarding preoperative plan or schedule.  Stop all over the counter supplements, herbal, naturopathic  medications for 1 week prior to surgery UNLESS prescribed by your surgeon  Hold NSAIDS (i.e. advil, alleve, motrin, ibuprofen, celebrex) minimum 7 days prior to surgery  Hold Asprin minimum 7 days prior to surgery unless instructed otherwise by your physician  Follow presurgical medication instructions provided by preadmission nursing team reviewed during your presurgery phone call  Strategies for optimizing your surgery through breathing exercises, nutrition and physical activity can be found at [email or web address]  Call [phone] with any presurgical concerns or medications questions    AFTER SURGERY    Recommend using Tylenol ( acetaminophen ) 1000 mg every eight hours during the first week post discharge along with icing the area for 20 mins every 3-4 hours while awake can be helpful in reducing your need for post operative opioid use. This opioid sparing plan can be used along side your surgeons pain plan.  Use bowel medicines prescribed by your surgeon (colace) daily post op during the first 1-2 weeks to avoid post operative constipation issues  If cleared by your surgical team for activity then early and often walking is encouraged and can be important in prevention of post surgical blood clots. Additionally spend as much time out of bed as possible and allowed by your surgical team  Use your incentive spirometer twice per hour in the first seven days after surgery to help prevent post surgery lung complications and infections  Call [phone] with any post discharge concerns or medical issues

## 2024-04-01 NOTE — H&P (VIEW-ONLY)
Internal Medicine Pre-Operative Evaluation:     Reason for Visit: Pre-operative Evaluation for Risk Stratification and Optimization    Patient ID: Reji Henry is a 78 y.o. male.     Surgery: Arthroplasty of left knee  Referring Provider: Dr Bernard      Recommendations to Proceed withSurgery    Patient is considered to be Low risk for Medium risk procedure.     After evaluation and discussion with patient with emphasis that all surgery has some degree of inherent risk it is acknowledged by patient this risk is acceptable.    Patient is optimized and may proceed with planned procedure.     Assessment    Pre-operative Medical Evaluation for planned surgery  Recommendations as listed in PLAN section below  Contact surgical nurse  navigator with any questions regarding preoperative plan or schedule.      Problem List Items Addressed This Visit          Cardiovascular and Mediastinum    Benign essential HTN     Stable  Monitor post operative BP   Avoid hypotension if at all possible  Refer to PAT instructions regarding medication administration the morning of surgery              Musculoskeletal and Integument    Primary osteoarthritis of left knee     Failed outpatient conservative measures  Electing to undergo arthroplasty              Genitourinary    Prostate cancer (HCC)     Other Visit Diagnoses       Preoperative clearance    -  Primary                 Plan:     1. Further preoperative workup as follows:   - none no further testing required may proceed with surgery    2. Preoperative Medication Management Review performed by PAT nursing  yes    3. Patient requires further consultation with:   No Consults Required    4. Discharge Planning / Barriers to Discharge  none identified - patients has post discharge therapy plan in place, transportation arranged for discharge day, adequate family support at home to assist with discharge to home.        Subjective:           History of Present Illness:     Reji AMARAL  "Carl is a 78 y.o. male who presents to the office today for a preoperative consultation at the request of surgeon. The patient understands this is an elective procedure and not emergent. They are electing to undergo planned procedure with an understanding that all surgery has inherent risk. They have worked with their surgeon and failed conservative treatment measures. Today they present for preoperative risk assessment and recommendations for optimization in preparation for surgery.    Pt seen in surgical optimization center for upcoming proposed surgery. They have failed previous conservative measures and have elected surgical intervention.     Pt meets presurgical lab and BMI optimization goals.    Upon interview questioning patient is able to perform greater than 4 METs workload in daily life without any reported cardio-pulmonary symptoms.          ROS: No TIA's or unusual headaches, no dysphagia.  No prolonged cough. No dyspnea or chest pain on exertion.  No abdominal pain, change in bowel habits, black or bloody stools.  No urinary tract or BPH symptoms.  Positive reported pain in arthritic joint. Positive difficulty with gait. No skin rashes or issues.      Objective:    /76   Pulse 96   Ht 5' 10\" (1.778 m)   Wt 85.6 kg (188 lb 12.8 oz)   SpO2 98%   BMI 27.09 kg/m²       General Appearance: no distress, conversive  HEENT: PERRLA, conjuctiva normal; oropharynx clear; mucous membranes moist;   Neck:  Supple, no lymphadenopathy or thyromegaly  Lungs: breath sounds normal, normal respiratory effort, no retractions, expiratory effort normal  CV: normal heart sounds S1/S2, PMI normal   ABD: soft non tender, no masses , no hepatic or splenomegaly  EXT: DP pulses intact, no lymphadenopathy, no edema  Skin: normal turgor, normal texture, no rash  Psych: affect normal, mood normal  Neuro: AAOx3        The following portions of the patient's history were reviewed and updated as appropriate: allergies, " current medications, past family history, past medical history, past social history, past surgical history and problem list.     Past History:       Past Medical History:   Diagnosis Date    Allergic Spring pollens - years ago    Arthritis 2018    Knee    Basal cell carcinoma of nose     removed 07/2015    BPH with elevated PSA 12/11/2018    Cancer (HCC)     prostate    Hiatal hernia     sliding -- states no GERD symptoms, takes no meds    Hypercholesterolemia     Hyperlipidemia     Mitral valve disorder     Osteoarthritis     Seasonal allergies     resolved 01/21/2015    Past Surgical History:   Procedure Laterality Date    COLONOSCOPY      DENTAL SURGERY      1970's    EGD      KNEE SURGERY      OTHER SURGICAL HISTORY      cerumen removal 1st 05/26/2011;   2nd:  08/02/2012    CT BX PROSTATE STRTCTC SATURATION SAMPLING IMG GID N/A 06/08/2022    Procedure: TRANSPERINEAL MRI FUSION BIOPSY PROSTATE;  Surgeon: Vicente Kee MD;  Location: [facility];  Service: Urology    CT PROSTATE NEEDLE BIOPSY ANY APPROACH N/A 04/06/2021    Procedure: TRANSPERINEAL MRI FUSION PROSTATE BIOPSY;  Surgeon: Castro Ortega MD;  Location: [facility];  Service: Urology    PROSTATE BIOPSY            Social History     Tobacco Use    Smoking status: Never    Smokeless tobacco: Never   Vaping Use    Vaping status: Never Used   Substance Use Topics    Alcohol use: Yes     Comment: Less than one drink per week    Drug use: No     Family History   Problem Relation Age of Onset    Alcohol abuse Father         independently diagnosed     Heart disease Father     Cancer Father     Hypertension Mother     COPD Mother     Diabetes type II Mother     Hypertension Maternal Grandmother     Coronary artery disease Maternal Grandmother     Diabetes Maternal Grandmother     Lung cancer Maternal Grandmother     Coronary artery disease Family     Stroke Paternal Grandmother     Lung cancer Maternal Grandfather           Allergies:     No Known  "Allergies     Current Medications:     Current Outpatient Medications   Medication Instructions    ascorbic acid (VITAMIN C) 500 mg, Oral, 2 times daily    aspirin (ECOTRIN LOW STRENGTH) 81 mg, Oral, Daily    co-enzyme Q-10 100 mg, Oral, Daily    ezetimibe (ZETIA) 10 mg, Oral, Daily    ferrous sulfate 324 mg, Oral, 2 times daily before meals    folic acid (FOLVITE) 1 mg, Oral, Daily    ketoconazole (NIZORAL) 2 % shampoo As needed    Multiple Vitamins-Minerals (MULTIVITAMIN ADULT) TABS 1 tablet, Oral, Daily    Omega-3 Fatty Acids (FISH OIL) 1200 MG CAPS 2 capsules, Oral, Daily    Vitamin D, Cholecalciferol, 25 MCG (1000 UT) TABS Daily           PRE-OP WORKSHEET DATA    Assessment of Pre-Operative Risks     MLJ Quality Hard Stops:  BMI (<40) : Estimated body mass index is 27.09 kg/m² as calculated from the following:    Height as of this encounter: 5' 10\" (1.778 m).    Weight as of this encounter: 85.6 kg (188 lb 12.8 oz).    Hgb ( >11):   Lab Results   Component Value Date    HGB 15.4 03/28/2024     HbA1c (<7.5) :   Lab Results   Component Value Date    HGBA1C 5.2 03/28/2024     GFR (>60) (Less then 45 = Nephrology consult):    Lab Results   Component Value Date    EGFR 81 03/28/2024         Active Decompensated Chronic Conditions which would delay surgery  No acutely decompensated medical issues such as recent CVA, MI, new onset arrhythmia, severe aortic stenosis, CHF, uncontrolled COPD       Functional capacity: Mowing lawn, Push mower  5-6 Mets  Pick the highest level patient can comfortably perform   (if less the 4 mets consider functional assessment via cardiac stress testing or consultation)    Assessment of intra and post operative respiratory, hemodynamic and thrombotic risks     Prior Anesthesia Reactions: No     Personal history of venous thromboembolic disease? No    History of steroid use > 5 mg for >2 weeks within last year? No      The patient's risk factors for cardiac complications include " :  none    Reji Henry has an IN HOSPITAL cardiac risk of RCI RISK CLASS I (0 risk factors, risk of major cardiac compl. appr. 0.5%) based on RCRI calculator    Cardiac Risk Estimation: per the Revised Cardiac Risk Index (Circ. 100:1043, 1999),        Pre-Op Data Reviewed:       Laboratory Results: I have personally reviewed the pertinent laboratory results/reports     EKG:I have personally reviewed pertinent reports.  . I personally reviewed and interpreted available tracings in the electronic medical record    Encounter Date: 03/28/24   EKG 12 lead   Result Value    Ventricular Rate 79    Atrial Rate 79    DE Interval 154    QRSD Interval 80    QT Interval 380    QTC Interval 435    P Axis 58    QRS Axis -2    T Wave Axis 46    Narrative    Normal sinus rhythm  Normal ECG  When compared with ECG of 27-MAY-2022 09:53,  No significant change was found  Confirmed by Brock Chambers ([id number]) on 3/28/2024 2:22:48 PM       OLD RECORDS: reviewed old records in the chart review section if EHR on day of visit.    Previous cardiopulmonary studies within the past year:  Echocardiogram: no  Cardiac Catheterization: no  Stress Test: no      Time of visit including pre-visit chart review, visit and post-visit coordination of plan and care , review of pre-surgical lab work, preparation and time spent documenting note in electronic medical record, time spent face-to-face in physical examination answering patient questions by care team 35 minutes             [facility]

## 2024-04-01 NOTE — PROGRESS NOTES
Internal Medicine Pre-Operative Evaluation:     Reason for Visit: Pre-operative Evaluation for Risk Stratification and Optimization    Patient ID: Reji Henry is a 78 y.o. male.     Surgery: Arthroplasty of left knee  Referring Provider: Dr Bernard      Recommendations to Proceed withSurgery    Patient is considered to be Low risk for Medium risk procedure.     After evaluation and discussion with patient with emphasis that all surgery has some degree of inherent risk it is acknowledged by patient this risk is acceptable.    Patient is optimized and may proceed with planned procedure.     Assessment    Pre-operative Medical Evaluation for planned surgery  Recommendations as listed in PLAN section below  Contact surgical nurse  navigator with any questions regarding preoperative plan or schedule.      Problem List Items Addressed This Visit          Cardiovascular and Mediastinum    Benign essential HTN     Stable  Monitor post operative BP   Avoid hypotension if at all possible  Refer to PAT instructions regarding medication administration the morning of surgery              Musculoskeletal and Integument    Primary osteoarthritis of left knee     Failed outpatient conservative measures  Electing to undergo arthroplasty              Genitourinary    Prostate cancer (HCC)     Other Visit Diagnoses       Preoperative clearance    -  Primary                 Plan:     1. Further preoperative workup as follows:   - none no further testing required may proceed with surgery    2. Preoperative Medication Management Review performed by PAT nursing  yes    3. Patient requires further consultation with:   No Consults Required    4. Discharge Planning / Barriers to Discharge  none identified - patients has post discharge therapy plan in place, transportation arranged for discharge day, adequate family support at home to assist with discharge to home.        Subjective:           History of Present Illness:     Reji AMARAL  "Carl is a 78 y.o. male who presents to the office today for a preoperative consultation at the request of surgeon. The patient understands this is an elective procedure and not emergent. They are electing to undergo planned procedure with an understanding that all surgery has inherent risk. They have worked with their surgeon and failed conservative treatment measures. Today they present for preoperative risk assessment and recommendations for optimization in preparation for surgery.    Pt seen in surgical optimization center for upcoming proposed surgery. They have failed previous conservative measures and have elected surgical intervention.     Pt meets presurgical lab and BMI optimization goals.    Upon interview questioning patient is able to perform greater than 4 METs workload in daily life without any reported cardio-pulmonary symptoms.          ROS: No TIA's or unusual headaches, no dysphagia.  No prolonged cough. No dyspnea or chest pain on exertion.  No abdominal pain, change in bowel habits, black or bloody stools.  No urinary tract or BPH symptoms.  Positive reported pain in arthritic joint. Positive difficulty with gait. No skin rashes or issues.      Objective:    /76   Pulse 96   Ht 5' 10\" (1.778 m)   Wt 85.6 kg (188 lb 12.8 oz)   SpO2 98%   BMI 27.09 kg/m²       General Appearance: no distress, conversive  HEENT: PERRLA, conjuctiva normal; oropharynx clear; mucous membranes moist;   Neck:  Supple, no lymphadenopathy or thyromegaly  Lungs: breath sounds normal, normal respiratory effort, no retractions, expiratory effort normal  CV: normal heart sounds S1/S2, PMI normal   ABD: soft non tender, no masses , no hepatic or splenomegaly  EXT: DP pulses intact, no lymphadenopathy, no edema  Skin: normal turgor, normal texture, no rash  Psych: affect normal, mood normal  Neuro: AAOx3        The following portions of the patient's history were reviewed and updated as appropriate: allergies, " current medications, past family history, past medical history, past social history, past surgical history and problem list.     Past History:       Past Medical History:   Diagnosis Date    Allergic Spring pollens - years ago    Arthritis 2018    Knee    Basal cell carcinoma of nose     removed 07/2015    BPH with elevated PSA 12/11/2018    Cancer (HCC)     prostate    Hiatal hernia     sliding -- states no GERD symptoms, takes no meds    Hypercholesterolemia     Hyperlipidemia     Mitral valve disorder     Osteoarthritis     Seasonal allergies     resolved 01/21/2015    Past Surgical History:   Procedure Laterality Date    COLONOSCOPY      DENTAL SURGERY      1970's    EGD      KNEE SURGERY      OTHER SURGICAL HISTORY      cerumen removal 1st 05/26/2011;   2nd:  08/02/2012    DE BX PROSTATE STRTCTC SATURATION SAMPLING IMG GID N/A 06/08/2022    Procedure: TRANSPERINEAL MRI FUSION BIOPSY PROSTATE;  Surgeon: Vicente Kee MD;  Location: [facility];  Service: Urology    DE PROSTATE NEEDLE BIOPSY ANY APPROACH N/A 04/06/2021    Procedure: TRANSPERINEAL MRI FUSION PROSTATE BIOPSY;  Surgeon: Castro Ortega MD;  Location: [facility];  Service: Urology    PROSTATE BIOPSY            Social History     Tobacco Use    Smoking status: Never    Smokeless tobacco: Never   Vaping Use    Vaping status: Never Used   Substance Use Topics    Alcohol use: Yes     Comment: Less than one drink per week    Drug use: No     Family History   Problem Relation Age of Onset    Alcohol abuse Father         independently diagnosed     Heart disease Father     Cancer Father     Hypertension Mother     COPD Mother     Diabetes type II Mother     Hypertension Maternal Grandmother     Coronary artery disease Maternal Grandmother     Diabetes Maternal Grandmother     Lung cancer Maternal Grandmother     Coronary artery disease Family     Stroke Paternal Grandmother     Lung cancer Maternal Grandfather           Allergies:     No Known  "Allergies     Current Medications:     Current Outpatient Medications   Medication Instructions    ascorbic acid (VITAMIN C) 500 mg, Oral, 2 times daily    aspirin (ECOTRIN LOW STRENGTH) 81 mg, Oral, Daily    co-enzyme Q-10 100 mg, Oral, Daily    ezetimibe (ZETIA) 10 mg, Oral, Daily    ferrous sulfate 324 mg, Oral, 2 times daily before meals    folic acid (FOLVITE) 1 mg, Oral, Daily    ketoconazole (NIZORAL) 2 % shampoo As needed    Multiple Vitamins-Minerals (MULTIVITAMIN ADULT) TABS 1 tablet, Oral, Daily    Omega-3 Fatty Acids (FISH OIL) 1200 MG CAPS 2 capsules, Oral, Daily    Vitamin D, Cholecalciferol, 25 MCG (1000 UT) TABS Daily           PRE-OP WORKSHEET DATA    Assessment of Pre-Operative Risks     MLJ Quality Hard Stops:  BMI (<40) : Estimated body mass index is 27.09 kg/m² as calculated from the following:    Height as of this encounter: 5' 10\" (1.778 m).    Weight as of this encounter: 85.6 kg (188 lb 12.8 oz).    Hgb ( >11):   Lab Results   Component Value Date    HGB 15.4 03/28/2024     HbA1c (<7.5) :   Lab Results   Component Value Date    HGBA1C 5.2 03/28/2024     GFR (>60) (Less then 45 = Nephrology consult):    Lab Results   Component Value Date    EGFR 81 03/28/2024         Active Decompensated Chronic Conditions which would delay surgery  No acutely decompensated medical issues such as recent CVA, MI, new onset arrhythmia, severe aortic stenosis, CHF, uncontrolled COPD       Functional capacity: Mowing lawn, Push mower  5-6 Mets  Pick the highest level patient can comfortably perform   (if less the 4 mets consider functional assessment via cardiac stress testing or consultation)    Assessment of intra and post operative respiratory, hemodynamic and thrombotic risks     Prior Anesthesia Reactions: No     Personal history of venous thromboembolic disease? No    History of steroid use > 5 mg for >2 weeks within last year? No      The patient's risk factors for cardiac complications include " :  none    Reji Henry has an IN HOSPITAL cardiac risk of RCI RISK CLASS I (0 risk factors, risk of major cardiac compl. appr. 0.5%) based on RCRI calculator    Cardiac Risk Estimation: per the Revised Cardiac Risk Index (Circ. 100:1043, 1999),        Pre-Op Data Reviewed:       Laboratory Results: I have personally reviewed the pertinent laboratory results/reports     EKG:I have personally reviewed pertinent reports.  . I personally reviewed and interpreted available tracings in the electronic medical record    Encounter Date: 03/28/24   EKG 12 lead   Result Value    Ventricular Rate 79    Atrial Rate 79    HI Interval 154    QRSD Interval 80    QT Interval 380    QTC Interval 435    P Axis 58    QRS Axis -2    T Wave Axis 46    Narrative    Normal sinus rhythm  Normal ECG  When compared with ECG of 27-MAY-2022 09:53,  No significant change was found  Confirmed by Brock Chambers ([id number]) on 3/28/2024 2:22:48 PM       OLD RECORDS: reviewed old records in the chart review section if EHR on day of visit.    Previous cardiopulmonary studies within the past year:  Echocardiogram: no  Cardiac Catheterization: no  Stress Test: no      Time of visit including pre-visit chart review, visit and post-visit coordination of plan and care , review of pre-surgical lab work, preparation and time spent documenting note in electronic medical record, time spent face-to-face in physical examination answering patient questions by care team 35 minutes             [facility]

## 2024-04-05 ENCOUNTER — LAB REQUISITION (OUTPATIENT)
Dept: LAB | Facility: HOSPITAL | Age: 79
End: 2024-04-05
Payer: MEDICARE

## 2024-04-05 ENCOUNTER — TRANSCRIBE ORDERS (OUTPATIENT)
Dept: LAB | Facility: CLINIC | Age: 79
End: 2024-04-05

## 2024-04-05 ENCOUNTER — TELEPHONE (OUTPATIENT)
Dept: UROLOGY | Facility: AMBULATORY SURGERY CENTER | Age: 79
End: 2024-04-05

## 2024-04-05 ENCOUNTER — APPOINTMENT (OUTPATIENT)
Dept: LAB | Facility: CLINIC | Age: 79
End: 2024-04-05
Payer: MEDICARE

## 2024-04-05 DIAGNOSIS — M17.12 PRIMARY OSTEOARTHRITIS OF LEFT KNEE: ICD-10-CM

## 2024-04-05 DIAGNOSIS — Z01.818 PREOP TESTING: ICD-10-CM

## 2024-04-05 DIAGNOSIS — C61 PROSTATE CANCER (HCC): Primary | ICD-10-CM

## 2024-04-05 DIAGNOSIS — M17.12 UNILATERAL PRIMARY OSTEOARTHRITIS, LEFT KNEE: ICD-10-CM

## 2024-04-05 DIAGNOSIS — Z01.818 ENCOUNTER FOR OTHER PREPROCEDURAL EXAMINATION: ICD-10-CM

## 2024-04-05 DIAGNOSIS — M17.12 PRIMARY OSTEOARTHRITIS OF LEFT KNEE: Primary | ICD-10-CM

## 2024-04-05 LAB
ABO GROUP BLD: NORMAL
BLD GP AB SCN SERPL QL: NEGATIVE
RH BLD: POSITIVE
SPECIMEN EXPIRATION DATE: NORMAL

## 2024-04-05 PROCEDURE — 36415 COLL VENOUS BLD VENIPUNCTURE: CPT

## 2024-04-05 PROCEDURE — 86901 BLOOD TYPING SEROLOGIC RH(D): CPT | Performed by: PHYSICIAN ASSISTANT

## 2024-04-05 PROCEDURE — 86850 RBC ANTIBODY SCREEN: CPT | Performed by: PHYSICIAN ASSISTANT

## 2024-04-05 PROCEDURE — 86900 BLOOD TYPING SEROLOGIC ABO: CPT | Performed by: PHYSICIAN ASSISTANT

## 2024-04-05 NOTE — TELEPHONE ENCOUNTER
----- Message from Flaco Santana PA-C sent at 4/4/2024  7:23 PM EDT -----  Patient can obtain repeat blood work before appointment with [facility] in [place]

## 2024-04-09 ENCOUNTER — CONSULT (OUTPATIENT)
Dept: ANESTHESIOLOGY | Facility: CLINIC | Age: 79
End: 2024-04-09
Payer: MEDICARE

## 2024-04-09 ENCOUNTER — OFFICE VISIT (OUTPATIENT)
Age: 79
End: 2024-04-09
Payer: MEDICARE

## 2024-04-09 VITALS
SYSTOLIC BLOOD PRESSURE: 142 MMHG | HEIGHT: 70 IN | OXYGEN SATURATION: 98 % | BODY MASS INDEX: 27.03 KG/M2 | DIASTOLIC BLOOD PRESSURE: 76 MMHG | WEIGHT: 188.8 LBS | HEART RATE: 96 BPM

## 2024-04-09 DIAGNOSIS — M17.12 PRIMARY OSTEOARTHRITIS OF LEFT KNEE: ICD-10-CM

## 2024-04-09 DIAGNOSIS — Z01.89 ENCOUNTER FOR GERIATRIC ASSESSMENT: ICD-10-CM

## 2024-04-09 DIAGNOSIS — Z01.818 PREOPERATIVE CLEARANCE: Primary | ICD-10-CM

## 2024-04-09 DIAGNOSIS — Z01.818 PREOP TESTING: ICD-10-CM

## 2024-04-09 DIAGNOSIS — C61 PROSTATE CANCER (HCC): ICD-10-CM

## 2024-04-09 DIAGNOSIS — I10 BENIGN ESSENTIAL HTN: ICD-10-CM

## 2024-04-09 PROCEDURE — G2211 COMPLEX E/M VISIT ADD ON: HCPCS | Performed by: INTERNAL MEDICINE

## 2024-04-09 PROCEDURE — 99213 OFFICE O/P EST LOW 20 MIN: CPT | Performed by: NURSE PRACTITIONER

## 2024-04-09 PROCEDURE — 99215 OFFICE O/P EST HI 40 MIN: CPT | Performed by: INTERNAL MEDICINE

## 2024-04-09 NOTE — PROGRESS NOTES
[facility]  CONSULT       Patient has the ability to take their vital signs at home____  Allergies reviewed today   PMH reviewed today   Medications reviewed today     See Geriatric Assessment below...  Cognitive Assessment:   CAM:   TUG <15 sec:  Falls (last 6 months): no  Hand  score:46  -Attempt 1:46  -Attempt 2:46  -Attempt 3:46  Chinedu Total Score: 20  PHQ- 9 Depression Scale:0  Nutrition Assessment Score:14  METS:6.36  Incentive Spirometry Level: 2500  Health goals:  -What are your overall health goals? Walking better    -What brings you strength? wife    -What activities are important to you? Play on computer     As always we discussed having your BEST surgery, and BEST recovery.  Surgery goals reviewed today.      Breathing exercises   Patient was encouraged to begin lung exercises today.  This could be accomplished through deep breathing and cough exercises.  Patient was taught how to use an incentive spirometer.  Return demonstration provided.    Eating/nutrition   Encouraged patient to increase oral protein intake prior to surgery.  This can be accomplished by consuming chicken, fish, tuna fish, cottage cheese, cheese, eggs, Greek yogurt, and protein shakes as needed.  I encouraged use of protein shakes such ENLIVE.  I also recommended making your own protein shakes with protein powder.     Sleep/Stress management  Patient was encouraged to rest their body prior to surgery.  Encouraged attempting to get 8 hours of sleep at night.  Avoid stress.  Avoid sick contacts.  Encouraged to find a relaxing hobby such as reading, meditation, listening to music.  Training exercises  Patient was encouraged to remain active as possible.  Today bilateral lower extremity generic exercises were taught for muscle strengthening and balance.  All exercises to be done sitting down.

## 2024-04-09 NOTE — PROGRESS NOTES
Name: Reji Henry      : 1945      MRN: [id number]  Encounter Provider: SURJIT Bowser  Encounter Date: 2024   Encounter department: [facility]    Assessment & Plan     78  year old male referred to SOC for pre-surgery geriatric screening 2.2 age   Other consult concerns include:  surgical optimization & BEST program   This SOC appointment is strictly for a geriatric assessment 2.2 advanced age.  Patient was previously seen in SOC by Dr. Salvador and his team for medical clearance. Please refer to his note for medical history.  Consult concerns: age   Case: [id number] Date/Time: 24 1245   Procedure: ARTHROPLASTY KNEE TOTAL and all associated procedures (Left: Knee)   Anesthesia type: Choice   Diagnosis: Primary osteoarthritis of left knee [M17.12]   Pre-op diagnosis: Primary osteoarthritis of left knee [M17.12]   Location: AN OR ROOM 01 / [facility]   Surgeons: Arabella Bernard MD       Last anesthesia   No concerns   Recommend spinal- education provided     1. Encounter for geriatric assessment  -     Ambulatory Referral to Surgical Optimization    Seen for SO   Seen for geriatrics  Started BEST   Received MC   At risk for post-op SOFÍA - NO   At risk for post-op SSI - NO   BEST   Breathing- instructed to exercise lungs prior to surgery via deep breathing   Eat- discussed increasing protein intake prior to surgery   Sleep/stress- encouraged 8-10 sleep @ night, stress reduction, avoid sick contacts and handwashing   Train- encouraged to remain active         Geriatrics   No cognitive concerns   No geriatric concerns   See Geriatric Assessment below...  Cognitive Assessment: 5  TUG <15 sec:no  Falls (last 6 months): no  Hand  score:46  -Attempt 1:46  -Attempt 2:46  -Attempt 3:46  Chinedu Total Score: 20  PHQ- 9 Depression Scale:0  Nutrition Assessment Score:14  METS:6.36  Incentive Spirometry Level: 2500  Health goals:  -What are your  overall health goals? Walking better     -What brings you strength? wife     -What activities are important to you? Play on computer     Subjective      HPI  Review of Systems   All other systems reviewed and are negative.    This SOC appointment is strictly for a geriatric assessment 2.2 advanced age.  Patient was previously seen in SOC by Dr. Salvador and his team for medical clearance. Please refer to his note for medical history.  He has been managing ongoing left knee pain is likely never left knee replacement.    I met patient in SOC.  He arrived alone.  He drove to today's appointment.  There were no cognitive concerns identified today.  Lives at home with his wife.  He is independent with all ADLs.  Denies any new developments in his health.  Admits to being well today.  Appears well today.      Surgical optimization complete.  SOFÍA risk low.  SSI risk low.  SOC anemia-no needs.  Started on best protocol today.  As always we discussed having your BEST surgery, and BEST recovery.  Surgery goals reviewed today.      Breathing exercises   Patient was encouraged to begin lung exercises today.  This could be accomplished through deep breathing and cough exercises.  Patient was taught how to use an incentive spirometer.  Return demonstration provided.    Eating/nutrition   Encouraged patient to increase oral protein intake prior to surgery.  Based on current weight of..., patient was instructed to consume...  Grams of protein a day.  This can be accomplished by consuming chicken, fish, tuna fish, cottage cheese, cheese, eggs, Greek yogurt, and protein shakes as needed.  I encouraged use of protein shakes such ENLIVE.  I also recommended making your own protein shakes with protein powder.   Sleep/Stress management  Patient was encouraged to rest their body prior to surgery.  Encouraged attempting to get 8 hours of sleep at night.  Avoid stress.  Avoid sick contacts.  Encouraged to find a relaxing hobby such as reading,  meditation, listening to music.  Training exercises  Patient was encouraged to remain active as possible.  Today bilateral lower extremity generic exercises were taught for muscle strengthening and balance.  All exercises to be done sitting down.       Current Outpatient Medications on File Prior to Visit   Medication Sig    ascorbic acid (VITAMIN C) 500 MG tablet Take 1 tablet (500 mg total) by mouth 2 (two) times a day    aspirin (ECOTRIN LOW STRENGTH) 81 mg EC tablet Take 81 mg by mouth daily    co-enzyme Q-10 100 mg capsule Take 100 mg by mouth daily    ezetimibe (ZETIA) 10 mg tablet Take 1 tablet (10 mg total) by mouth daily    ferrous sulfate 324 (65 Fe) mg Take 1 tablet (324 mg total) by mouth 2 (two) times a day before meals    folic acid (FOLVITE) 1 mg tablet Take 1 tablet (1 mg total) by mouth daily    ketoconazole (NIZORAL) 2 % shampoo if needed    Multiple Vitamins-Minerals (MULTIVITAMIN ADULT) TABS Take 1 tablet by mouth daily    Omega-3 Fatty Acids (FISH OIL) 1200 MG CAPS Take 2 capsules by mouth daily     Vitamin D, Cholecalciferol, 25 MCG (1000 UT) TABS in the morning       Objective     There were no vitals taken for this visit.    Physical Exam  Neurological:      General: No focal deficit present.      Mental Status: He is alert and oriented to person, place, and time. Mental status is at baseline.   Psychiatric:         Mood and Affect: Mood normal.         Behavior: Behavior normal.         Thought Content: Thought content normal.         Judgment: Judgment normal.       SURJIT Bowser

## 2024-04-09 NOTE — PROGRESS NOTES
Assessment/Plan:  Case: [id number] Date/Time: 04/22/24 1245   Procedure: ARTHROPLASTY KNEE TOTAL and all associated procedures (Left: Knee)   Anesthesia type: Choice   Diagnosis: Primary osteoarthritis of left knee [M17.12]   Pre-op diagnosis: Primary osteoarthritis of left knee [M17.12]   Location: AN OR ROOM 01 / [facility]   Surgeons: Arabella Bernard MD     No problem-specific Assessment & Plan notes found for this encounter.       {Assess/PlanSmartLinks:86123}      Subjective:      Patient ID: Reji Henry is a 78 y.o. male.    HPI    {Common ambulatory SmartLinks:41429}    Review of Systems      Objective:      There were no vitals taken for this visit.         Physical Exam

## 2024-04-15 ENCOUNTER — EVALUATION (OUTPATIENT)
Dept: PHYSICAL THERAPY | Facility: CLINIC | Age: 79
End: 2024-04-15
Payer: MEDICARE

## 2024-04-15 DIAGNOSIS — Z01.818 PREOP TESTING: ICD-10-CM

## 2024-04-15 DIAGNOSIS — M17.12 PRIMARY OSTEOARTHRITIS OF LEFT KNEE: ICD-10-CM

## 2024-04-15 PROCEDURE — 97162 PT EVAL MOD COMPLEX 30 MIN: CPT | Performed by: PHYSICAL THERAPIST

## 2024-04-15 PROCEDURE — 97110 THERAPEUTIC EXERCISES: CPT | Performed by: PHYSICAL THERAPIST

## 2024-04-15 PROCEDURE — 97535 SELF CARE MNGMENT TRAINING: CPT | Performed by: PHYSICAL THERAPIST

## 2024-04-19 ENCOUNTER — ANESTHESIA EVENT (OUTPATIENT)
Dept: PERIOP | Facility: HOSPITAL | Age: 79
End: 2024-04-19
Payer: MEDICARE

## 2024-04-22 ENCOUNTER — ANESTHESIA (OUTPATIENT)
Dept: PERIOP | Facility: HOSPITAL | Age: 79
End: 2024-04-22
Payer: MEDICARE

## 2024-04-22 ENCOUNTER — HOSPITAL ENCOUNTER (OUTPATIENT)
Facility: HOSPITAL | Age: 79
Setting detail: OUTPATIENT SURGERY
Discharge: HOME | End: 2024-04-22
Attending: ORTHOPAEDIC SURGERY | Admitting: ORTHOPAEDIC SURGERY
Payer: MEDICARE

## 2024-04-22 VITALS
BODY MASS INDEX: 26.92 KG/M2 | OXYGEN SATURATION: 96 % | SYSTOLIC BLOOD PRESSURE: 151 MMHG | DIASTOLIC BLOOD PRESSURE: 75 MMHG | WEIGHT: 188 LBS | TEMPERATURE: 97.8 F | RESPIRATION RATE: 18 BRPM | HEIGHT: 70 IN | HEART RATE: 100 BPM

## 2024-04-22 DIAGNOSIS — Z96.652 STATUS POST TOTAL KNEE REPLACEMENT USING CEMENT, LEFT: ICD-10-CM

## 2024-04-22 DIAGNOSIS — M17.12 PRIMARY OSTEOARTHRITIS OF LEFT KNEE: Primary | ICD-10-CM

## 2024-04-22 LAB
ABO GROUP BLD: NORMAL
GLUCOSE SERPL-MCNC: 101 MG/DL (ref 65–140)
RH BLD: POSITIVE

## 2024-04-22 PROCEDURE — 97116 GAIT TRAINING THERAPY: CPT

## 2024-04-22 PROCEDURE — C1776 JOINT DEVICE (IMPLANTABLE): HCPCS | Performed by: ORTHOPAEDIC SURGERY

## 2024-04-22 PROCEDURE — 97163 PT EVAL HIGH COMPLEX 45 MIN: CPT

## 2024-04-22 PROCEDURE — 82948 REAGENT STRIP/BLOOD GLUCOSE: CPT

## 2024-04-22 PROCEDURE — 27447 TOTAL KNEE ARTHROPLASTY: CPT | Performed by: ORTHOPAEDIC SURGERY

## 2024-04-22 PROCEDURE — C9290 INJ, BUPIVACAINE LIPOSOME: HCPCS | Performed by: ANESTHESIOLOGY

## 2024-04-22 PROCEDURE — C1713 ANCHOR/SCREW BN/BN,TIS/BN: HCPCS | Performed by: ORTHOPAEDIC SURGERY

## 2024-04-22 PROCEDURE — 97166 OT EVAL MOD COMPLEX 45 MIN: CPT

## 2024-04-22 DEVICE — ATTUNE PATELLA MEDIALIZED DOME 41MM CEMENTED AOX
Type: IMPLANTABLE DEVICE | Site: KNEE | Status: FUNCTIONAL
Brand: ATTUNE

## 2024-04-22 DEVICE — ATTUNE KNEE SYSTEM FEMORAL POSTERIOR STABILIZED SIZE 8 LEFT CEMENTED
Type: IMPLANTABLE DEVICE | Site: KNEE | Status: FUNCTIONAL
Brand: ATTUNE

## 2024-04-22 DEVICE — ATTUNE KNEE SYSTEM TIBIAL BASE ROTATING PLATFORM SIZE 8 CEMENTED
Type: IMPLANTABLE DEVICE | Site: KNEE | Status: FUNCTIONAL
Brand: ATTUNE

## 2024-04-22 DEVICE — SMARTSET HV HIGH VISCOSITY BONE CEMENT 40G
Type: IMPLANTABLE DEVICE | Site: KNEE | Status: FUNCTIONAL
Brand: SMARTSET

## 2024-04-22 DEVICE — ATTUNE KNEE SYSTEM TIBIAL INSERT ROTATING PLATFORM POSTERIOR STABILIZED SIZE 8 10MM AOX
Type: IMPLANTABLE DEVICE | Site: KNEE | Status: FUNCTIONAL
Brand: ATTUNE

## 2024-04-22 RX ORDER — VITAMIN B COMPLEX
100 TABLET ORAL DAILY
Status: CANCELLED | OUTPATIENT
Start: 2024-04-22

## 2024-04-22 RX ORDER — TRANEXAMIC ACID 10 MG/ML
1000 INJECTION, SOLUTION INTRAVENOUS ONCE
Status: COMPLETED | OUTPATIENT
Start: 2024-04-22 | End: 2024-04-22

## 2024-04-22 RX ORDER — OXYCODONE HYDROCHLORIDE 5 MG/1
TABLET ORAL
Qty: 30 TABLET | Refills: 0 | Status: SHIPPED | OUTPATIENT
Start: 2024-04-22

## 2024-04-22 RX ORDER — ENOXAPARIN SODIUM 100 MG/ML
40 INJECTION SUBCUTANEOUS DAILY
Status: CANCELLED | OUTPATIENT
Start: 2024-04-23

## 2024-04-22 RX ORDER — SODIUM CHLORIDE, SODIUM LACTATE, POTASSIUM CHLORIDE, CALCIUM CHLORIDE 600; 310; 30; 20 MG/100ML; MG/100ML; MG/100ML; MG/100ML
125 INJECTION, SOLUTION INTRAVENOUS CONTINUOUS
Status: DISCONTINUED | OUTPATIENT
Start: 2024-04-22 | End: 2024-04-22 | Stop reason: HOSPADM

## 2024-04-22 RX ORDER — ASCORBIC ACID 500 MG
500 TABLET ORAL 2 TIMES DAILY
Status: CANCELLED | OUTPATIENT
Start: 2024-04-22

## 2024-04-22 RX ORDER — BUPIVACAINE HYDROCHLORIDE 5 MG/ML
INJECTION, SOLUTION EPIDURAL; INTRACAUDAL
Status: COMPLETED | OUTPATIENT
Start: 2024-04-22 | End: 2024-04-22

## 2024-04-22 RX ORDER — HYDROMORPHONE HCL/PF 1 MG/ML
0.5 SYRINGE (ML) INJECTION EVERY 2 HOUR PRN
Status: CANCELLED | OUTPATIENT
Start: 2024-04-22 | End: 2024-04-24

## 2024-04-22 RX ORDER — OXYCODONE HYDROCHLORIDE 5 MG/1
5 TABLET ORAL EVERY 4 HOURS PRN
Status: CANCELLED | OUTPATIENT
Start: 2024-04-22

## 2024-04-22 RX ORDER — DOCUSATE SODIUM 100 MG/1
100 CAPSULE, LIQUID FILLED ORAL 2 TIMES DAILY
Qty: 10 CAPSULE | Refills: 0 | Status: SHIPPED | OUTPATIENT
Start: 2024-04-22

## 2024-04-22 RX ORDER — ONDANSETRON 4 MG/1
4 TABLET, FILM COATED ORAL EVERY 8 HOURS PRN
Qty: 5 TABLET | Refills: 0 | Status: SHIPPED | OUTPATIENT
Start: 2024-04-22

## 2024-04-22 RX ORDER — SENNOSIDES 8.6 MG
1 TABLET ORAL DAILY
Status: CANCELLED | OUTPATIENT
Start: 2024-04-22

## 2024-04-22 RX ORDER — SODIUM CHLORIDE, SODIUM LACTATE, POTASSIUM CHLORIDE, CALCIUM CHLORIDE 600; 310; 30; 20 MG/100ML; MG/100ML; MG/100ML; MG/100ML
INJECTION, SOLUTION INTRAVENOUS CONTINUOUS PRN
Status: DISCONTINUED | OUTPATIENT
Start: 2024-04-22 | End: 2024-04-22

## 2024-04-22 RX ORDER — HYDROMORPHONE HCL/PF 1 MG/ML
0.5 SYRINGE (ML) INJECTION
Status: DISCONTINUED | OUTPATIENT
Start: 2024-04-22 | End: 2024-04-22 | Stop reason: HOSPADM

## 2024-04-22 RX ORDER — METHYLPREDNISOLONE 4 MG/1
TABLET ORAL
Qty: 1 EACH | Refills: 0 | Status: SHIPPED | OUTPATIENT
Start: 2024-04-22

## 2024-04-22 RX ORDER — CHLORHEXIDINE GLUCONATE ORAL RINSE 1.2 MG/ML
15 SOLUTION DENTAL ONCE
Status: COMPLETED | OUTPATIENT
Start: 2024-04-22 | End: 2024-04-22

## 2024-04-22 RX ORDER — FENTANYL CITRATE 50 UG/ML
INJECTION, SOLUTION INTRAMUSCULAR; INTRAVENOUS AS NEEDED
Status: DISCONTINUED | OUTPATIENT
Start: 2024-04-22 | End: 2024-04-22

## 2024-04-22 RX ORDER — ACETAMINOPHEN 325 MG/1
650 TABLET ORAL EVERY 6 HOURS PRN
Status: CANCELLED | OUTPATIENT
Start: 2024-04-22

## 2024-04-22 RX ORDER — PROMETHAZINE HYDROCHLORIDE 25 MG/ML
12.5 INJECTION, SOLUTION INTRAMUSCULAR; INTRAVENOUS ONCE AS NEEDED
Status: DISCONTINUED | OUTPATIENT
Start: 2024-04-22 | End: 2024-04-22 | Stop reason: HOSPADM

## 2024-04-22 RX ORDER — ACETAMINOPHEN 325 MG/1
975 TABLET ORAL ONCE
Status: COMPLETED | OUTPATIENT
Start: 2024-04-22 | End: 2024-04-22

## 2024-04-22 RX ORDER — MIDAZOLAM HYDROCHLORIDE 2 MG/2ML
INJECTION, SOLUTION INTRAMUSCULAR; INTRAVENOUS AS NEEDED
Status: DISCONTINUED | OUTPATIENT
Start: 2024-04-22 | End: 2024-04-22

## 2024-04-22 RX ORDER — DOCUSATE SODIUM 100 MG/1
100 CAPSULE, LIQUID FILLED ORAL 2 TIMES DAILY
Status: CANCELLED | OUTPATIENT
Start: 2024-04-22

## 2024-04-22 RX ORDER — OXYCODONE HYDROCHLORIDE 5 MG/1
10 TABLET ORAL EVERY 4 HOURS PRN
Status: CANCELLED | OUTPATIENT
Start: 2024-04-22

## 2024-04-22 RX ORDER — ONDANSETRON 2 MG/ML
INJECTION INTRAMUSCULAR; INTRAVENOUS AS NEEDED
Status: DISCONTINUED | OUTPATIENT
Start: 2024-04-22 | End: 2024-04-22

## 2024-04-22 RX ORDER — TRANEXAMIC ACID 100 MG/ML
INJECTION, SOLUTION INTRAVENOUS AS NEEDED
Status: DISCONTINUED | OUTPATIENT
Start: 2024-04-22 | End: 2024-04-22 | Stop reason: HOSPADM

## 2024-04-22 RX ORDER — SENNOSIDES 8.6 MG
650 CAPSULE ORAL EVERY 8 HOURS PRN
Qty: 30 TABLET | Refills: 0 | Status: SHIPPED | OUTPATIENT
Start: 2024-04-22 | End: 2024-05-22

## 2024-04-22 RX ORDER — FOLIC ACID 1 MG/1
1 TABLET ORAL DAILY
Status: CANCELLED | OUTPATIENT
Start: 2024-04-22

## 2024-04-22 RX ORDER — BUPIVACAINE HYDROCHLORIDE 7.5 MG/ML
INJECTION, SOLUTION INTRASPINAL AS NEEDED
Status: DISCONTINUED | OUTPATIENT
Start: 2024-04-22 | End: 2024-04-22

## 2024-04-22 RX ORDER — EZETIMIBE 10 MG/1
10 TABLET ORAL DAILY
Status: CANCELLED | OUTPATIENT
Start: 2024-04-22

## 2024-04-22 RX ORDER — MEPERIDINE HYDROCHLORIDE 25 MG/ML
12.5 INJECTION INTRAMUSCULAR; INTRAVENOUS; SUBCUTANEOUS
Status: DISCONTINUED | OUTPATIENT
Start: 2024-04-22 | End: 2024-04-22 | Stop reason: HOSPADM

## 2024-04-22 RX ORDER — SODIUM CHLORIDE, SODIUM LACTATE, POTASSIUM CHLORIDE, CALCIUM CHLORIDE 600; 310; 30; 20 MG/100ML; MG/100ML; MG/100ML; MG/100ML
75 INJECTION, SOLUTION INTRAVENOUS CONTINUOUS
Status: CANCELLED | OUTPATIENT
Start: 2024-04-22 | End: 2024-04-23

## 2024-04-22 RX ORDER — CEFAZOLIN SODIUM 2 G/50ML
2000 SOLUTION INTRAVENOUS ONCE
Status: COMPLETED | OUTPATIENT
Start: 2024-04-22 | End: 2024-04-22

## 2024-04-22 RX ORDER — ONDANSETRON 2 MG/ML
4 INJECTION INTRAMUSCULAR; INTRAVENOUS ONCE AS NEEDED
Status: DISCONTINUED | OUTPATIENT
Start: 2024-04-22 | End: 2024-04-22 | Stop reason: HOSPADM

## 2024-04-22 RX ORDER — BUPIVACAINE HYDROCHLORIDE AND EPINEPHRINE 5; 5 MG/ML; UG/ML
INJECTION, SOLUTION PERINEURAL AS NEEDED
Status: DISCONTINUED | OUTPATIENT
Start: 2024-04-22 | End: 2024-04-22 | Stop reason: HOSPADM

## 2024-04-22 RX ORDER — LIDOCAINE HYDROCHLORIDE 20 MG/ML
INJECTION, SOLUTION EPIDURAL; INFILTRATION; INTRACAUDAL; PERINEURAL AS NEEDED
Status: DISCONTINUED | OUTPATIENT
Start: 2024-04-22 | End: 2024-04-22

## 2024-04-22 RX ORDER — ONDANSETRON 2 MG/ML
4 INJECTION INTRAMUSCULAR; INTRAVENOUS EVERY 6 HOURS PRN
Status: CANCELLED | OUTPATIENT
Start: 2024-04-22

## 2024-04-22 RX ORDER — PROPOFOL 10 MG/ML
INJECTION, EMULSION INTRAVENOUS AS NEEDED
Status: DISCONTINUED | OUTPATIENT
Start: 2024-04-22 | End: 2024-04-22

## 2024-04-22 RX ORDER — KETOROLAC TROMETHAMINE 30 MG/ML
INJECTION, SOLUTION INTRAMUSCULAR; INTRAVENOUS AS NEEDED
Status: DISCONTINUED | OUTPATIENT
Start: 2024-04-22 | End: 2024-04-22 | Stop reason: HOSPADM

## 2024-04-22 RX ORDER — FENTANYL CITRATE/PF 50 MCG/ML
25 SYRINGE (ML) INJECTION
Status: DISCONTINUED | OUTPATIENT
Start: 2024-04-22 | End: 2024-04-22 | Stop reason: HOSPADM

## 2024-04-22 RX ORDER — METHOCARBAMOL 500 MG/1
750 TABLET, FILM COATED ORAL EVERY 6 HOURS SCHEDULED
Status: CANCELLED | OUTPATIENT
Start: 2024-04-22

## 2024-04-22 RX ORDER — CEFAZOLIN SODIUM 1 G/50ML
1000 SOLUTION INTRAVENOUS EVERY 8 HOURS
Status: CANCELLED | OUTPATIENT
Start: 2024-04-22 | End: 2024-04-23

## 2024-04-22 RX ORDER — DEXAMETHASONE SODIUM PHOSPHATE 10 MG/ML
INJECTION, SOLUTION INTRAMUSCULAR; INTRAVENOUS AS NEEDED
Status: DISCONTINUED | OUTPATIENT
Start: 2024-04-22 | End: 2024-04-22

## 2024-04-22 RX ORDER — CALCIUM CARBONATE 500 MG/1
1000 TABLET, CHEWABLE ORAL DAILY PRN
Status: CANCELLED | OUTPATIENT
Start: 2024-04-22

## 2024-04-22 RX ORDER — MAGNESIUM HYDROXIDE 1200 MG/15ML
LIQUID ORAL AS NEEDED
Status: DISCONTINUED | OUTPATIENT
Start: 2024-04-22 | End: 2024-04-22 | Stop reason: HOSPADM

## 2024-04-22 RX ORDER — ALBUTEROL SULFATE 2.5 MG/3ML
2.5 SOLUTION RESPIRATORY (INHALATION) ONCE AS NEEDED
Status: DISCONTINUED | OUTPATIENT
Start: 2024-04-22 | End: 2024-04-22 | Stop reason: HOSPADM

## 2024-04-22 RX ADMIN — CEFAZOLIN SODIUM 2000 MG: 2 SOLUTION INTRAVENOUS at 09:58

## 2024-04-22 RX ADMIN — BUPIVACAINE HYDROCHLORIDE 10 ML: 5 INJECTION, SOLUTION EPIDURAL; INTRACAUDAL at 10:17

## 2024-04-22 RX ADMIN — LIDOCAINE HYDROCHLORIDE 80 MG: 20 INJECTION, SOLUTION EPIDURAL; INFILTRATION; INTRACAUDAL at 10:08

## 2024-04-22 RX ADMIN — FENTANYL CITRATE 50 MCG: 50 INJECTION INTRAMUSCULAR; INTRAVENOUS at 10:06

## 2024-04-22 RX ADMIN — TRANEXAMIC ACID 1000 MG: 10 INJECTION, SOLUTION INTRAVENOUS at 10:12

## 2024-04-22 RX ADMIN — SODIUM CHLORIDE, SODIUM LACTATE, POTASSIUM CHLORIDE, AND CALCIUM CHLORIDE: .6; .31; .03; .02 INJECTION, SOLUTION INTRAVENOUS at 10:26

## 2024-04-22 RX ADMIN — BUPIVACAINE HYDROCHLORIDE 15 ML: 5 INJECTION, SOLUTION EPIDURAL; INTRACAUDAL at 10:16

## 2024-04-22 RX ADMIN — DEXAMETHASONE SODIUM PHOSPHATE 10 MG: 10 INJECTION INTRAMUSCULAR; INTRAVENOUS at 10:10

## 2024-04-22 RX ADMIN — CHLORHEXIDINE GLUCONATE 15 ML: 1.2 SOLUTION ORAL at 08:18

## 2024-04-22 RX ADMIN — SODIUM CHLORIDE, SODIUM LACTATE, POTASSIUM CHLORIDE, AND CALCIUM CHLORIDE: .6; .31; .03; .02 INJECTION, SOLUTION INTRAVENOUS at 10:00

## 2024-04-22 RX ADMIN — ONDANSETRON 4 MG: 2 INJECTION INTRAMUSCULAR; INTRAVENOUS at 10:08

## 2024-04-22 RX ADMIN — PROPOFOL 50 MG: 10 INJECTION, EMULSION INTRAVENOUS at 10:09

## 2024-04-22 RX ADMIN — MIDAZOLAM 1 MG: 1 INJECTION INTRAMUSCULAR; INTRAVENOUS at 09:56

## 2024-04-22 RX ADMIN — PHENYLEPHRINE HYDROCHLORIDE 50 MCG/MIN: 50 INJECTION INTRAVENOUS at 10:26

## 2024-04-22 RX ADMIN — PROPOFOL 80 MCG/KG/MIN: 10 INJECTION, EMULSION INTRAVENOUS at 10:10

## 2024-04-22 RX ADMIN — ACETAMINOPHEN 975 MG: 325 TABLET, FILM COATED ORAL at 08:18

## 2024-04-22 RX ADMIN — BUPIVACAINE 20 ML: 13.3 INJECTION, SUSPENSION, LIPOSOMAL INFILTRATION at 10:17

## 2024-04-22 RX ADMIN — PROPOFOL 80 MCG/KG/MIN: 10 INJECTION, EMULSION INTRAVENOUS at 11:20

## 2024-04-22 RX ADMIN — BUPIVACAINE HYDROCHLORIDE IN DEXTROSE 1.7 ML: 7.5 INJECTION, SOLUTION SUBARACHNOID at 10:07

## 2024-04-22 RX ADMIN — SODIUM CHLORIDE, SODIUM LACTATE, POTASSIUM CHLORIDE, AND CALCIUM CHLORIDE 125 ML/HR: .6; .31; .03; .02 INJECTION, SOLUTION INTRAVENOUS at 08:19

## 2024-04-22 NOTE — ANESTHESIA POSTPROCEDURE EVALUATION
Post-Op Assessment Note    CV Status:  Stable  Pain Score: 0    Pain management: adequate       Mental Status:  Alert and awake   Hydration Status:  Euvolemic   PONV Controlled:  Controlled   Airway Patency:  Patent  Airway: intubated  Two or more mitigation strategies used for obstructive sleep apnea   Post Op Vitals Reviewed: Yes    No anethesia notable event occurred.    Staff: CRNA               BP   126/78   Temp   97.5   Pulse  86   Resp   17   SpO2   95

## 2024-04-22 NOTE — PHYSICAL THERAPY NOTE
PHYSICAL THERAPY TREATMENT NOTE    Patient Name: Reji Henry  Today's Date: 4/22/2024 04/22/24 1607   PT Last Visit   PT Visit Date 04/22/24   Pain Assessment   Pain Assessment Tool 0-10   Pain Score 2   Pain Location/Orientation Orientation: Left;Location: Knee   Hospital Pain Intervention(s) Repositioned;Ambulation/increased activity   Restrictions/Precautions   LLE Weight Bearing Per Order WBAT   Other Precautions Fall Risk;Pain   General   Chart Reviewed Yes   Additional Pertinent History room air resting pulse ox 95% and 108 BPM. seated blood pressure 151/75.   Family/Caregiver Present Yes   Cognition   Overall Cognitive Status WFL   Arousal/Participation Alert;Cooperative   Attention Within functional limits   Orientation Level Oriented to person;Other (Comment)  (pt was identified w/ full name, birth date)   Following Commands Follows all commands and directions without difficulty   Subjective   Subjective pt seen sitting out of bed w/ spouse present. agreed to PT session. reports having left knee pain.   Transfers   Sit to Stand 5  Supervision   Additional items Increased time required   Stand to Sit 5  Supervision   Additional items Increased time required;Verbal cues  (for hand placement, L LE position)   Ambulation/Elevation   Gait pattern Short stride;Decreased L stance;Excessively slow   Gait Assistance 5  Supervision   Additional items Verbal cues  (for walker placement)   Assistive Device Rolling walker   Distance 80 feet. seated rest break. 120 feet.  (additional not possible secondary to fatigue)   Stair Management Assistance 5  Supervision   Additional items Verbal cues;Increased time required  (for sequencing)   Stair Management Technique One rail R;Step to pattern   Number of Stairs 2   Balance   Static Sitting Good   Dynamic Sitting Fair +   Static Standing Fair +  (w/ roller walker)   Ambulatory Fair -  (w/  roller walker)   Activity Tolerance   Activity Tolerance Patient limited by fatigue;Patient limited by pain   Nurse Made Aware spoke to Whitley DAWSON, Ximena LLAMAS, Dr. Bernard   Assessment   Problem List Decreased strength;Decreased range of motion;Decreased endurance;Impaired balance;Decreased mobility;Decreased safety awareness;Impaired sensation;Orthopedic restrictions;Pain   Assessment pt shows improvement in mobility status from previous session w/ increased ambulation tolerance and initiation of stair use. pt needed standby assist to maintain safety, including occasional cues for technique. pt needs continued inpatient PT to maximize levels of mobility and independence.   Goals   Patient Goals I want to go home.   STG Expiration Date 04/27/24   Short Term Goal #1 pt will: Increase L LE strength 1/2 grade to facilitate independent mobility, Perform bed mobility independently to increase level of independence, Perform all transfers independently to improve independence, Ambulate 250 ft. with roller walker independently w/o LOB to improve functional independence, Navigate 10 stair(s) independently with unilateral handrail to facilitate return to previous living environment, Increase ambulatory balance 1 grade to decrease risk for falls, Complete exercise program independently to increase strength and endurance, Tolerate 3 hr OOB to faciliate upright tolerance, Improve Barthel Index score to 90 or greater to facilitate independence, and Complete Timed Up and Go or Comfortable Gait Speed to further assess mobility and monitor progress   PT Treatment Day 1   Plan   Treatment/Interventions Functional transfer training;LE strengthening/ROM;Elevations;Therapeutic exercise;Endurance training;Gait training;Bed mobility;Patient/family training;Equipment eval/education   Progress Progressing toward goals   PT Frequency Twice a day   Discharge Recommendation   Rehab Resource Intensity Level, PT III (Minimum Resource Intensity)    AM-PAC Basic Mobility Inpatient   Turning in Flat Bed Without Bedrails 4   Lying on Back to Sitting on Edge of Flat Bed Without Bedrails 3   Moving Bed to Chair 3   Standing Up From Chair Using Arms 3   Walk in Room 3   Climb 3-5 Stairs With Railing 3   Basic Mobility Inpatient Raw Score 19   Basic Mobility Standardized Score 42.48   [facility] Highest Level Of Mobility   -HL Goal 6: Walk 10 steps or more   -HLM Achieved 7: Walk 25 feet or more   End of Consult   Patient Position at End of Consult Bedside chair;All needs within reach     The patient's AM-PAC Basic Mobility Inpatient Short Form Raw Score is 19. A Raw score of greater than 16 suggests the patient may benefit from discharge to home. Please also refer to the recommendation of the Physical Therapist for safe discharge planning.    Skilled inpatient PT recommended while in hospital to progress pt toward treatment goals.    Shaheen Barton, PT

## 2024-04-22 NOTE — PLAN OF CARE
Problem: PHYSICAL THERAPY ADULT  Goal: Performs mobility at highest level of function for planned discharge setting.  See evaluation for individualized goals.  Description: Treatment/Interventions: Functional transfer training, LE strengthening/ROM, Elevations, Therapeutic exercise, Endurance training, Gait training, Bed mobility, Patient/family training, Equipment eval/education          See flowsheet documentation for full assessment, interventions and recommendations.  Note:    Problem List: Decreased strength, Decreased range of motion, Decreased endurance, Impaired balance, Decreased mobility, Decreased safety awareness, Impaired sensation, Orthopedic restrictions  Assessment: Pt presents secondary to treatment of left knee DJD with valgus deformity and flexion contracture of about 15 degrees, which has failed conservative treatment. Dx: left knee DJD. 4/22/24 left TKA. order placed for PT eval and tx, w/ activity order of activity as tolerated and WBAT L LE. pt presents w/ comorbidities of HTN, hypercholesterolemia, prostate cancer, and OA and personal factors of living in 2 story house and stair(s) to enter home. pt presents w/ weakness, decreased ROM, decreased endurance, impaired balance, gait deviations, altered sensation, decreased safety awareness, orthopedic restrictions, and fall risk. these impairments are evident in findings from physical examination (weakness, decreased ROM, and altered sensation), mobility assessment (need for standby to min assist w/ all phases of mobility when usually mobilizing independently, tolerance to only 70 feet of ambulation, and need for cueing for mobility technique), and Barthel Index: 70/100. pt needed input for mobility technique. pt is at risk for falls due to physical and safety awareness deficits. pt's clinical presentation is unstable/unpredictable (evident in poor blood pressure control, need for assist w/ all phases of mobility when usually mobilizing  independently, tolerance to only 70 feet of ambulation, and need for input for mobility technique/safety). pt needs inpatient PT tx to improve mobility deficits and progress mobility training as appropriate.        Rehab Resource Intensity Level, PT: III (Minimum Resource Intensity)    See flowsheet documentation for full assessment.

## 2024-04-22 NOTE — ANESTHESIA PROCEDURE NOTES
Spinal Block    Patient location during procedure: OR  Start time: 4/22/2024 10:07 AM  Reason for block: procedure for pain and at surgeon's request  Staffing  Performed by: Hien Elizabeth CRNA  Authorized by: Ted De Souza MD    Preanesthetic Checklist  Completed: patient identified, IV checked, site marked, risks and benefits discussed, surgical consent, monitors and equipment checked, pre-op evaluation and timeout performed  Spinal Block  Patient position: sitting  Prep: ChloraPrep and site prepped and draped  Patient monitoring: frequent blood pressure checks, continuous pulse ox and heart rate  Approach: midline  Location: L3-4  Needle  Needle type: Pencan   Needle gauge: 24 G  Needle length: 4 in  Assessment  Sensory level: T4  Injection Assessment:  negative aspiration for heme, no paresthesia on injection and positive aspiration for clear CSF.  Post-procedure:  site cleaned

## 2024-04-22 NOTE — ANESTHESIA PROCEDURE NOTES
Peripheral Block    Patient location during procedure: holding area  Reason for block: at surgeon's request and post-op pain management  Staffing  Performed by: Ted De Souza MD  Authorized by: Ted De Souza MD    Preanesthetic Checklist  Completed: patient identified, IV checked, site marked, risks and benefits discussed, surgical consent, monitors and equipment checked, pre-op evaluation and timeout performed  Peripheral Block  Patient position: supine  Prep: ChloraPrep  Patient monitoring: continuous pulse ox and frequent blood pressure checks  Block type: Adductor Canal  Laterality: left  Injection technique: single-shot  Procedures: ultrasound guided, Ultrasound guidance required for the procedure to increase accuracy and safety of medication placement and decrease risk of complications.  Ultrasound permanent image savedbupivacaine (PF) (MARCAINE) 0.5 % injection 20 mL - Perineural   10 mL - 4/22/2024 10:17:00 AM  Assessment  Injection assessment: incremental injection and local visualized surrounding nerve on ultrasound  Paresthesia pain: none  patient tolerated the procedure well with no immediate complications

## 2024-04-22 NOTE — INTERVAL H&P NOTE
H&P reviewed. After examining the patient I find no changes in the patients condition since the H&P had been written.    Vitals:    04/22/24 0813   BP: (!) 174/81   Pulse: 90   Resp: 16   Temp: (!) 97.1 °F (36.2 °C)   SpO2: 96%   Patient seen and examined  General: No apparent distress  CV: S1-S2  Abdomen: Soft  Chest: No audible wheezing  Left lower extremity: No abrasions or open wounds; valgus alignment of the knee; neurologically and vascularly intact distally  To the operating room for left total knee arthroplasty  Pros and cons of operative and nonoperative intervention discussed with patient  We will follow-up postoperatively

## 2024-04-22 NOTE — PLAN OF CARE
Problem: PHYSICAL THERAPY ADULT  Goal: Performs mobility at highest level of function for planned discharge setting.  See evaluation for individualized goals.  Description: Treatment/Interventions: Functional transfer training, LE strengthening/ROM, Elevations, Therapeutic exercise, Endurance training, Gait training, Bed mobility, Patient/family training, Equipment eval/education          See flowsheet documentation for full assessment, interventions and recommendations.  4/22/2024 1626 by Shaheen Barton, PT  Outcome: Progressing  Note:    Problem List: Decreased strength, Decreased range of motion, Decreased endurance, Impaired balance, Decreased mobility, Decreased safety awareness, Impaired sensation, Orthopedic restrictions, Pain  Assessment: pt shows improvement in mobility status from previous session w/ increased ambulation tolerance and initiation of stair use. pt needed standby assist to maintain safety, including occasional cues for technique. pt needs continued inpatient PT to maximize levels of mobility and independence.        Rehab Resource Intensity Level, PT: III (Minimum Resource Intensity)    See flowsheet documentation for full assessment.     4/22/2024 1442 by Shaheen Barton, PT  Note:    Problem List: Decreased strength, Decreased range of motion, Decreased endurance, Impaired balance, Decreased mobility, Decreased safety awareness, Impaired sensation, Orthopedic restrictions  Assessment: Pt presents secondary to treatment of left knee DJD with valgus deformity and flexion contracture of about 15 degrees, which has failed conservative treatment. Dx: left knee DJD. 4/22/24 left TKA. order placed for PT eval and tx, w/ activity order of activity as tolerated and WBAT L LE. pt presents w/ comorbidities of HTN, hypercholesterolemia, prostate cancer, and OA and personal factors of living in 2 story house and stair(s) to enter home. pt presents w/ weakness, decreased ROM, decreased endurance,  impaired balance, gait deviations, altered sensation, decreased safety awareness, orthopedic restrictions, and fall risk. these impairments are evident in findings from physical examination (weakness, decreased ROM, and altered sensation), mobility assessment (need for standby to min assist w/ all phases of mobility when usually mobilizing independently, tolerance to only 70 feet of ambulation, and need for cueing for mobility technique), and Barthel Index: 70/100. pt needed input for mobility technique. pt is at risk for falls due to physical and safety awareness deficits. pt's clinical presentation is unstable/unpredictable (evident in poor blood pressure control, need for assist w/ all phases of mobility when usually mobilizing independently, tolerance to only 70 feet of ambulation, and need for input for mobility technique/safety). pt needs inpatient PT tx to improve mobility deficits and progress mobility training as appropriate.        Rehab Resource Intensity Level, PT: III (Minimum Resource Intensity)    See flowsheet documentation for full assessment.

## 2024-04-22 NOTE — PLAN OF CARE
Problem: OCCUPATIONAL THERAPY ADULT  Goal: Performs self-care activities at highest level of function for planned discharge setting.  See evaluation for individualized goals.  Description: Treatment Interventions: ADL retraining, Functional transfer training, Endurance training, Patient/family training, Equipment evaluation/education, Compensatory technique education          See flowsheet documentation for full assessment, interventions and recommendations.   Note: Limitation: Decreased ADL status, Decreased Safe judgement during ADL, Decreased endurance, Decreased self-care trans, Decreased high-level ADLs (impaired balance, fxnl mobility, act anamaria, fxnl reach, standing anamaria, strength, pacing)  Prognosis: Good  Assessment: Pt is a 78 y.o. male seen for OT evaluation s/p admission to [facility] on 4/22/2024 for elective L TKA with Dr. Bernard. Pt is currently POD #0 and is WBAT. Personal and env factors supporting pt at time of IE include (I) PLOF, supportive wife, accessible home environment, and FFSU. Personal and env factors inhibiting engagement in occupations include advanced age, difficulty completing IADLs, and [employer]AR[employer] home, tub/shower . Performance deficits that affect the pt’s occupational performance can be seen above. Due to pt's current functional limitations and medical complications pt is functioning below baseline. Pt would benefit from continued skilled OT treatment in order to maximize safety, independence and overall performance with ADLs, functional mobility, and functional transfers in order to achieve highest level of function.     Rehab Resource Intensity Level, OT: No post-acute rehabilitation needs (no OT needs)

## 2024-04-22 NOTE — PHYSICAL THERAPY NOTE
PHYSICAL THERAPY EVALUATION NOTE    Patient Name: Reji Henry  Today's Date: 4/22/2024  AGE:   78 y.o.  Mrn:   [id number]  ADMIT DX:  Primary osteoarthritis of left knee [M17.12]    Past Medical History:   Diagnosis Date    Allergic Spring pollens - years ago    Arthritis 2018    Knee    Basal cell carcinoma of nose     removed 07/2015    BPH with elevated PSA 12/11/2018    Cancer (HCC)     prostate    Hiatal hernia     sliding -- states no GERD symptoms, takes no meds    Hypercholesterolemia     Hyperlipidemia     Mitral valve disorder     Osteoarthritis     Seasonal allergies     resolved 01/21/2015     Length Of Stay: 0  PHYSICAL THERAPY EVALUATION :    04/22/24 1405   PT Last Visit   PT Visit Date 04/22/24   Pain Assessment   Pain Assessment Tool 0-10   Pain Score No Pain   Restrictions/Precautions   Other Precautions WBS;Fall Risk   Home Living   Type of Home House   Home Layout Two level;Able to live on main level with bedroom/bathroom;Other (Comment)  (2 PEARL w/ railing sided railing)   Additional Comments lives w/ spouse. ambulates w/o device. owns roller walker. ordered shower chair. independent w/ ADLs and IADls. no falls in last 6 months.   General   Additional Pertinent History 4/22/24 at 8:13 blood pressure was 174/81   Family/Caregiver Present Yes   Cognition   Arousal/Participation Alert   Orientation Level Oriented to person;Other (Comment)  (pt was identified w/ full name, birth date)   Following Commands Follows all commands and directions without difficulty   Comments pt stated feeling lightheaded after initial sit to stand transfer. after return to seated position blood pressure was 148/81.   Subjective   Subjective pt seen supine in bed w/ spouse present. pt agreed to PT eval. denied pain. pt had lightheadedness w/ transfer.   RUE Assessment   RUE Assessment WFL   LUE Assessment   LUE Assessment WFL   RLE  Assessment   RLE Assessment WFL  (5/5)   LLE Assessment   LLE Assessment X  (hip flexion 3+/5 extension 4-/5, knee flexion 3+/5 extension 3-/5, ankle 3/5)   Coordination   Sensation X  (pt stated having numbness of groin and bilateral thighs)   Bed Mobility   Supine to Sit 5  Supervision   Additional items HOB elevated;Bedrails;Increased time required   Transfers   Sit to Stand 4  Minimal assistance  (progressing to supervision.)   Additional items Assist x 1;Increased time required;Verbal cues  (for hand placement, LE positioning)   Stand to Sit 5  Supervision   Additional items Increased time required;Verbal cues  (for body positioning, hand placement)   Additional Comments pt stood to pull up underwear and then pants w/ minx1. seated rest break. pt noted buckling of left knee. pt stood approximately 1 minute w/ supervision to minx1. pt completed standing weight shifts. seated rest break. pt then ambulated as noted below.   Ambulation/Elevation   Gait pattern Foward flexed;Short stride;Decreased L stance;Excessively slow   Gait Assistance 5  Supervision  (w/ chair follow)   Additional items Verbal cues;Tactile cues  (for walker positioning, sequencing)   Assistive Device Rolling walker   Distance 4 feet. seated rest break. 70 feet.  (additional ambulation not possible due to fatigue)   Stair Management Assistance Not tested  (secondary to limited ambulation tolerance, safety concern)   Balance   Static Sitting Good   Dynamic Sitting Fair   Static Standing Fair -  (w/ roller walker)   Ambulatory Fair -  (w/ roller walker)   Activity Tolerance   Activity Tolerance Patient limited by fatigue   Nurse Made Aware spoke to Cher Murrieta OT   Assessment   Problem List Decreased strength;Decreased range of motion;Decreased endurance;Impaired balance;Decreased mobility;Decreased safety awareness;Impaired sensation;Orthopedic restrictions   Assessment Pt presents secondary to treatment of left knee DJD with valgus  deformity and flexion contracture of about 15 degrees, which has failed conservative treatment. Dx: left knee DJD. 4/22/24 left TKA. order placed for PT eval and tx, w/ activity order of activity as tolerated and WBAT L LE. pt presents w/ comorbidities of HTN, hypercholesterolemia, prostate cancer, and OA and personal factors of living in 2 story house and stair(s) to enter home. pt presents w/ weakness, decreased ROM, decreased endurance, impaired balance, gait deviations, altered sensation, decreased safety awareness, orthopedic restrictions, and fall risk. these impairments are evident in findings from physical examination (weakness, decreased ROM, and altered sensation), mobility assessment (need for standby to min assist w/ all phases of mobility when usually mobilizing independently, tolerance to only 70 feet of ambulation, and need for cueing for mobility technique), and Barthel Index: 70/100. pt needed input for mobility technique. pt is at risk for falls due to physical and safety awareness deficits. pt's clinical presentation is unstable/unpredictable (evident in poor blood pressure control, need for assist w/ all phases of mobility when usually mobilizing independently, tolerance to only 70 feet of ambulation, and need for input for mobility technique/safety). pt needs inpatient PT tx to improve mobility deficits and progress mobility training as appropriate.   Goals   Patient Goals I want to be able to walk more without pain.   STG Expiration Date 04/27/24   Short Term Goal #1 pt will: Increase L LE strength 1/2 grade to facilitate independent mobility, Perform bed mobility independently to increase level of independence, Perform all transfers independently to improve independence, Ambulate 250 ft. with roller walker independently w/o LOB to improve functional independence, Navigate 10 stair(s) independently with unilateral handrail to facilitate return to previous living environment, Increase ambulatory  balance 1 grade to decrease risk for falls, Complete exercise program independently to increase strength and endurance, Tolerate 3 hr OOB to faciliate upright tolerance, Improve Barthel Index score to 90 or greater to facilitate independence, and Complete Timed Up and Go or Comfortable Gait Speed to further assess mobility and monitor progress   PT Treatment Day 1   Plan   Treatment/Interventions Functional transfer training;LE strengthening/ROM;Elevations;Therapeutic exercise;Endurance training;Gait training;Bed mobility;Patient/family training;Equipment eval/education   PT Frequency Twice a day   Discharge Recommendation   Rehab Resource Intensity Level, PT III (Minimum Resource Intensity)   AM-PAC Basic Mobility Inpatient   Turning in Flat Bed Without Bedrails 4   Lying on Back to Sitting on Edge of Flat Bed Without Bedrails 3   Moving Bed to Chair 3   Standing Up From Chair Using Arms 3   Walk in Room 3   Climb 3-5 Stairs With Railing 2   Basic Mobility Inpatient Raw Score 18   Basic Mobility Standardized Score 41.05   [facility] Highest Level Of Mobility   JH-HLM Goal 6: Walk 10 steps or more   JH-HLM Achieved 7: Walk 25 feet or more   Barthel Index   Feeding 10   Bathing 0   Grooming Score 5   Dressing Score 5   Bladder Score 10   Bowels Score 10   Toilet Use Score 10   Transfers (Bed/Chair) Score 10   Mobility (Level Surface) Score 10   Stairs Score 0   Barthel Index Score 70   Additional Treatment Session   Start Time 1405   End Time 1415   Treatment Assessment Pt agreed to participate in PT intervention. Therapist provided education to pt for mobility technique including transfers and roller walker management. Education was provided due to findings from evaluation. Sit <---> stand transfer modified independent. Ambulated 90 feet w/ roller walker w/ supervision and chair follow. Pt was found to have improvement after education w/ increased ambulation tolerance. Pt continues to be a fall risk. continued  inpatient PT tx is indicated to reduce fall risk factors.   Equipment Use roller walker   Additional Treatment Day 1   End of Consult   Patient Position at End of Consult Bedside chair;All needs within reach     The patient's AM-PAC Basic Mobility Inpatient Short Form Raw Score is 18. A Raw score of greater than 16 suggests the patient may benefit from discharge to home. Please also refer to the recommendation of the Physical Therapist for safe discharge planning.    Skilled PT recommended while in hospital and upon DC to progress pt toward treatment goals.     Shaheen Barton, PT

## 2024-04-22 NOTE — OP NOTE
Op Note  Reji Henry  MRN: [id number]      Unit/Bed#: AN OR MAIN    PreOp Dx: left knee DJD      Postop Dx: left knee DJD      Procedure: left total knee arthroplasty      Surgeon: Arabella Bernard MD      Assistants:Poli Dawson PA-C ,Farhad Hood MD ,        Fluids:       EBL:       Drains: none      Specimens: No       Complications: No       Condition: stable transferred to postanesthesia care unit      Implants: Depuy Attune RP  Femoral, size: 8  Tibial tray: 8  Polyethylene: 10  Patellar button: 41      78 y.o.male, presents at this time, secondary to treatment of left knee DJD with valgus deformity and flexion contracture of about 15 degrees, which has failed conservative treatment.    The patient was told of all the pros and cons of operative and nonoperative intervention. Some of the complications of operative intervention include infection, bleeding, scarring, nerve injury, vascular injury, fracture, continued pain, decreased range of motion, DVT, PE death, loss of limb, need for further surgery, not obtain an results. The patient wished. Patient did consent for operative intervention for this pathology.    Patient was brought to the operating room. Patient was anesthetized as anesthesia team. Patient was prepped and draped in normal sterile fashion. After this was done, we did conduct a time out to make sure correct. Patient was in the room, prepped marked and draped. Implants were in the room, Rep. For the implants were present, DVT prophylaxis and antibiotics were addressed.    Midline incision was made over the anterior knee after going through skin, fatty tissue, fascia was identified. Flaps were created both medially and laterally. Medial parapatellar incision was made. Excision of Hoffa fat pad was conducted. At this time because this was a valgus knee, we exposed the medial and lateral plateaus but not ligamentous releasing was done. Pie crusting of the ITB and posterolateral releasing  of the capsule were undertaken to get appropriate balancing on the coronal and sagittal planes. We're able to excise the fatty tissue from the anterior aspect of the distal femur. We were able to at this time, flex the knee with the patella everted. Intramedullary reamer was used. Intramedullary guide for the femur was then placed to determine how much of the distal femur to cut and also, valgus cut angle. Pins were then placed. Intramedullary guide was removed. Distal femoral cut was made.  Attention was now to the proximal tibia. Extramedullary guide was used. After making sure that we took the appropriate amount from the medial and lateral side with the aid of a measuring device, the jig was held in place with pins. Protection of the medial and lateral ligaments, as well as the popliteal region, was done. Proximal tibial cut was made. Extension gaps were evaluated.  Size of the femur was then determined with the aid of a guide. After this was done, we placed the appropriate sized block. These were held down with pins. Anterior and posterior cuts were made. Anterior, posterior, chamfer cuts were made. We did check our flexion gap and was noted to be appropriate. This was a PCL sacrificing device being used Therefore a box cut was made.  Tibial tray size was determined. This was held down with 2 small screws. The reamer and the punch was then used with the appropriate guide to make sure that these were all done, the appropriate manner. Guide was removed. Trial femoral component was placed. Trial tibial polyethylene was placed. Patient was noted to be stable. On coronal and sagittal planes.   Patellar button size was determined after the articular surface of the patella was excised. The patella button was placed on the medial aspect of the patella. Holes were drilled for our true patella button to be cemented on. We tracked the knee, and we noted that the patella was tracking appropriately over the trochlear of the  trial implants. After this was done we did drill holes in our trial femoral component. We then removed. All trial components.  Copious irrigation was used at the operative site. We did place some bone within the intramedullary canal of the femur. High viscocity cement was used and all components were placed, including the femur, tibia, and patella button. A trial tibial Polyethylene was placed. After cement had dried, removed the trial polyethylene and removed any excess cement that was in the popliteal region. Copious irrigation was used. The tibial polyethylene was then placed. Patient was placed in the appropriate ranges of motion and patient's Knee was noted to be stable.  Tourniquet was discontinued. Hemostasis was obtained. #1 Vicryl sutures were used to reapproximate the parapatellar incision. 2-0 Vicryl sutures for the subcutaneous closure. This was reinforced with staples. Wounds were cleaned and dried. Acticoat, 4 x 4, ABDs and web roll was placed prior to Ace bandage be placed from the foot. All the way to the mid thigh region.  Patient was awakened as anesthesia team noted to be a stable condition and transferred to postanesthesia care unit

## 2024-04-22 NOTE — ANESTHESIA PREPROCEDURE EVALUATION
Procedure:  ARTHROPLASTY KNEE TOTAL and all associated procedures (Left: Knee)    Relevant Problems   CARDIO   (+) Benign essential HTN   (+) Hypercholesterolemia      GI/HEPATIC   (+) Sliding hiatal hernia      /RENAL   (+) Prostate cancer (HCC)      MUSCULOSKELETAL   (+) Primary osteoarthritis of left knee   (+) Sliding hiatal hernia        Physical Exam    Airway    Mallampati score: I  TM Distance: >3 FB  Neck ROM: full     Dental       Cardiovascular  Cardiovascular exam normal    Pulmonary  Pulmonary exam normal     Other Findings        Anesthesia Plan  ASA Score- 2     Anesthesia Type- spinal with ASA Monitors.         Additional Monitors:     Airway Plan:     Comment: W/ block.       Plan Factors-Exercise tolerance (METS): >4 METS.    Chart reviewed. EKG reviewed. Imaging results reviewed. Existing labs reviewed. Patient summary reviewed.    Patient is not a current smoker.  Patient instructed to abstain from smoking on day of procedure. Patient did not smoke on day of surgery.    Obstructive sleep apnea risk education given perioperatively.        Induction-     Postoperative Plan- Plan for postoperative opioid use.     Informed Consent- Anesthetic plan and risks discussed with patient.  I personally reviewed this patient with the CRNA. Discussed and agreed on the Anesthesia Plan with the CRNA..

## 2024-04-22 NOTE — ANESTHESIA PROCEDURE NOTES
Peripheral Block    Patient location during procedure: holding area  Reason for block: at surgeon's request and post-op pain management  Staffing  Performed by: Ted De Souza MD  Authorized by: Ted De Souza MD    Preanesthetic Checklist  Completed: patient identified, IV checked, site marked, risks and benefits discussed, surgical consent, monitors and equipment checked, pre-op evaluation and timeout performed  Peripheral Block  Patient position: supine  Prep: ChloraPrep  Patient monitoring: continuous pulse ox and frequent blood pressure checks  Block type: IPACK  Laterality: left  Injection technique: single-shot  Procedures: ultrasound guided, Ultrasound guidance required for the procedure to increase accuracy and safety of medication placement and decrease risk of complications.  Ultrasound permanent image savedbupivacaine (PF) (MARCAINE) 0.5 % injection 20 mL - Perineural   15 mL - 4/22/2024 10:16:00 AM  Assessment  Injection assessment: incremental injection and local visualized surrounding nerve on ultrasound  Paresthesia pain: none  patient tolerated the procedure well with no immediate complications

## 2024-04-22 NOTE — OCCUPATIONAL THERAPY NOTE
Occupational Therapy Evaluation     Patient Name: Reji Henry  Today's Date: 4/22/2024  Problem List  Principal Problem:    Status post total knee replacement using cement, left  Active Problems:    Primary osteoarthritis of left knee    Past Medical History  Past Medical History:   Diagnosis Date    Allergic Spring pollens - years ago    Arthritis 2018    Knee    Basal cell carcinoma of nose     removed 07/2015    BPH with elevated PSA 12/11/2018    Cancer (HCC)     prostate    Hiatal hernia     sliding -- states no GERD symptoms, takes no meds    Hypercholesterolemia     Hyperlipidemia     Mitral valve disorder     Osteoarthritis     Seasonal allergies     resolved 01/21/2015     Past Surgical History  Past Surgical History:   Procedure Laterality Date    COLONOSCOPY      DENTAL SURGERY      1970's    EGD      KNEE SURGERY      OTHER SURGICAL HISTORY      cerumen removal 1st 05/26/2011;   2nd:  08/02/2012    NV BX PROSTATE STRTCTC SATURATION SAMPLING IMG GID N/A 06/08/2022    Procedure: TRANSPERINEAL MRI FUSION BIOPSY PROSTATE;  Surgeon: Vicente Kee MD;  [facility];  Service: Urology    NV PROSTATE NEEDLE BIOPSY ANY APPROACH N/A 04/06/2021    Procedure: TRANSPERINEAL MRI FUSION PROSTATE BIOPSY;  Surgeon: Castro Ortega MD;  Location: [facility];  Service: Urology    PROSTATE BIOPSY               04/22/24 1425   OT Last Visit   OT Visit Date 04/22/24   Note Type   Note type Evaluation   Pain Assessment   Pain Assessment Tool 0-10   Pain Score No Pain  (Pt reports continued numbness in groin area from block. Reports full feeling in legs)   Restrictions/Precautions   Weight Bearing Precautions Per Order Yes   LLE Weight Bearing Per Order WBAT   Other Precautions Fall Risk;Pain   Home Living   Type of Home House   Home Layout Two level;Able to live on main level with bedroom/bathroom;Stairs to enter with rails  (2 PEARL, FFSU with full bathroom possible)   Bathroom Shower/Tub Tub/shower unit  (on  "first floor, walk in shower on 2nd floor)   Bathroom Toilet Standard   Bathroom Equipment Grab bars in shower;Shower chair   Bathroom Accessibility Accessible   Home Equipment Walker   Additional Comments no use of AD at baseline   Prior Function   Level of [place] Independent with ADLs   Lives With Spouse   Receives Help From Family   IADLs Independent with driving;Independent with meal prep;Independent with medication management   Falls in the last 6 months 0   Vocational Retired   Comments Pt reports that prior to surgery he was having increased pain with standing for long periods of time and when walking   Lifestyle   Autonomy PTA pt living with wife in 2SH, FFSU is possible, pt (I) with ADLs and IADLs, (-)falls, (+)drives, no use of AD at baseline   Reciprocal Relationships supportive wife   Service to Others retired    Intrinsic Gratification Wife reports that pt likes to \"valdemar\" and pt enjoys being on the computer   General   Additional Pertinent History Pt seen for elective L TKA with Dr. Bernard. Pt is currently POD #0 and is WBAT. PMH: hx of prostate cancer, HTN   Family/Caregiver Present Yes  (wife at bedside)   Subjective   Subjective \"I just feel a little spongy in the knees\"   ADL   Eating Assistance 7  Independent   Grooming Assistance 7  Independent   UB Bathing Assistance 6  Modified Independent   LB Bathing Assistance 4  Minimal Assistance   UB Dressing Assistance 6  Modified independent   UB Dressing Deficit   (donning t shirt)   LB Dressing Assistance 4  Minimal Assistance   LB Dressing Deficit Increased time to complete;Supervision/safety;Verbal cueing;Thread RLE into underwear;Thread LLE into underwear;Thread LLE into pants;Thread RLE into pants;Pull up over hips  (A with threading LLE into pants due to narrow pants)   Toileting Assistance  5  Supervision/Setup   Additional Comments Did not observe eating, grooming, UB bathing, LB bathing, and toileting at time of " evaluation, with use of clinical reasoning, pt's performance throughout evaluation indicates that pt may be able to perform these tasks at the levels listed above   Bed Mobility   Supine to Sit 5  Supervision   Additional items Increased time required;Verbal cues   Transfers   Sit to Stand 4  Minimal assistance  (progressing to (S) with consecutive trial)   Additional items Assist x 1;Increased time required;Verbal cues   Stand to Sit 5  Supervision   Additional items Increased time required;Verbal cues   Additional Comments use of RW, requiring edu on hand placement. 1st 2 trials of sit > stand pt with forward and retropulsive leaning and requiring min a x1 for steadying and return to upright positioning. On 3rd and 4th trials of sit > stand no LOB noted  (Suspect LOB due to numbness in groin/coming out of anesthesia. Pt requiring cuing to keep eyes open)   Functional Mobility   Functional Mobility 4  Minimal assistance  (progressing to close (S))   Additional Comments functional household distance with chair follow due to episodes of LOB   Additional items Rolling walker   Balance   Static Sitting Good   Dynamic Sitting Fair +   Static Standing Fair -   Dynamic Standing Fair -   Ambulatory Fair -   Activity Tolerance   Activity Tolerance Patient limited by fatigue   Medical Staff Made Aware PT TANESHA PEÑA Assessment   RUE Assessment WFL   LUE Assessment   LUE Assessment WFL   Hand Function   Gross Motor Coordination Functional   Fine Motor Coordination Functional   Cognition   Overall Cognitive Status WFL   Arousal/Participation Alert;Cooperative   Attention Within functional limits   Orientation Level Oriented X4   Memory Within functional limits   Following Commands Follows one step commands without difficulty   Comments pleasant and cooperative   Assessment   Limitation Decreased ADL status;Decreased Safe judgement during ADL;Decreased endurance;Decreased self-care trans;Decreased high-level  ADLs  (impaired balance, fxnl mobility, act anamaria, fxnl reach, standing anamaria, strength, pacing)   Prognosis Good   Assessment Pt is a 78 y.o. male seen for OT evaluation s/p admission to [facility] on 4/22/2024 for elective L TKA with Dr. Bernard. Pt is currently POD #0 and is WBAT. Personal and env factors supporting pt at time of IE include (I) PLOF, supportive wife, accessible home environment, and FFSU. Personal and env factors inhibiting engagement in occupations include advanced age, difficulty completing IADLs, and PEARL home, tub/shower . Performance deficits that affect the pt’s occupational performance can be seen above. Due to pt's current functional limitations and medical complications pt is functioning below baseline. Pt would benefit from continued skilled OT treatment in order to maximize safety, independence and overall performance with ADLs, functional mobility, and functional transfers in order to achieve highest level of function.   Goals   Patient Goals to be able to walk again without pain   LTG Time Frame 10-14   Long Term Goal see goals listed below   Plan   Treatment Interventions ADL retraining;Functional transfer training;Endurance training;Patient/family training;Equipment evaluation/education;Compensatory technique education   Goal Expiration Date 05/02/24   OT Treatment Day 0   OT Frequency 3-5x/wk   Discharge Recommendation   Rehab Resource Intensity Level, OT No post-acute rehabilitation needs  (no OT needs)   AM-PAC Daily Activity Inpatient   Lower Body Dressing 3   Bathing 3   Toileting 3   Upper Body Dressing 4   Grooming 4   Eating 4   Daily Activity Raw Score 21   Daily Activity Standardized Score (Calc for Raw Score >=11) 44.27   AM-PAC Applied Cognition Inpatient   Following a Speech/Presentation 4   Understanding Ordinary Conversation 4   Taking Medications 4   Remembering Where Things Are Placed or Put Away 4   Remembering List of 4-5 Errands 4   Taking Care of Complicated Tasks 4    Applied Cognition Raw Score 24   Applied Cognition Standardized Score 62.21   End of Consult   Education Provided Yes  (Pt and wife educated on safe transfer techniques with car and shower transfers, edu on safe use of RW in home, edu on LB dressing techniques and pacing of tasks)   Patient Position at End of Consult Bedside chair;All needs within reach     GOALS:        -Patient will be Mod I with LB dressing with use of AE and AD as needed in order to increase (I) with ADLs    -Patient will be Mod I with LB bathing with use of AE and AD as needed in order to increase (I) with ADLs    -Patient will complete toileting w/ Mod I w/ G hygiene/thoroughness in order to reduce caregiver burden    -Patient will demonstrate Mod I with bed mobility for ability to manage own comfort and initiate OOB tasks.     -Patient will perform functional transfers with Mod I to/from all surfaces using DME as needed in order to increase (I) with functional tasks    -Patient will be Mod I with functional mobility to/from bathroom for increased independence with toileting tasks    -Patient will complete simulated tub/shower transfer with overall mod I in order to safely participate in bathing tasks upon d/c home    The patient's raw score on the -PAC Daily Activity Inpatient Short Form is 21. A raw score of greater than or equal to 19 suggests the patient may benefit from discharge to home. Please refer to the recommendation of the Occupational Therapist for safe discharge planning.    This session, pt required and most appropriately benefited from partial skilled OT/PT co-eval due to unpredictable medical and/or functional status. OT and PT goals were addressed separately as seen in documentation.     Cher Brown MS, OTR/L

## 2024-04-22 NOTE — DISCHARGE INSTR - AVS FIRST PAGE
Discharge Instructions - Orthopedics  Reji Henry 78 y.o. male MRN: [id number]  Unit/Bed#: [facility]    Weight Bearing Status:                                           Weight Bearing as tolerated to the left lower extremity.    DVT prophylaxis:  Complete course of Aspirin 81 mg twice daily x 35 days from date of surgery as directed    Pain:  Start oxycodone 5 mg every 6 hrs after last dose taken after surgery for 1 day  Transition to oxycodone 5 mg every 6 hours as needed    Showering Instructions:   Do not shower until 5 days from date of surgery  Do not soak your incision(Tubs, Jacuzzi's, pools, ext)    Dressing Instructions:   May remove dressing 5 days from date of surgery OR may leave dressing in place until follow up office visit 2 weeks from surgery date  Keep dressing/incision clean and intact until follow up appointment.  Do not apply any creams or ointments/lotions to your incisions including antibiotic ointment, peroxide    Driving Instructions:  No driving until cleared by Orthopaedic Surgery.    PT/OT:  Continue PT/OT on outpatient basis as directed  Continue motion and strengthening exercises while at home    Appt Instructions:   If you do not have your appointment, please call the clinic at [phone]  Otherwise followup as scheduled    Contact the office sooner if you experience any increased numbness/tingling in the extremities.

## 2024-04-23 ENCOUNTER — TELEPHONE (OUTPATIENT)
Dept: OBGYN CLINIC | Facility: HOSPITAL | Age: 79
End: 2024-04-23

## 2024-04-23 ENCOUNTER — TELEPHONE (OUTPATIENT)
Age: 79
End: 2024-04-23

## 2024-04-23 NOTE — TELEPHONE ENCOUNTER
Caller: Jony    Doctor: Marco Antonio    Reason for call: Had TKA yesterday and dressing is falling off. Patient is able to see staples and was concerned. TT on call      Response from on call Dr    Get an ABD from a local pharmacy put it on the incision wrap it with an ace wrap and if it’s not staying on, asked to be seen in the office tomorrow    Call back#: [phone]

## 2024-04-23 NOTE — TELEPHONE ENCOUNTER
Patient contacted for a postoperative follow up assessment. Patient reports doing               . Patient states current pain level of a  0-2/10  when sitting and 0-2/10 when walking with RW. Patient states they have minimal pain and it is relieved with medication regimen. Patient denies increase in swelling and dressing is clean, dry and intact. Patient is icing the site regularly. PT 4/25 at 11:15AM    We reviewed patients AVS medication list. Patient is taking Tylenol 650mg every 8 hours, Methylprednisolone 4mg, ASA 81mg BID, Colace 100mg BID. Patient has not yet had a BM but is passing gas.      Patient denies nausea, vomiting, abdominal pain, chest pain, shortness of breath, fever, dizziness and calf pain. Patient confirmed post-op appointment with surgeon on  5/7 at 11AM. Patient does not have any other questions or concerns at this time. Pt was encouraged to call with any questions, concerns or issues.

## 2024-04-23 NOTE — TELEPHONE ENCOUNTER
Caller: Jony (Patient)    Doctor: Marco Antonio    Reason for call: Patient had a Left TKA yesterday 04/22/24. He had some questions concerning discharge summary, regarding medications and dressing care.     Would like a call.     Call back#: [phone]

## 2024-04-24 ENCOUNTER — TELEPHONE (OUTPATIENT)
Age: 79
End: 2024-04-24

## 2024-04-24 NOTE — TELEPHONE ENCOUNTER
Spoke to patient, all questions answered.   pt encouraged to call me with questions, concerns or issues.

## 2024-04-24 NOTE — TELEPHONE ENCOUNTER
Caller: Patient     Doctor: Marco Antonio     Reason for call: Patient is asking if he should stop in to the office to make sure the dressing that he and his daughter did to re-wrap his knee is done correctly. Please advise     Call back#: [phone]

## 2024-04-24 NOTE — TELEPHONE ENCOUNTER
Contacted patient to review dressing instructions and no need to stop in for re-wrapping. No answer, VM left to call me to discuss.

## 2024-04-24 NOTE — TELEPHONE ENCOUNTER
Caller: Patient    Doctor: Marco Antonio    Reason for call:     Patient is returning call, please call him back to speak with him..    Call back#: [phone]

## 2024-04-25 ENCOUNTER — OFFICE VISIT (OUTPATIENT)
Dept: PHYSICAL THERAPY | Facility: CLINIC | Age: 79
End: 2024-04-25
Payer: MEDICARE

## 2024-04-25 DIAGNOSIS — Z01.818 PREOP TESTING: ICD-10-CM

## 2024-04-25 DIAGNOSIS — Z96.652 TOTAL KNEE REPLACEMENT STATUS, LEFT: ICD-10-CM

## 2024-04-25 DIAGNOSIS — M17.12 PRIMARY OSTEOARTHRITIS OF LEFT KNEE: Primary | ICD-10-CM

## 2024-04-25 PROCEDURE — 97116 GAIT TRAINING THERAPY: CPT | Performed by: PHYSICAL THERAPIST

## 2024-04-25 PROCEDURE — 97110 THERAPEUTIC EXERCISES: CPT | Performed by: PHYSICAL THERAPIST

## 2024-04-25 PROCEDURE — 97140 MANUAL THERAPY 1/> REGIONS: CPT | Performed by: PHYSICAL THERAPIST

## 2024-04-25 PROCEDURE — 97164 PT RE-EVAL EST PLAN CARE: CPT | Performed by: PHYSICAL THERAPIST

## 2024-04-25 NOTE — PROGRESS NOTES
PT Evaluation     Today's date: 2024  Patient name: Reji Henry  : 1945  MRN: [id number]  Referring provider: Poli Dawson*  Dx:   Encounter Diagnosis     ICD-10-CM    1. Primary osteoarthritis of left knee  M17.12       2. Preop testing  Z01.818       3. Total knee replacement status, left  Z96.652                      Assessment  Assessment details: Pt presents with post-operative impairments of L knee ROM, strength, WB tolerance, gait, and pain. He will benefit from skilled PT services to address his impairments in order to decrease pain and restore function.  Impairments: abnormal gait, abnormal or restricted ROM, activity intolerance, impaired physical strength, lacks appropriate home exercise program, pain with function, weight-bearing intolerance, poor posture  and poor body mechanics  Understanding of Dx/Px/POC: good   Prognosis: good    Goals  STG - 2-4 wks  1) pt will d/c FWW  2) pt will demonstrate PROM 5-100 deg  3) pt will report a 1/4 mile ambulation tolerance    MTG - 4-8 wks  1) pt will d/c SPC  2) pt will demonstrate PROM 3-110 deg  3) pt will report a 1/2 mile ambulation tolerance    LTG - 6-12 wks  1) pt will normalize gait and stairs  2) pt will demonstrate PROM 0-120 deg  3) pt will report a 1 mile ambulation tolerance    Plan  Planned modality interventions: cryotherapy  Planned therapy interventions: joint mobilization, manual therapy, neuromuscular re-education, patient education, postural training, strengthening, stretching, therapeutic activities, therapeutic exercise, home exercise program, gait training, functional ROM exercises, flexibility, body mechanics training and balance  Frequency: 2x week  Duration in weeks: 12  Treatment plan discussed with: patient        Subjective Evaluation    History of Present Illness  Mechanism of injury: Pt is a 78 y.o. male with PMHx significant for HTN, prostate CA. He presents to PT s/p L TKA 24.  Patient Goals  Patient  "goals for therapy: decreased edema, improved balance, decreased pain, increased motion, increased strength, independence with ADLs/IADLs and return to sport/leisure activities    Pain  Current pain ratin  At best pain ratin  At worst pain ratin  Location: L knee generalized ant > post  Quality: pressure, grinding and dull ache  Relieving factors: rest  Aggravating factors: standing, walking and stair climbing  Progression: worsening    Social Support  Steps to enter house: yes  2  Stairs in house: yes   14  Lives in: multiple-level home  Lives with: spouse    Employment status: not working    Diagnostic Tests  X-ray: abnormal (severe lateral L knee OA, valgus deformity)  Treatments  Previous treatment: physical therapy  Discharged from (in last 30 days): inpatient hospitalization        Objective     Observations   Left Knee   Positive for deformity (mild valgus). Negative for drainage.     Active Range of Motion   Left Knee   Flexion: 95 degrees   Extension: 8 degrees with pain    Passive Range of Motion   Left Knee   Flexion: 105 degrees with pain  Extension: 7 degrees with pain    Strength/Myotome Testing     Left Knee   Flexion: 3+  Extension: 3-  Quadriceps contraction: good    General Comments:      Knee Comments  Gait FWW mod loss of L TKE, normalized step-through pattern    Steps 4\" unable to tolerate step-down             Precautions: PMHx: HTN, prostate CA  Dx: s/p L TKA 24    Manuals 4/15 4/25           L knee PROM  10 min                                                  Neuro Re-Ed                                                                                                        Ther Ex             Bike: rocking  L1  5 min           Quad sets 5\"x10 5\"x15           Supine GA stretch 10\"x3 10\"x3           Heel slides 5\"x10 5\"x10           SAQ 1x10            LAQ  2x10 3x10          Seated L peroneal nerve glides 1x10 D/c                        Ther Activity             Post-Op " "Self-Care 10 min                         Gait Training             Gait training on floor  FWW  300 ft           Step-ups  4\"/  1x10           Step-downs  4\"/  1x1*                        Modalities                                            "

## 2024-04-29 ENCOUNTER — OFFICE VISIT (OUTPATIENT)
Dept: PHYSICAL THERAPY | Facility: CLINIC | Age: 79
End: 2024-04-29
Payer: MEDICARE

## 2024-04-29 DIAGNOSIS — Z96.652 TOTAL KNEE REPLACEMENT STATUS, LEFT: ICD-10-CM

## 2024-04-29 DIAGNOSIS — M17.12 PRIMARY OSTEOARTHRITIS OF LEFT KNEE: Primary | ICD-10-CM

## 2024-04-29 PROCEDURE — 97140 MANUAL THERAPY 1/> REGIONS: CPT | Performed by: PHYSICAL THERAPIST

## 2024-04-29 PROCEDURE — 97116 GAIT TRAINING THERAPY: CPT | Performed by: PHYSICAL THERAPIST

## 2024-04-29 PROCEDURE — 97110 THERAPEUTIC EXERCISES: CPT | Performed by: PHYSICAL THERAPIST

## 2024-04-29 NOTE — PROGRESS NOTES
"Daily Note     Today's date: 2024  Patient name: Reji Henry  : 1945  MRN: [id number]  Referring provider: Poli Dawson*  Dx:   Encounter Diagnosis     ICD-10-CM    1. Primary osteoarthritis of left knee  M17.12       2. Total knee replacement status, left  Z96.652                      Subjective: Pt reports feeling more stiffness over the past few days.    Objective: See treatment diary below    PROM 7-110 deg    Assessment: Pt demonstrated early L heel rise during stance phase which is driving L knee stiffness.    Plan: Cont. POC.     Precautions: PMHx: HTN, prostate CA  Dx: s/p L TKA 24    Manuals 4/15 4/25 4/29          L knee PROM  10 min 10 min                                                 Neuro Re-Ed                                                                                                        Ther Ex             Bike: rocking  L1  5 min L1  5 min          Quad sets 5\"x10 5\"x15 5\"x15 5\"x20         Supine GA stretch 10\"x3 10\"x3 20\"x4          Heel slides 5\"x10 5\"x10 5\"x15 5\"x20         SAQ 1x10   3x10         LAQ  2x10 3x10 3x10         Seated L peroneal nerve glides 1x10 D/c                        Ther Activity             Post-Op Self-Care 10 min                         Gait Training             Gait training on floor  FWW  300 ft FWW  300 ft          Step-ups  4\"/  1x10 4\"/  1x12 4\"/  1x10    6\"/  1x10         Step-downs  4\"/  1x1*  4\"/  1x10                      Modalities                                            "

## 2024-05-02 ENCOUNTER — OFFICE VISIT (OUTPATIENT)
Dept: PHYSICAL THERAPY | Facility: CLINIC | Age: 79
End: 2024-05-02
Payer: MEDICARE

## 2024-05-02 DIAGNOSIS — M17.12 PRIMARY OSTEOARTHRITIS OF LEFT KNEE: Primary | ICD-10-CM

## 2024-05-02 DIAGNOSIS — Z96.652 TOTAL KNEE REPLACEMENT STATUS, LEFT: ICD-10-CM

## 2024-05-02 PROCEDURE — 97116 GAIT TRAINING THERAPY: CPT

## 2024-05-02 PROCEDURE — 97110 THERAPEUTIC EXERCISES: CPT

## 2024-05-02 NOTE — PROGRESS NOTES
"Daily Note     Today's date: 2024  Patient name: Reji Henry  : 1945  MRN: [id number]  Referring provider: Poli Dawson*  Dx:   Encounter Diagnosis     ICD-10-CM    1. Primary osteoarthritis of left knee  M17.12       2. Total knee replacement status, left  Z96.652                      Subjective: Pt reports feeling stiff and sore with walking.    Objective: See treatment diary below    PROM 7-110 deg    Assessment: Pt required cueing to increase left push off during gait cycle. Unable to progress knee PROM secondary to pain.     Plan: Cont. POC.     Precautions: PMHx: HTN, prostate CA  Dx: s/p L TKA 24    Manuals 4/15 4/25 4/29 5/2         L knee PROM  10 min 10 min 10 min                                                Neuro Re-Ed                                                                                                        Ther Ex             Bike: rocking  L1  5 min L1  5 min L1  5 min         Quad sets 5\"x10 5\"x15 5\"x15 5\"x20         Supine GA stretch 10\"x3 10\"x3 20\"x4 20\"x4         Heel slides 5\"x10 5\"x10 5\"x15 5\"x20         SAQ 1x10   1#/ 3x10         LAQ  2x10 3x10 1#/ 3x10         Seated L peroneal nerve glides 1x10 D/c                        Ther Activity             Post-Op Self-Care 10 min                         Gait Training             Gait training on floor  FWW  300 ft FWW  300 ft  ft         Step-ups  4\"/  1x10 4\"/  1x12 4\"/  1x10    6\"/  1x9         Step-downs  4\"/  1x1*  4\"/  1x10                      Modalities                                            "

## 2024-05-06 ENCOUNTER — OFFICE VISIT (OUTPATIENT)
Dept: PHYSICAL THERAPY | Facility: CLINIC | Age: 79
End: 2024-05-06
Payer: MEDICARE

## 2024-05-06 DIAGNOSIS — Z96.652 TOTAL KNEE REPLACEMENT STATUS, LEFT: Primary | ICD-10-CM

## 2024-05-06 DIAGNOSIS — M17.12 PRIMARY OSTEOARTHRITIS OF LEFT KNEE: ICD-10-CM

## 2024-05-06 PROCEDURE — 97110 THERAPEUTIC EXERCISES: CPT | Performed by: PHYSICAL THERAPIST

## 2024-05-06 NOTE — PROGRESS NOTES
"Daily Note     Today's date: 2024  Patient name: Reji Henry  : 1945  MRN: [id number]  Referring provider: Poli Dwason*  Dx:   Encounter Diagnosis     ICD-10-CM    1. Total knee replacement status, left  Z96.652       2. Primary osteoarthritis of left knee  M17.12                        Subjective: Pt reports increased soreness after last session that has moderately improved over the weekend. He requested less aggressive manual stretching today as he adjusts to therapy. He notes a slight improvement in stiffness in the morning.     Objective: See treatment diary below    PROM 6-110 deg    Assessment: Improved knee extension noted during manual stretches, but no change noted in knee flexion again today. The patient demonstrated fair tolerance to remaining exercises.     Plan: Cont. POC.     Precautions: PMHx: HTN, prostate CA  Dx: s/p L TKA 24    Manuals 4/15 4/25 4/29 5/2 5/6        L knee PROM  10 min 10 min 10 min 10 min                                               Neuro Re-Ed                                                                                                        Ther Ex             Bike: rocking  L1  5 min L1  5 min L1  5 min L1 5 min        Quad sets 5\"x10 5\"x15 5\"x15 5\"x20 5\"x20        Supine GA stretch 10\"x3 10\"x3 20\"x4 20\"x4 20\"x4        Heel slides 5\"x10 5\"x10 5\"x15 5\"x20 5\"x20        SAQ 1x10   1#/ 3x10 1#/ 3x10        LAQ  2x10 3x10 1#/ 3x10 1#/ 3x10        Seated L peroneal nerve glides 1x10 D/c                        Ther Activity             Post-Op Self-Care 10 min                         Gait Training             Gait training on floor  FWW  300 ft FWW  300 ft  ft FWW 300ft        Step-ups  4\"/  1x10 4\"/  1x12 4\"/  1x10    6\"/  1x9 4\"/ 1x10    6\"/ 1x10        Step-downs  4\"/  1x1*  4\"/  1x10 4\"/ 1x10                     Modalities                                            "

## 2024-05-07 ENCOUNTER — OFFICE VISIT (OUTPATIENT)
Dept: OBGYN CLINIC | Facility: CLINIC | Age: 79
End: 2024-05-07

## 2024-05-07 ENCOUNTER — HOSPITAL ENCOUNTER (OUTPATIENT)
Dept: RADIOLOGY | Facility: HOSPITAL | Age: 79
Discharge: HOME | End: 2024-05-07
Payer: MEDICARE

## 2024-05-07 VITALS — WEIGHT: 188 LBS | BODY MASS INDEX: 26.92 KG/M2 | HEIGHT: 70 IN

## 2024-05-07 DIAGNOSIS — Z96.652 S/P TKR (TOTAL KNEE REPLACEMENT) USING CEMENT, LEFT: ICD-10-CM

## 2024-05-07 DIAGNOSIS — Z96.652 S/P TKR (TOTAL KNEE REPLACEMENT) USING CEMENT, LEFT: Primary | ICD-10-CM

## 2024-05-07 PROCEDURE — 73562 X-RAY EXAM OF KNEE 3: CPT

## 2024-05-07 PROCEDURE — 99024 POSTOP FOLLOW-UP VISIT: CPT | Performed by: PHYSICIAN ASSISTANT

## 2024-05-07 NOTE — PROGRESS NOTES
78 y.o.male presents for 2 weeks postoperative visit status post left TKA. Patient denies any chest pain, shortness or breath or calf pain .  Patient is taking aspirin for DVT prophylaxis.  Pain is well controlled with medication.  Patient states Tylenol is used only as needed for pain control at this time doing well with physical therapy ambulate with assistance of a walker    Review of Systems  Review of systems negative unless otherwise specified in HPI    Past Medical History  Past Medical History:   Diagnosis Date    Allergic Spring pollens - years ago    Arthritis 2018    Knee    Basal cell carcinoma of nose     removed 07/2015    BPH with elevated PSA 12/11/2018    Cancer (HCC)     prostate    Hiatal hernia     sliding -- states no GERD symptoms, takes no meds    Hypercholesterolemia     Hyperlipidemia     Mitral valve disorder     Osteoarthritis     Seasonal allergies     resolved 01/21/2015       Past Surgical History  Past Surgical History:   Procedure Laterality Date    COLONOSCOPY      DENTAL SURGERY      1970's    EGD      KNEE SURGERY      OTHER SURGICAL HISTORY      cerumen removal 1st 05/26/2011;   2nd:  08/02/2012    WA ARTHRP KNE CONDYLE&PLATU MEDIAL&LAT COMPARTMENTS Left 4/22/2024    Procedure: ARTHROPLASTY KNEE TOTAL and all associated procedures;  Surgeon: Arabella Bernard MD;  Location: [facility];  Service: Orthopedics    WA BX PROSTATE STRTCTC SATURATION SAMPLING IMG GID N/A 06/08/2022    Procedure: TRANSPERINEAL MRI FUSION BIOPSY PROSTATE;  Surgeon: Vicente Kee MD;  Location: [facility];  Service: Urology    WA PROSTATE NEEDLE BIOPSY ANY APPROACH N/A 04/06/2021    Procedure: TRANSPERINEAL MRI FUSION PROSTATE BIOPSY;  Surgeon: Castro Ortega MD;  Location: [facility];  Service: Urology    PROSTATE BIOPSY         Current Medications  Current Outpatient Medications on File Prior to Visit   Medication Sig Dispense Refill    acetaminophen (TYLENOL) 650 mg CR tablet Take 1 tablet (650 mg  total) by mouth every 8 (eight) hours as needed for mild pain 30 tablet 0    ascorbic acid (VITAMIN C) 500 MG tablet Take 1 tablet (500 mg total) by mouth 2 (two) times a day 60 tablet 1    aspirin (ECOTRIN LOW STRENGTH) 81 mg EC tablet Take 1 tablet (81 mg total) by mouth 2 (two) times a day Take 1(one) 81 mg tablet twice daily x 35 days after total joint arthroplasty 70 tablet 0    co-enzyme Q-10 100 mg capsule Take 100 mg by mouth daily      docusate sodium (COLACE) 100 mg capsule Take 1 capsule (100 mg total) by mouth 2 (two) times a day 10 capsule 0    ezetimibe (ZETIA) 10 mg tablet Take 1 tablet (10 mg total) by mouth daily 90 tablet 1    ketoconazole (NIZORAL) 2 % shampoo if needed      Multiple Vitamins-Minerals (MULTIVITAMIN ADULT) TABS Take 1 tablet by mouth daily      Omega-3 Fatty Acids (FISH OIL) 1200 MG CAPS Take 2 capsules by mouth daily       Vitamin D, Cholecalciferol, 25 MCG (1000 UT) TABS in the morning      ferrous sulfate 324 (65 Fe) mg Take 1 tablet (324 mg total) by mouth 2 (two) times a day before meals (Patient not taking: Reported on 5/7/2024) 60 tablet 1    folic acid (FOLVITE) 1 mg tablet Take 1 tablet (1 mg total) by mouth daily (Patient not taking: Reported on 5/7/2024) 30 tablet 1    methylPREDNISolone 4 MG tablet therapy pack Use as directed on package (Patient not taking: Reported on 5/7/2024) 1 each 0    ondansetron (ZOFRAN) 4 mg tablet Take 1 tablet (4 mg total) by mouth every 8 (eight) hours as needed for nausea or vomiting (Patient not taking: Reported on 5/7/2024) 5 tablet 0    oxyCODONE (ROXICODONE) 5 immediate release tablet Take 1 tablets every 6 hrs as needed for pain control (Patient not taking: Reported on 5/7/2024) 30 tablet 0     No current facility-administered medications on file prior to visit.       Recent Labs (HCT,HGB,PT,INR,ESR,CRP,GLU,HgA1C)  0   Lab Value Date/Time    HCT 47.6 03/28/2024 1025    HGB 15.4 03/28/2024 1025    WBC 5.41 03/28/2024 1025    INR 1.02  03/28/2024 1025    CRP 5.6 11/05/2020 0945    HGBA1C 5.2 03/28/2024 1025           Body mass index is 26.98 kg/m².  Wt Readings from Last 3 Encounters:   05/07/24 85.3 kg (188 lb)   04/01/24 85.3 kg (188 lb)   04/09/24 85.6 kg (188 lb 12.8 oz)       Physical exam   General: Awake, Alert, Oriented   Eyes: Pupils equal, round and reactive to light    Heart: regular rate and rhythm   Lungs: No audible wheezing   Abdomen: soft  left Lower extremity      Incision well approximated without erythema no tenderness to palpation   5 degrees off of Full knee extension 105 of flexion   Active ankle dorsal and plantarflexion without pain, calf nontender palpation   sensation mildly decreased nancy-incisionally otherwise intact   Distal pulses present      Imaging  X rays of the left knee reviewed.  X rays reveal excellently aligned left total knee arthroplasty without evidence of loosening or osseous abnormality.    Assessment:  Status post 2 weeks left TKA    Plan:  Weight bearing as tolerated left Lower extremity  Physical therapy  [employer] antibiotic prophylaxis recommended  Pain medication as needed  Follow-up in 2 months and repeat x-rays left knee

## 2024-05-09 ENCOUNTER — OFFICE VISIT (OUTPATIENT)
Dept: PHYSICAL THERAPY | Facility: CLINIC | Age: 79
End: 2024-05-09
Payer: MEDICARE

## 2024-05-09 DIAGNOSIS — Z96.652 TOTAL KNEE REPLACEMENT STATUS, LEFT: ICD-10-CM

## 2024-05-09 DIAGNOSIS — M17.12 PRIMARY OSTEOARTHRITIS OF LEFT KNEE: Primary | ICD-10-CM

## 2024-05-09 PROCEDURE — 97110 THERAPEUTIC EXERCISES: CPT

## 2024-05-09 PROCEDURE — 97116 GAIT TRAINING THERAPY: CPT

## 2024-05-09 PROCEDURE — 97140 MANUAL THERAPY 1/> REGIONS: CPT

## 2024-05-09 NOTE — PROGRESS NOTES
"Daily Note     Today's date: 2024  Patient name: Reji Henry  : 1945  MRN: [id number]  Referring provider: Poli Dawson*  Dx:   Encounter Diagnosis     ICD-10-CM    1. Primary osteoarthritis of left knee  M17.12       2. Total knee replacement status, left  Z96.652                        Subjective: Pt reports good compliance with HEP.     Objective: See treatment diary below    PROM 5-109 deg    Assessment: Pt demonstrated decreased UE reliance with FWW and good stability with stair training. Pt demonstrated no significant change in knee PROM.     Plan: Cont. POC. Consider trialing SPC ambulation nv.    1:1 11:30-11:59, unbilled 12-:08, pt supervised by LISA PTA 12:-:25, 1:1 12:26-12:34     Precautions: PMHx: HTN, prostate CA  Dx: s/p L TKA 24    Manuals 4/15 4/25 4/29 5/ 5/6 5/9       L knee PROM  10 min 10 min 10 min 10 min 10 min                                              Neuro Re-Ed                                                                                                        Ther Ex             Bike: rocking  L1  5 min L1  5 min L1  5 min L1 5 min L1 5 min       Quad sets 5\"x10 5\"x15 5\"x15 5\"x20 5\"x20 5\"x20       Supine GA stretch 10\"x3 10\"x3 20\"x4 20\"x4 20\"x4 20\"x4       Heel slides 5\"x10 5\"x10 5\"x15 5\"x20 5\"x20 5\"x20       SAQ 1x10   1#/ 3x10 1#/ 3x10 2#  3x10       LAQ  2x10 3x10 1#/ 3x10 1#/ 3x10 2#  3x10       Seated L peroneal nerve glides 1x10 D/c                        Ther Activity             Post-Op Self-Care 10 min                         Gait Training             Gait training on floor  FWW  300 ft FWW  300 ft  ft FWW 300ft  ft SPC nv      Step-ups  4\"/  1x10 4\"/  1x12 4\"/  1x10    6\"/  1x9 4\"/ 1x10    6\"/ 1x10 4\"/ 1x10    6\"/ 1x10       Step-downs  4\"/  1x1*  4\"/  1x10 4\"/ 1x10 4\"/ 1x10                    Modalities                                            "

## 2024-05-10 ENCOUNTER — TELEPHONE (OUTPATIENT)
Dept: OBGYN CLINIC | Facility: HOSPITAL | Age: 79
End: 2024-05-10

## 2024-05-10 NOTE — TELEPHONE ENCOUNTER
Caller: Patient    Doctor: Marco Antonio    Reason for call: Patient called stated he had sx on 4/22/24 left knee and today he noticed a bloody discharge after he showered at the incision. Please advise.    Call back#: [phone]

## 2024-05-13 ENCOUNTER — OFFICE VISIT (OUTPATIENT)
Dept: PHYSICAL THERAPY | Facility: CLINIC | Age: 79
End: 2024-05-13
Payer: MEDICARE

## 2024-05-13 DIAGNOSIS — M17.12 PRIMARY OSTEOARTHRITIS OF LEFT KNEE: Primary | ICD-10-CM

## 2024-05-13 DIAGNOSIS — Z01.818 PREOP TESTING: ICD-10-CM

## 2024-05-13 DIAGNOSIS — Z96.652 TOTAL KNEE REPLACEMENT STATUS, LEFT: ICD-10-CM

## 2024-05-13 PROCEDURE — 97116 GAIT TRAINING THERAPY: CPT | Performed by: PHYSICAL THERAPIST

## 2024-05-13 PROCEDURE — 97140 MANUAL THERAPY 1/> REGIONS: CPT | Performed by: PHYSICAL THERAPIST

## 2024-05-13 PROCEDURE — 97110 THERAPEUTIC EXERCISES: CPT | Performed by: PHYSICAL THERAPIST

## 2024-05-13 NOTE — PROGRESS NOTES
"Daily Note     Today's date: 2024  Patient name: Reji Henry  : 1945  MRN: [id number]  Referring provider: Poli Dawson*  Dx:   Encounter Diagnosis     ICD-10-CM    1. Primary osteoarthritis of left knee  M17.12       2. Preop testing  Z01.818       3. Total knee replacement status, left  Z96.652                        Subjective: Pt reports less reliance on FWW.    Objective: See treatment diary below    PROM 4-111 deg    Assessment: Pt demonstrated good safety with SPC for household ambulation and slowly improving PROM.     Plan: Cont. POC.      Precautions: PMHx: HTN, prostate CA  Dx: s/p L TKA 24    Manuals 4/15 4/25 4/29 5/2 5/6 5/9 5/13      L knee PROM  10 min 10 min 10 min 10 min 10 min 10 min                                             Neuro Re-Ed                                                                                                        Ther Ex             Bike: rocking  L1  5 min L1  5 min L1  5 min L1 5 min L1 5 min L1  5 min    Full rev      Quad sets 5\"x10 5\"x15 5\"x15 5\"x20 5\"x20 5\"x20 HEP      Supine GA stretch 10\"x3 10\"x3 20\"x4 20\"x4 20\"x4 20\"x4 20\"x4      Heel slides 5\"x10 5\"x10 5\"x15 5\"x20 5\"x20 5\"x20 5\"x20      SLR       1x10      SAQ 1x10   1#/ 3x10 1#/ 3x10 2#  3x10 3#  3x10      LAQ  2x10 3x10 1#/ 3x10 1#/ 3x10 2#  3x10 3#  3x10      Seated L peroneal nerve glides 1x10 D/c                        Ther Activity             Post-Op Self-Care 10 min            STS ecc       Foam  2x10 Foam  3x10                  Gait Training             Gait training on floor  FWW  300 ft FWW  300 ft  ft FWW 300ft  ft  ft      Step-ups  4\"/  1x10 4\"/  1x12 4\"/  1x10    6\"/  1x9 4\"/ 1x10    6\"/ 1x10 4\"/ 1x10    6\"/ 1x10 4\"/  1x10    6\"/  1x10      Step-downs  4\"/  1x1*  4\"/  1x10 4\"/ 1x10 4\"/ 1x10 4\"/  1x10    6\"/  1x10                   Modalities                                            "

## 2024-05-16 ENCOUNTER — OFFICE VISIT (OUTPATIENT)
Dept: PHYSICAL THERAPY | Facility: CLINIC | Age: 79
End: 2024-05-16
Payer: MEDICARE

## 2024-05-16 DIAGNOSIS — M17.12 PRIMARY OSTEOARTHRITIS OF LEFT KNEE: Primary | ICD-10-CM

## 2024-05-16 DIAGNOSIS — Z96.652 TOTAL KNEE REPLACEMENT STATUS, LEFT: ICD-10-CM

## 2024-05-16 DIAGNOSIS — Z01.818 PREOP TESTING: ICD-10-CM

## 2024-05-16 PROCEDURE — 97140 MANUAL THERAPY 1/> REGIONS: CPT

## 2024-05-16 PROCEDURE — 97110 THERAPEUTIC EXERCISES: CPT

## 2024-05-16 PROCEDURE — 97116 GAIT TRAINING THERAPY: CPT

## 2024-05-16 NOTE — PROGRESS NOTES
"Daily Note     Today's date: 2024  Patient name: Reji Henry  : 1945  MRN: [id number]  Referring provider: Poli Dawson*  Dx:   No diagnosis found.                   Subjective: Pt states he has been trying to use the cane at home more and stairs as well. Has not alternated     Objective: See treatment diary below    PROM 4-111 deg    Assessment: Tolerated session fairly well. Ambulated well outside with trial of cane. Curbs, sidewalk, etc. Active knee flexion, 120, ext -5. No other increased symptoms or pain noted.     Plan: Cont. POC.      Precautions: PMHx: HTN, prostate CA  Dx: s/p L TKA 24    Manuals 4/15 4/25 4/29 5/2 5/6 5/9 5/13 5/16     L knee PROM  10 min 10 min 10 min 10 min 10 min 10 min X 10 min                                             Neuro Re-Ed                                                                                                        Ther Ex             Bike: rocking  L1  5 min L1  5 min L1  5 min L1 5 min L1 5 min L1  5 min    Full rev L 1 x 5 min   Full rev     Quad sets 5\"x10 5\"x15 5\"x15 5\"x20 5\"x20 5\"x20 HEP      Supine GA stretch 10\"x3 10\"x3 20\"x4 20\"x4 20\"x4 20\"x4 20\"x4 20\" x 4     Heel slides 5\"x10 5\"x10 5\"x15 5\"x20 5\"x20 5\"x20 5\"x20 5\" x 20      SLR       1x10 1 x 10     SAQ 1x10   1#/ 3x10 1#/ 3x10 2#  3x10 3#  3x10 3# 3 x 10      LAQ  2x10 3x10 1#/ 3x10 1#/ 3x10 2#  3x10 3#  3x10 3# 3 x 10      Seated L peroneal nerve glides 1x10 D/c                        Ther Activity             Post-Op Self-Care 10 min            STS ecc       Foam  2x10 NV                  Gait Training             Gait training on floor  FWW  300 ft FWW  300 ft  ft FWW 300ft  ft  ft SPC outside today      Step-ups  4\"/  1x10 4\"/  1x12 4\"/  1x10    6\"/  1x9 4\"/ 1x10    6\"/ 1x10 4\"/ 1x10    6\"/ 1x10 4\"/  1x10    6\"/  1x10 4\" 1 x 10     6\" 1 x 10      Step-downs  4\"/  1x1*  4\"/  1x10 4\"/ 1x10 4\"/ 1x10 4\"/  1x10    6\"/  1x10 4\" 1 x 10     6\" 1 x 10        "            Modalities

## 2024-05-20 ENCOUNTER — OFFICE VISIT (OUTPATIENT)
Dept: PHYSICAL THERAPY | Facility: CLINIC | Age: 79
End: 2024-05-20
Payer: MEDICARE

## 2024-05-20 DIAGNOSIS — Z96.652 TOTAL KNEE REPLACEMENT STATUS, LEFT: ICD-10-CM

## 2024-05-20 DIAGNOSIS — M17.12 PRIMARY OSTEOARTHRITIS OF LEFT KNEE: Primary | ICD-10-CM

## 2024-05-20 PROCEDURE — 97110 THERAPEUTIC EXERCISES: CPT

## 2024-05-20 NOTE — PROGRESS NOTES
"Daily Note     Today's date: 2024  Patient name: Reji Henry  : 1945  MRN: [id number]  Referring provider: Poli Dawson*  Dx:   Encounter Diagnosis     ICD-10-CM    1. Primary osteoarthritis of left knee  M17.12       2. Total knee replacement status, left  Z96.652                          Subjective: Pt reports he doesn't need his SPC most of the time unless he has been sitting for a long time and needs it initially to get moving.     Objective: See treatment diary below    PROM 4-117 deg    Assessment: Pt demonstrated increased knee PROM and increased stair tolerance. Quad control is increasing with step downs.     Plan: Cont. POC.      Precautions: PMHx: HTN, prostate CA  Dx: s/p L TKA 24    Manuals 4/15 4/25 4/29 5/2 5/6 5/9 5/13 5/16 5/20    L knee PROM  10 min 10 min 10 min 10 min 10 min 10 min X 10 min  10 min                                           Neuro Re-Ed                                                                                                        Ther Ex             Bike: rocking  L1  5 min L1  5 min L1  5 min L1 5 min L1 5 min L1  5 min    Full rev L 1 x 5 min   Full rev L 1 x 5 min   Full rev    Quad sets 5\"x10 5\"x15 5\"x15 5\"x20 5\"x20 5\"x20 HEP      Supine GA stretch 10\"x3 10\"x3 20\"x4 20\"x4 20\"x4 20\"x4 20\"x4 20\" x 4 20\"x4    Heel slides 5\"x10 5\"x10 5\"x15 5\"x20 5\"x20 5\"x20 5\"x20 5\" x 20  5\"x20    SLR       1x10 1 x 10 2x10    SAQ 1x10   1#/ 3x10 1#/ 3x10 2#  3x10 3#  3x10 3# 3 x 10  4#  3x10    LAQ  2x10 3x10 1#/ 3x10 1#/ 3x10 2#  3x10 3#  3x10 3# 3 x 10  4#  3x10    Seated L peroneal nerve glides 1x10 D/c                        Ther Activity             Post-Op Self-Care 10 min            STS ecc       Foam  2x10 NV Foam 2x10                 Gait Training             Gait training on floor  FWW  300 ft FWW  300 ft  ft FWW 300ft  ft  ft SPC outside today  SPC  1x150 ft    Step-ups  4\"/  1x10 4\"/  1x12 4\"/  1x10    6\"/  1x9 4\"/ 1x10    6\"/ " "1x10 4\"/ 1x10    6\"/ 1x10 4\"/  1x10    6\"/  1x10 4\" 1 x 10     6\" 1 x 10  4\" 1 x 10     6\" 1 x 10     8\" 1x10    Step-downs  4\"/  1x1*  4\"/  1x10 4\"/ 1x10 4\"/ 1x10 4\"/  1x10    6\"/  1x10 4\" 1 x 10     6\" 1 x 10  4\" 1 x 10     6\" 1 x 10     8\" 1x10                 Modalities                                            "

## 2024-05-23 ENCOUNTER — OFFICE VISIT (OUTPATIENT)
Dept: PHYSICAL THERAPY | Facility: CLINIC | Age: 79
End: 2024-05-23
Payer: MEDICARE

## 2024-05-23 DIAGNOSIS — M17.12 PRIMARY OSTEOARTHRITIS OF LEFT KNEE: Primary | ICD-10-CM

## 2024-05-23 DIAGNOSIS — Z96.652 TOTAL KNEE REPLACEMENT STATUS, LEFT: ICD-10-CM

## 2024-05-23 PROCEDURE — 97110 THERAPEUTIC EXERCISES: CPT

## 2024-05-23 PROCEDURE — 97116 GAIT TRAINING THERAPY: CPT

## 2024-05-23 NOTE — PROGRESS NOTES
"Daily Note     Today's date: 2024  Patient name: Reji Henry  : 1945  MRN: [id number]  Referring provider: Poli Dawson*  Dx:   Encounter Diagnosis     ICD-10-CM    1. Primary osteoarthritis of left knee  M17.12       2. Total knee replacement status, left  Z96.652                          Subjective: Pt reports no longer using SPC for walking in the house.      Objective: See treatment diary below    PROM 4-118 deg    Assessment: Pt demonstrated slowly increasing knee PROM and good stability ambulating without an AD indoors.     Plan: Cont. POC.      Precautions: PMHx: HTN, prostate CA  Dx: s/p L TKA 24    Manuals 4/15 4/25 4/29 5/2 5/6 5/9 5/13 5/16 5/20 5/23   L knee PROM  10 min 10 min 10 min 10 min 10 min 10 min X 10 min  10 min 10 min                                          Neuro Re-Ed                                                                                                        Ther Ex             Bike: rocking  L1  5 min L1  5 min L1  5 min L1 5 min L1 5 min L1  5 min    Full rev L 1 x 5 min   Full rev L 1 x 5 min   Full rev L 1 x 5 min   Full rev   Quad sets 5\"x10 5\"x15 5\"x15 5\"x20 5\"x20 5\"x20 HEP      Supine GA stretch 10\"x3 10\"x3 20\"x4 20\"x4 20\"x4 20\"x4 20\"x4 20\" x 4 20\"x4 20\"x4   Heel slides 5\"x10 5\"x10 5\"x15 5\"x20 5\"x20 5\"x20 5\"x20 5\" x 20  5\"x20 5\"x20   SLR       1x10 1 x 10 2x10 2x10   SAQ 1x10   1#/ 3x10 1#/ 3x10 2#  3x10 3#  3x10 3# 3 x 10  4#  3x10 4#  3x10   LAQ  2x10 3x10 1#/ 3x10 1#/ 3x10 2#  3x10 3#  3x10 3# 3 x 10  4#  3x10 4#  3x10   Seated L peroneal nerve glides 1x10 D/c           Seated knee extension heel prop          2#  3 min w/ ice   Ther Activity             Post-Op Self-Care 10 min            STS ecc       Foam  2x10 NV Foam 2x10 Foam  3 x10                Gait Training             Gait training on floor  FWW  300 ft FWW  300 ft  ft FWW 300ft  ft  ft SPC outside today  SPC  1x150 ft SPC  1x75 ft  No AD  1x150 ft " "  Step-ups  4\"/  1x10 4\"/  1x12 4\"/  1x10    6\"/  1x9 4\"/ 1x10    6\"/ 1x10 4\"/ 1x10    6\"/ 1x10 4\"/  1x10    6\"/  1x10 4\" 1 x 10     6\" 1 x 10  4\" 1 x 10     6\" 1 x 10     8\" 1x10 4\" 1 x 10     6\" 1 x 10     8\" 1x10   Step-downs  4\"/  1x1*  4\"/  1x10 4\"/ 1x10 4\"/ 1x10 4\"/  1x10    6\"/  1x10 4\" 1 x 10     6\" 1 x 10  4\" 1 x 10     6\" 1 x 10     8\" 1x10 4\" 1 x 10     6\" 1 x 10     8\" 1x10                Modalities                                            "

## 2024-05-28 ENCOUNTER — OFFICE VISIT (OUTPATIENT)
Dept: PHYSICAL THERAPY | Facility: CLINIC | Age: 79
End: 2024-05-28
Payer: MEDICARE

## 2024-05-28 DIAGNOSIS — M17.12 PRIMARY OSTEOARTHRITIS OF LEFT KNEE: Primary | ICD-10-CM

## 2024-05-28 DIAGNOSIS — Z96.652 TOTAL KNEE REPLACEMENT STATUS, LEFT: ICD-10-CM

## 2024-05-28 PROCEDURE — 97140 MANUAL THERAPY 1/> REGIONS: CPT | Performed by: PHYSICAL THERAPIST

## 2024-05-28 PROCEDURE — 97116 GAIT TRAINING THERAPY: CPT | Performed by: PHYSICAL THERAPIST

## 2024-05-28 PROCEDURE — 97110 THERAPEUTIC EXERCISES: CPT | Performed by: PHYSICAL THERAPIST

## 2024-05-28 NOTE — PROGRESS NOTES
"Daily Note     Today's date: 2024  Patient name: Reji Henry  : 1945  MRN: [id number]  Referring provider: Poli Dawson*  Dx:   Encounter Diagnosis     ICD-10-CM    1. Primary osteoarthritis of left knee  M17.12       2. Total knee replacement status, left  Z96.652                          Subjective: Pt reports now able to use reciprocal pattern on stairs.     Objective: See treatment diary below    PROM 4-119 deg    Assessment: Pt demonstrated safe gait over sidewalks and curbs without AD as well as improved gait stability on stairs.    Plan: Cont. POC.      Precautions: PMHx: HTN, prostate CA  Dx: s/p L TKA 24    Manuals    L knee PROM 10 min         10 min                                          Neuro Re-Ed                                                                                                        Ther Ex             Bike L1  5 min         L 1 x 5 min   Full rev   Quad sets 5\"x20            Supine GA stretch 20\"x4         20\"x4   Supine HA stretch 20\"x4            Heel slides 5\"x20         5\"x20   SLR 3x10         2x10   SAQ nv         4#  3x10   LAQ nv         4#  3x10   Seated L peroneal nerve glides             Seated knee extension heel prop          2#  3 min w/ ice   Ther Activity                          STS ecc No foam    3x10         Foam  3 x10                Gait Training             Gait training on floor          SPC  1x75 ft  No AD  1x150 ft   Step-ups 8\"/  1x14 8\"/  2x10 8\"/  3x10       4\" 1 x 10     6\" 1 x 10     8\" 1x10   Step-downs 8\"/  1x14 8\"/  2x10 8\"  3x10       4\" 1 x 10     6\" 1 x 10     8\" 1x10   Step-ups with \"/  1x15            Gait training outside: grass, sidewalks No AD  8 min                         Modalities                                            "

## 2024-05-31 ENCOUNTER — APPOINTMENT (OUTPATIENT)
Dept: PHYSICAL THERAPY | Facility: CLINIC | Age: 79
End: 2024-05-31
Payer: MEDICARE

## 2024-06-03 ENCOUNTER — EVALUATION (OUTPATIENT)
Dept: PHYSICAL THERAPY | Facility: CLINIC | Age: 79
End: 2024-06-03
Payer: MEDICARE

## 2024-06-03 DIAGNOSIS — M17.12 PRIMARY OSTEOARTHRITIS OF LEFT KNEE: Primary | ICD-10-CM

## 2024-06-03 DIAGNOSIS — Z96.652 TOTAL KNEE REPLACEMENT STATUS, LEFT: ICD-10-CM

## 2024-06-03 PROCEDURE — 97110 THERAPEUTIC EXERCISES: CPT | Performed by: PHYSICAL THERAPIST

## 2024-06-03 PROCEDURE — 97116 GAIT TRAINING THERAPY: CPT | Performed by: PHYSICAL THERAPIST

## 2024-06-03 PROCEDURE — 97140 MANUAL THERAPY 1/> REGIONS: CPT | Performed by: PHYSICAL THERAPIST

## 2024-06-03 NOTE — PROGRESS NOTES
PT Re-Evaluation     Today's date: 6/3/2024  Patient name: Reji Henry  : 1945  MRN: [id number]  Referring provider: Poli Dawson*  Dx:   Encounter Diagnosis     ICD-10-CM    1. Primary osteoarthritis of left knee  M17.12       2. Total knee replacement status, left  Z96.652                      Assessment  Impairments: abnormal gait, abnormal or restricted ROM, activity intolerance, impaired physical strength, lacks appropriate home exercise program, pain with function, weight-bearing intolerance, poor posture  and poor body mechanics    Assessment details: Pt demonstrated improved ROM, strength, gait, and pain. He will benefit from 2-4 more weeks of skilled PT services to address his remaining impairments in order to further normalize ROM and function before d/c to a maintenance HEP at that time.  Understanding of Dx/Px/POC: good     Prognosis: good    Goals  STG - 2-4 wks  1) pt will d/c FWW (met)  2) pt will demonstrate PROM 5-100 deg (met)  3) pt will report a 1/4 mile ambulation tolerance (met)    MTG - 4-8 wks  1) pt will d/c SPC (met)  2) pt will demonstrate PROM 3-110 deg (met)  3) pt will report a 1/2 mile ambulation tolerance (met)    LTG - 6-12 wks  1) pt will normalize gait and stairs  2) pt will demonstrate PROM 0-120 deg  3) pt will report a 1 mile ambulation tolerance    Plan  Planned modality interventions: cryotherapy    Planned therapy interventions: joint mobilization, manual therapy, neuromuscular re-education, patient education, postural training, strengthening, stretching, therapeutic activities, therapeutic exercise, home exercise program, gait training, functional ROM exercises, flexibility, body mechanics training and balance    Frequency: 2x week  Duration in weeks: 4  Treatment plan discussed with: patient        Subjective Evaluation    History of Present Illness  Mechanism of injury: Pt is a 78 y.o. male with PMHx significant for HTN, prostate CA. He reports  "d/c'ing SPC, more normalized stairs at home, and low pain.  Patient Goals  Patient goals for therapy: decreased edema, improved balance, decreased pain, increased motion, increased strength, independence with ADLs/IADLs and return to sport/leisure activities    Pain  Current pain ratin  At best pain ratin  At worst pain ratin  Location: L knee generalized ant > post  Quality: dull ache  Relieving factors: rest  Aggravating factors: standing, walking and stair climbing  Progression: improved    Social Support  Steps to enter house: yes  2  Stairs in house: yes   14  Lives in: multiple-level home  Lives with: spouse    Employment status: not working    Diagnostic Tests  X-ray: abnormal (severe lateral L knee OA, valgus deformity)  Treatments  Previous treatment: physical therapy  Discharged from (in last 30 days): inpatient hospitalization        Objective     Observations   Left Knee   Positive for deformity (mild valgus) and edema (1+). Negative for drainage.     Active Range of Motion   Left Knee   Flexion: 112 degrees   Extension: 4 degrees     Passive Range of Motion   Left Knee   Flexion: 120 degrees with pain  Extension: 2 degrees with pain    Strength/Myotome Testing     Left Hip   Planes of Motion   Flexion: 4-  Extension: 4  Abduction: 4-  Adduction: 4    Left Knee   Flexion: 4  Extension: 4  Quadriceps contraction: fair    General Comments:      Knee Comments  Gait no AD: good stability, decreased gait speed on grass and sidewalks    Step-ups/downs 8\" reciprocal gait pattern, U rail for balance             Precautions: PMHx: HTN, prostate CA  Dx: s/p L TKA 24    Manuals 6/3            L knee PROM 10 min                                                   Neuro Re-Ed                                                                                                        Ther Ex             Bike L1  5 min            Quad sets 5\"x20            Supine GA stretch 20\"x4            Supine HA stretch " "20\"x4            Heel slides 5\"x20            SLR 3x10            SAQ 5#  3x10            LAQ 5#  3x10                                      Ther Activity                                       Gait Training             Gait training outside No AD  8 min D/c           Step-ups 8\"/  2x10            Step-downs 8\"/  2x10            Step-ups with march 8\"/  1x15                         Modalities                                            "

## 2024-06-05 ENCOUNTER — APPOINTMENT (OUTPATIENT)
Dept: LAB | Facility: CLINIC | Age: 79
End: 2024-06-05
Payer: MEDICARE

## 2024-06-05 DIAGNOSIS — I10 BENIGN ESSENTIAL HTN: ICD-10-CM

## 2024-06-05 DIAGNOSIS — E78.00 HYPERCHOLESTEROLEMIA: ICD-10-CM

## 2024-06-05 DIAGNOSIS — C61 PROSTATE CANCER (HCC): ICD-10-CM

## 2024-06-05 DIAGNOSIS — M17.12 PRIMARY OSTEOARTHRITIS OF LEFT KNEE: ICD-10-CM

## 2024-06-05 LAB — PSA SERPL-MCNC: 7.67 NG/ML (ref 0–4)

## 2024-06-05 PROCEDURE — 36415 COLL VENOUS BLD VENIPUNCTURE: CPT

## 2024-06-05 PROCEDURE — 84153 ASSAY OF PSA TOTAL: CPT

## 2024-06-06 ENCOUNTER — OFFICE VISIT (OUTPATIENT)
Dept: PHYSICAL THERAPY | Facility: CLINIC | Age: 79
End: 2024-06-06
Payer: MEDICARE

## 2024-06-06 DIAGNOSIS — M17.12 PRIMARY OSTEOARTHRITIS OF LEFT KNEE: Primary | ICD-10-CM

## 2024-06-06 DIAGNOSIS — Z96.652 TOTAL KNEE REPLACEMENT STATUS, LEFT: ICD-10-CM

## 2024-06-06 PROCEDURE — 97140 MANUAL THERAPY 1/> REGIONS: CPT | Performed by: PHYSICAL THERAPIST

## 2024-06-06 PROCEDURE — 97110 THERAPEUTIC EXERCISES: CPT | Performed by: PHYSICAL THERAPIST

## 2024-06-06 PROCEDURE — 97116 GAIT TRAINING THERAPY: CPT | Performed by: PHYSICAL THERAPIST

## 2024-06-06 NOTE — PROGRESS NOTES
"Daily Note     Today's date: 2024  Patient name: Reji Henry  : 1945  MRN: [id number]  Referring provider: Poli Dawson*  Dx:   Encounter Diagnosis     ICD-10-CM    1. Primary osteoarthritis of left knee  M17.12       2. Total knee replacement status, left  Z96.652                      Subjective: Pt reports improved ability to use stairs.    Objective: See treatment diary below    PROM 1-121 deg    Assessment: Pt demonstrated improved PROM and quad stability on stairs.    Plan: Cont POC.     Precautions: PMHx: HTN, prostate CA  Dx: s/p L TKA 24    Manuals 6/3 6/6           L knee PROM 10 min 10 min                                                  Neuro Re-Ed                                                                                                        Ther Ex             Bike L1  5 min L1  5 min           Quad sets 5\"x20 5\"x20           Supine GA stretch 20\"x4 20\"x4           Supine HA stretch 20\"x4 20\"x4           Heel slides 5\"x20 5\"x20           SLR 3x10 3x10           SAQ 5#  3x10            LAQ 5#  3x10 6#  3x10                                     Ther Activity             Ecc STS  3x10                        Gait Training             Gait training outside No AD  8 min D/c           Step-ups 8\"/  2x10 8\"/  2x10           Step-downs 8\"/  2x10 8\"/  2x10           Step-ups with march 8\"/  1x15 8\"/  1x15                        Modalities                                            "

## 2024-06-10 ENCOUNTER — OFFICE VISIT (OUTPATIENT)
Dept: UROLOGY | Facility: AMBULATORY SURGERY CENTER | Age: 79
End: 2024-06-10
Payer: MEDICARE

## 2024-06-10 ENCOUNTER — OFFICE VISIT (OUTPATIENT)
Dept: PHYSICAL THERAPY | Facility: CLINIC | Age: 79
End: 2024-06-10
Payer: MEDICARE

## 2024-06-10 VITALS
HEIGHT: 70 IN | WEIGHT: 188 LBS | BODY MASS INDEX: 26.92 KG/M2 | DIASTOLIC BLOOD PRESSURE: 80 MMHG | SYSTOLIC BLOOD PRESSURE: 130 MMHG | OXYGEN SATURATION: 97 % | HEART RATE: 96 BPM

## 2024-06-10 DIAGNOSIS — M17.12 PRIMARY OSTEOARTHRITIS OF LEFT KNEE: Primary | ICD-10-CM

## 2024-06-10 DIAGNOSIS — Z96.652 TOTAL KNEE REPLACEMENT STATUS, LEFT: ICD-10-CM

## 2024-06-10 DIAGNOSIS — N40.1 BPH WITH OBSTRUCTION/LOWER URINARY TRACT SYMPTOMS: ICD-10-CM

## 2024-06-10 DIAGNOSIS — N13.8 BPH WITH OBSTRUCTION/LOWER URINARY TRACT SYMPTOMS: ICD-10-CM

## 2024-06-10 DIAGNOSIS — C61 PROSTATE CANCER (HCC): Primary | ICD-10-CM

## 2024-06-10 PROCEDURE — 99213 OFFICE O/P EST LOW 20 MIN: CPT

## 2024-06-10 PROCEDURE — 97110 THERAPEUTIC EXERCISES: CPT

## 2024-06-10 RX ORDER — ALFUZOSIN HYDROCHLORIDE 10 MG/1
TABLET, EXTENDED RELEASE ORAL
Qty: 90 TABLET | Refills: 3 | Status: SHIPPED | OUTPATIENT
Start: 2024-06-10

## 2024-06-10 RX ORDER — ALFUZOSIN HYDROCHLORIDE 10 MG/1
10 TABLET, EXTENDED RELEASE ORAL DAILY
Qty: 30 TABLET | Refills: 0 | Status: SHIPPED | OUTPATIENT
Start: 2024-06-10 | End: 2024-06-10

## 2024-06-10 NOTE — ASSESSMENT & PLAN NOTE
Patient has known prostatomegaly with his recent MRI of the prostate from April 2023 sizing his prostate at 92 g  The patient reports weakened urinary stream, specifically at nighttime.  Additionally, the patient notes that he gets up 2-3 times to urinate.  AUA symptom score is 14  We discussed trialing medications such as tamsulosin or alfuzosin, which relax the smooth muscle of the prostate and open up the bladder outlet.  We discussed that the side effects of such medications include feeling washed out up to a week or 2 after initiating the medication as well as retrograde ejaculation.  The patient will trial alfuzosin once daily after dinner and before bed and return in 6 months to reassess patient's urinary frequency/urgency

## 2024-06-10 NOTE — PROGRESS NOTES
6/10/2024      Assessment and Plan    78 y.o. male managed by Dr. Recinos    Prostate cancer ([facility])  Patient has a history of [place] 3+3 equal 6 prostate cancer as well as 1 core showing [place] 3+4 equal 7 prostate cancer from prostate biopsy performed in 2021.  From prostate biopsy performed in 2021.  Patient has a significant history of elevated PSA with multiple biopsies.  Transrectal ultrasound-guided biopsy in 2019 was negative for malignancy but did demonstrate atypical cells  MRI fusion prostate biopsy performed in 2021 revealed 3 cores of [place] 3+3 equal 6 as well as 1 core of [place] 3+4 equal 7 prostate cancer  Repeat MRI fusion prostate biopsy in June 2022 resulted in all cores negative for malignancy but 1 core revealing high-grade prostatic intraepithelial neoplasia  Prolaris genomic testing from the patient's 2021 biopsy sample revealed a 2% risk of 10-year disease specific mortality as well as a less than 1% chance of metastatic disease with primary treatment at 10 years.   The patient underwent repeat multiparametric MRI of the prostate on 4/14/2023 which revealed known clinically significant prostate cancer. Stable 1.6 cm lesion in the anterior peripheral zone.  Additionally, at size his prostate at 92 g.  The patient underwent a repeat PSA testing in March 2024 and his PSA was found to be significantly elevated at 7.75 from his previous 6.48 in October 2023.  The patient was then advised to undergo repeat PSA testing she had done on June 5, 2024 which was found to be 7.67.  We discussed PSA density today, stating that with a prostate as large as 92 g that it would not be abnormal for his PSA to be as high as 9.2.  However, given his previous diagnosis of [place] 3+3 equal 6 prostate cancer as well as 1 core showing [place] 3+4 equal 7 prostate cancer and numerous multiparametric MRIs of the prostate showing PI-RADS category 5, that with a PSA velocity greater than 1 in less than 1 year that it  would be warranted to undergo repeat multiparametric MRI.  We discussed that the patient should undergo repeat multiparametric MRI of the prostate to reevaluate the suspicious lesion that was noted a year ago in light of his significant rise in PSA.  Additionally, we discussed that if the multiparametric MRI of the prostate showed PI-RADS category 5 again, that we would proceed with a MRI fusion biopsy in the operating room once more.  The patient was agreeable and amenable to this plan.    BPH with obstruction/lower urinary tract symptoms  Patient has known prostatomegaly with his recent MRI of the prostate from April 2023 sizing his prostate at 92 g  The patient reports weakened urinary stream, specifically at nighttime.  Additionally, the patient notes that he gets up 2-3 times to urinate.  AUA symptom score is 14  We discussed trialing medications such as tamsulosin or alfuzosin, which relax the smooth muscle of the prostate and open up the bladder outlet.  We discussed that the side effects of such medications include feeling washed out up to a week or 2 after initiating the medication as well as retrograde ejaculation.  The patient will trial alfuzosin once daily after dinner and before bed and return in 6 months to reassess patient's urinary frequency/urgency        History of Present Illness  Reji Henry is a 78 y.o. male here for evaluation of history of [place] 3+3 = 6 prostate cancer as well as 1 core showing [place] 3+4 equal 7 prostate cancer in 2021.  Patient has a significant history of elevated PSA which prompted a transrectal ultrasound-guided biopsy in 2019 which was negative for malignancy but which did demonstrate atypical cells which prompted a multi parametric MRI when his PSA chantal to 7.  That first multi parametric MRI in 1/2021 revealed a PI-RADS 5 lesion in the anterior peripheral zone of the prostate apex.  These findings prompted MRI fusion biopsy performed in 2021 by Dr. Ortega which  revealed 3 cores of [place] 3+3 disease as well as 1 core of [place] 3+4 disease.  At that time the patient opted to proceed with active surveillance versus definitive treatment for his prostate cancer.  Genomic testing at that time revealed a 2% risk of 10-year disease specific mortality as well as a less than 1% chance of metastatic disease with primary treatment of 10 years.  Repeat MRI of the prostate performed in April 2022 revealed no significant interval change in the previously seen lesion at the prostatic apex in the anterior peripheral zone extending into the anterior transition zone.  Repeat MRI fusion prostate biopsy in June 2022 with all cores negative for malignancy but 1 core revealing high-grade prostatic intraepithelial neoplasia.  Returns today for a 6-month follow-up with PSA.  PSA at last visit was 6.48 in October 2023 which was elevated from his previous PSA of 5.7 in April 2023.  Patient underwent multiparametric MRI of the prostate in April 2023 which demonstrated stability of radiographic features of 1.6 cm lesion in the anterior peripheral zone.  Since his last visit, the patient underwent repeat PSA testing in March 2024 and his PSA was found to be significantly elevated at 7.75 from his previous 6.48 in October 2023.  Patient was then advised to undergo repeat PSA testing which he had done on June 5, 2024 which was found to be 7.67.  Additionally, the patient reports a weakened urinary stream mainly at night and states that it is slow to get started after waking up to urinate.  Patient also notes that he gets up about 2-3 times a night to use the restroom.  Otherwise, the patient is unbothered by his urinary frequency/urgency at this time.      Review of Systems   Constitutional:  Negative for chills and fever.   HENT:  Negative for ear pain and sore throat.    Eyes:  Negative for pain and visual disturbance.   Respiratory:  Negative for cough and shortness of breath.    Cardiovascular:   "Negative for chest pain and palpitations.   Gastrointestinal:  Negative for abdominal pain and vomiting.   Genitourinary:  Positive for difficulty urinating (Weakened stream). Negative for dysuria, flank pain, frequency, hematuria and urgency.   Musculoskeletal:  Negative for arthralgias and back pain.   Skin:  Negative for color change and rash.   Neurological:  Negative for seizures and syncope.   All other systems reviewed and are negative.          AUA SYMPTOM SCORE      Flowsheet Row Most Recent Value   AUA SYMPTOM SCORE    How often have you had a sensation of not emptying your bladder completely after you finished urinating? 1 (P)     How often have you had to urinate again less than two hours after you finished urinating? 2 (P)     How often have you found you stopped and started again several times when you urinate? 1 (P)     How often have you found it difficult to postpone urination? 1 (P)     How often have you had a weak urinary stream? 4 (P)     How often have you had to push or strain to begin urination? 0 (P)     How many times did you most typically get up to urinate from the time you went to bed at night until the time you got up in the morning? 5 (P)     Quality of Life: If you were to spend the rest of your life with your urinary condition just the way it is now, how would you feel about that? 2 (P)     AUA SYMPTOM SCORE 14 (P)               Vitals  Vitals:    06/10/24 1252   BP: 130/80   BP Location: Left arm   Patient Position: Sitting   Cuff Size: Standard   Pulse: 96   SpO2: 97%   Weight: 85.3 kg (188 lb)   Height: 5' 10\" (1.778 m)       Physical Exam  Vitals reviewed.   Constitutional:       General: He is not in acute distress.     Appearance: Normal appearance. He is not ill-appearing.   HENT:      Head: Normocephalic and atraumatic.      Nose: Nose normal.   Eyes:      General: No scleral icterus.  Pulmonary:      Effort: No respiratory distress.   Abdominal:      General: Abdomen is " flat. There is no distension.      Palpations: Abdomen is soft.      Tenderness: There is no abdominal tenderness.   Genitourinary:     Comments: Normal phallus, testes descended bilaterally and smooth without nodularity.  LEBRON performed today.  Prostate estimated to be about 80 g and diffusely firm without notable nodules or indurations.  Musculoskeletal:         General: Normal range of motion.      Cervical back: Normal range of motion.   Skin:     General: Skin is warm.      Coloration: Skin is not jaundiced.   Neurological:      Mental Status: He is alert and oriented to person, place, and time.      Gait: Gait normal.   Psychiatric:         Mood and Affect: Mood normal.         Behavior: Behavior normal.           Past History  Past Medical History:   Diagnosis Date    Allergic Spring pollens - years ago    Arthritis 2018    Knee    Basal cell carcinoma of nose     removed 07/2015    BPH with elevated PSA 12/11/2018    Cancer (HCC)     prostate    Hiatal hernia     sliding -- states no GERD symptoms, takes no meds    Hypercholesterolemia     Hyperlipidemia     Mitral valve disorder     Osteoarthritis     Seasonal allergies     resolved 01/21/2015     Social History     Socioeconomic History    Marital status: /Civil Union     Spouse name: None    Number of children: None    Years of education: None    Highest education level: None   Occupational History    Occupation:    Tobacco Use    Smoking status: Never    Smokeless tobacco: Never   Vaping Use    Vaping status: Never Used   Substance and Sexual Activity    Alcohol use: Yes     Comment: Less than one drink per week    Drug use: No    Sexual activity: None   Other Topics Concern    None   Social History Narrative    Advance direct of file     Caffeine use -  He admits to consuming caffeine via coffee; 2-3 cups per week; and tea 2 cups per day     Uses safety belts      Social Determinants of Health     Financial Resource Strain: Low Risk   (12/18/2023)    Overall Financial Resource Strain (CARDIA)     Difficulty of Paying Living Expenses: Not hard at all   Food Insecurity: Not on file   Transportation Needs: No Transportation Needs (12/18/2023)    PRAPARE - Transportation     Lack of Transportation (Medical): No     Lack of Transportation (Non-Medical): No   Physical Activity: Not on file   Stress: Not on file   Social Connections: Not on file   Intimate Partner Violence: Not on file   Housing Stability: Not on file     Social History     Tobacco Use   Smoking Status Never   Smokeless Tobacco Never     Family History   Problem Relation Age of Onset    Alcohol abuse Father         independently diagnosed     Heart disease Father     Cancer Father     Hypertension Mother     COPD Mother     Diabetes type II Mother     Hypertension Maternal Grandmother     Coronary artery disease Maternal Grandmother     Diabetes Maternal Grandmother     Lung cancer Maternal Grandmother     Coronary artery disease Family     Stroke Paternal Grandmother     Lung cancer Maternal Grandfather        The following portions of the patient's history were reviewed and updated as appropriate: allergies, current medications, past medical history, past social history, past surgical history and problem list.    Results  No results found for this or any previous visit (from the past 1 hour(s)).]  Lab Results   Component Value Date    PSA 7.67 (H) 06/05/2024    PSA 7.75 (H) 03/28/2024    PSA 6.48 (H) 10/16/2023    PSA 5.7 (H) 04/04/2023     Lab Results   Component Value Date    CALCIUM 9.1 03/28/2024    K 4.6 03/28/2024    CO2 28 03/28/2024     03/28/2024    BUN 14 03/28/2024    CREATININE 0.91 03/28/2024     Lab Results   Component Value Date    WBC 5.41 03/28/2024    HGB 15.4 03/28/2024    HCT 47.6 03/28/2024    MCV 96 03/28/2024     03/28/2024

## 2024-06-10 NOTE — PROGRESS NOTES
"Daily Note     Today's date: 6/10/2024  Patient name: Reji Henry  : 1945  MRN: [id number]  Referring provider: Poli Dawson*  Dx:   Encounter Diagnosis     ICD-10-CM    1. Primary osteoarthritis of left knee  M17.12       2. Total knee replacement status, left  Z96.652                      Subjective: Pt reports still having some tightness with straightening his knee but it is slowly improving.     Objective: See treatment diary below    PROM 1-122 deg    Assessment: Pt demonstrated increased quad strength. Knee extension ROM is still limited secondary to tightness.    Plan: Cont POC.     Precautions: PMHx: HTN, prostate CA  Dx: s/p L TKA 24    Manuals 6/3 6/6 6/10          L knee PROM 10 min 10 min 10 min                                                 Neuro Re-Ed                                                                                                        Ther Ex             Bike L1  5 min L1  5 min L1 5 min          Quad sets 5\"x20 5\"x20 5\"x20          Supine GA stretch 20\"x4 20\"x4 20\"x4          Supine HA stretch 20\"x4 20\"x4 20\"x4          Heel slides 5\"x20 5\"x20 5\"x20          SLR 3x10 3x10 3x12          SAQ 5#  3x10            LAQ 5#  3x10 6#  3x10 6#  3x10                                    Ther Activity             Ecc STS  3x10 3x10                       Gait Training             Gait training outside No AD  8 min D/c           Step-ups 8\"/  2x10 8\"/  2x10 8\"/  2x10          Step-downs 8\"/  2x10 8\"/  2x10 8\"/  2x10          Step-ups with \"/  1x15 8\"/  1x15 8\"/  1x15                       Modalities                                            "

## 2024-06-10 NOTE — ASSESSMENT & PLAN NOTE
Patient has a history of [place] 3+3 equal 6 prostate cancer as well as 1 core showing [place] 3+4 equal 7 prostate cancer from prostate biopsy performed in 2021.  From prostate biopsy performed in 2021.  Patient has a significant history of elevated PSA with multiple biopsies.  Transrectal ultrasound-guided biopsy in 2019 was negative for malignancy but did demonstrate atypical cells  MRI fusion prostate biopsy performed in 2021 revealed 3 cores of [place] 3+3 equal 6 as well as 1 core of [place] 3+4 equal 7 prostate cancer  Repeat MRI fusion prostate biopsy in June 2022 resulted in all cores negative for malignancy but 1 core revealing high-grade prostatic intraepithelial neoplasia  Prolaris genomic testing from the patient's 2021 biopsy sample revealed a 2% risk of 10-year disease specific mortality as well as a less than 1% chance of metastatic disease with primary treatment at 10 years.   The patient underwent repeat multiparametric MRI of the prostate on 4/14/2023 which revealed known clinically significant prostate cancer. Stable 1.6 cm lesion in the anterior peripheral zone.  Additionally, at size his prostate at 92 g.  The patient underwent a repeat PSA testing in March 2024 and his PSA was found to be significantly elevated at 7.75 from his previous 6.48 in October 2023.  The patient was then advised to undergo repeat PSA testing she had done on June 5, 2024 which was found to be 7.67.  We discussed PSA density today, stating that with a prostate as large as 92 g that it would not be abnormal for his PSA to be as high as 9.2.  However, given his previous diagnosis of [place] 3+3 equal 6 prostate cancer as well as 1 core showing [place] 3+4 equal 7 prostate cancer and numerous multiparametric MRIs of the prostate showing PI-RADS category 5, that with a PSA velocity greater than 1 in less than 1 year that it would be warranted to undergo repeat multiparametric MRI.  We discussed that the patient should  undergo repeat multiparametric MRI of the prostate to reevaluate the suspicious lesion that was noted a year ago in light of his significant rise in PSA.  Additionally, we discussed that if the multiparametric MRI of the prostate showed PI-RADS category 5 again, that we would proceed with a MRI fusion biopsy in the operating room once more.  The patient was agreeable and amenable to this plan.

## 2024-06-13 ENCOUNTER — OFFICE VISIT (OUTPATIENT)
Dept: PHYSICAL THERAPY | Facility: CLINIC | Age: 79
End: 2024-06-13
Payer: MEDICARE

## 2024-06-13 DIAGNOSIS — M17.12 PRIMARY OSTEOARTHRITIS OF LEFT KNEE: Primary | ICD-10-CM

## 2024-06-13 DIAGNOSIS — Z96.652 TOTAL KNEE REPLACEMENT STATUS, LEFT: ICD-10-CM

## 2024-06-13 PROCEDURE — 97116 GAIT TRAINING THERAPY: CPT

## 2024-06-13 PROCEDURE — 97110 THERAPEUTIC EXERCISES: CPT

## 2024-06-13 NOTE — PROGRESS NOTES
"Daily Note     Today's date: 2024  Patient name: Reji Henry  : 1945  MRN: [id number]  Referring provider: Poli Dawson*  Dx:   Encounter Diagnosis     ICD-10-CM    1. Primary osteoarthritis of left knee  M17.12       2. Total knee replacement status, left  Z96.652                      Subjective: Pt reports he was able to power wash for a couple of hours standing without any knee pain.     Objective: See treatment diary below    PROM 1-123 deg    Assessment: Pt demonstrated increased LE strength and activity tolerance. Pt continues to demonstrate restricted knee extension ROM.     Plan: Cont POC.     Precautions: PMHx: HTN, prostate CA  Dx: s/p L TKA 24    Manuals 6/3 6/6 6/10 6/13         L knee PROM 10 min 10 min 10 min 10 min                                                Neuro Re-Ed                                                                                                        Ther Ex             Bike L1  5 min L1  5 min L1 5 min L1 5 min         Quad sets 5\"x20 5\"x20 5\"x20 5\"x20         Supine GA stretch 20\"x4 20\"x4 20\"x4 20\"x4         Supine HA stretch 20\"x4 20\"x4 20\"x4 20\"x4         Heel slides 5\"x20 5\"x20 5\"x20 5\"x20         SLR 3x10 3x10 3x12 3x12         SAQ 5#  3x10            LAQ 5#  3x10 6#  3x10 6#  3x10 7#  3x10                                   Ther Activity             Ecc STS  3x10 3x10 3x10                      Gait Training             Gait training outside No AD  8 min D/c           Step-ups 8\"/  2x10 8\"/  2x10 8\"/  2x10 8\"/ 2x10         Step-downs 8\"/  2x10 8\"/  2x10 8\"/  2x10 8\"/ 2x10         Step-ups with march 8\"/  1x15 8\"/  1x15 8\"/  1x15 8\"/ 1x15                      Modalities                                            "

## 2024-06-17 ENCOUNTER — OFFICE VISIT (OUTPATIENT)
Dept: PHYSICAL THERAPY | Facility: CLINIC | Age: 79
End: 2024-06-17
Payer: MEDICARE

## 2024-06-17 DIAGNOSIS — M17.12 PRIMARY OSTEOARTHRITIS OF LEFT KNEE: Primary | ICD-10-CM

## 2024-06-17 DIAGNOSIS — Z96.652 TOTAL KNEE REPLACEMENT STATUS, LEFT: ICD-10-CM

## 2024-06-17 PROCEDURE — 97116 GAIT TRAINING THERAPY: CPT

## 2024-06-17 PROCEDURE — 97110 THERAPEUTIC EXERCISES: CPT

## 2024-06-17 NOTE — PROGRESS NOTES
"Daily Note     Today's date: 2024  Patient name: Reji Hnery  : 1945  MRN: [id number]  Referring provider: Poli Dawson*  Dx:   Encounter Diagnosis     ICD-10-CM    1. Primary osteoarthritis of left knee  M17.12       2. Total knee replacement status, left  Z96.652           Start Time: 1130  Stop Time: 1220  Total time in clinic (min): 50 minutes    Subjective: Patient reports having no pain.     Objective: See treatment diary below  PROM 1-125 deg    Assessment: Patient required PT OP to achieve 1° of L knee extension. Making steady progress with his strength.     Plan: Cont POC.     Precautions: PMHx: HTN, prostate CA  Dx: s/p L TKA 24    Manuals 6/3 6/6 6/10 6/13 6/17        L knee PROM 10 min 10 min 10 min 10 min 10 min                                               Neuro Re-Ed                                                                                                        Ther Ex             Bike L1  5 min L1  5 min L1 5 min L1 5 min L1 5 min        Quad sets 5\"x20 5\"x20 5\"x20 5\"x20 5\"x20        Supine GA stretch 20\"x4 20\"x4 20\"x4 20\"x4 20\"x4        Supine HA stretch 20\"x4 20\"x4 20\"x4 20\"x4 20\"x4        Heel slides 5\"x20 5\"x20 5\"x20 5\"x20 5\"x20        SLR 3x10 3x10 3x12 3x12 3x12        SAQ 5#  3x10            LAQ 5#  3x10 6#  3x10 6#  3x10 7#  3x10 7#  3x10                                  Ther Activity             Ecc STS  3x10 3x10 3x10 3x10                     Gait Training             Gait training outside No AD  8 min D/c           Step-ups 8\"/  2x10 8\"/  2x10 8\"/  2x10 8\"/ 2x10 8\"/ 2x10        Step-downs 8\"/  2x10 8\"/  2x10 8\"/  2x10 8\"/ 2x10 8\"/ 2x10        Step-ups with march 8\"/  1x15 8\"/  1x15 8\"/  1x15 8\"/ 1x15 8\"/ 2x10                     Modalities                                          "

## 2024-06-19 ENCOUNTER — APPOINTMENT (OUTPATIENT)
Dept: LAB | Facility: AMBULARY SURGERY CENTER | Age: 79
End: 2024-06-19
Payer: MEDICARE

## 2024-06-19 LAB
ALBUMIN SERPL BCP-MCNC: 4.1 G/DL (ref 3.5–5)
ALP SERPL-CCNC: 82 U/L (ref 34–104)
ALT SERPL W P-5'-P-CCNC: 18 U/L (ref 7–52)
ANION GAP SERPL CALCULATED.3IONS-SCNC: 7 MMOL/L (ref 4–13)
AST SERPL W P-5'-P-CCNC: 19 U/L (ref 13–39)
BASOPHILS # BLD AUTO: 0.07 THOUSANDS/ÂΜL (ref 0–0.1)
BASOPHILS NFR BLD AUTO: 1 % (ref 0–1)
BILIRUB SERPL-MCNC: 0.61 MG/DL (ref 0.2–1)
BUN SERPL-MCNC: 14 MG/DL (ref 5–25)
CALCIUM SERPL-MCNC: 9.3 MG/DL (ref 8.4–10.2)
CHLORIDE SERPL-SCNC: 102 MMOL/L (ref 96–108)
CHOLEST SERPL-MCNC: 195 MG/DL
CO2 SERPL-SCNC: 27 MMOL/L (ref 21–32)
CREAT SERPL-MCNC: 0.86 MG/DL (ref 0.6–1.3)
EOSINOPHIL # BLD AUTO: 0.54 THOUSAND/ÂΜL (ref 0–0.61)
EOSINOPHIL NFR BLD AUTO: 10 % (ref 0–6)
ERYTHROCYTE [DISTWIDTH] IN BLOOD BY AUTOMATED COUNT: 13.2 % (ref 11.6–15.1)
GFR SERPL CREATININE-BSD FRML MDRD: 83 ML/MIN/1.73SQ M
GLUCOSE P FAST SERPL-MCNC: 94 MG/DL (ref 65–99)
HCT VFR BLD AUTO: 43.8 % (ref 36.5–49.3)
HDLC SERPL-MCNC: 44 MG/DL
HGB BLD-MCNC: 14.7 G/DL (ref 12–17)
IMM GRANULOCYTES # BLD AUTO: 0.02 THOUSAND/UL (ref 0–0.2)
IMM GRANULOCYTES NFR BLD AUTO: 0 % (ref 0–2)
LDLC SERPL CALC-MCNC: 129 MG/DL (ref 0–100)
LYMPHOCYTES # BLD AUTO: 1.29 THOUSANDS/ÂΜL (ref 0.6–4.47)
LYMPHOCYTES NFR BLD AUTO: 24 % (ref 14–44)
MCH RBC QN AUTO: 31.8 PG (ref 26.8–34.3)
MCHC RBC AUTO-ENTMCNC: 33.6 G/DL (ref 31.4–37.4)
MCV RBC AUTO: 95 FL (ref 82–98)
MONOCYTES # BLD AUTO: 0.51 THOUSAND/ÂΜL (ref 0.17–1.22)
MONOCYTES NFR BLD AUTO: 10 % (ref 4–12)
NEUTROPHILS # BLD AUTO: 2.88 THOUSANDS/ÂΜL (ref 1.85–7.62)
NEUTS SEG NFR BLD AUTO: 55 % (ref 43–75)
NONHDLC SERPL-MCNC: 151 MG/DL
NRBC BLD AUTO-RTO: 0 /100 WBCS
PLATELET # BLD AUTO: 222 THOUSANDS/UL (ref 149–390)
PMV BLD AUTO: 9.8 FL (ref 8.9–12.7)
POTASSIUM SERPL-SCNC: 4.2 MMOL/L (ref 3.5–5.3)
PROT SERPL-MCNC: 7.1 G/DL (ref 6.4–8.4)
RBC # BLD AUTO: 4.62 MILLION/UL (ref 3.88–5.62)
SODIUM SERPL-SCNC: 136 MMOL/L (ref 135–147)
TRIGL SERPL-MCNC: 112 MG/DL
WBC # BLD AUTO: 5.31 THOUSAND/UL (ref 4.31–10.16)

## 2024-06-19 PROCEDURE — 85025 COMPLETE CBC W/AUTO DIFF WBC: CPT

## 2024-06-19 PROCEDURE — 80061 LIPID PANEL: CPT

## 2024-06-19 PROCEDURE — 80053 COMPREHEN METABOLIC PANEL: CPT

## 2024-06-19 PROCEDURE — 36415 COLL VENOUS BLD VENIPUNCTURE: CPT

## 2024-06-20 ENCOUNTER — OFFICE VISIT (OUTPATIENT)
Dept: PHYSICAL THERAPY | Facility: CLINIC | Age: 79
End: 2024-06-20
Payer: MEDICARE

## 2024-06-20 DIAGNOSIS — M17.12 PRIMARY OSTEOARTHRITIS OF LEFT KNEE: Primary | ICD-10-CM

## 2024-06-20 DIAGNOSIS — Z96.652 TOTAL KNEE REPLACEMENT STATUS, LEFT: ICD-10-CM

## 2024-06-20 PROCEDURE — 97110 THERAPEUTIC EXERCISES: CPT | Performed by: PHYSICAL THERAPIST

## 2024-06-20 PROCEDURE — 97116 GAIT TRAINING THERAPY: CPT | Performed by: PHYSICAL THERAPIST

## 2024-06-20 NOTE — PROGRESS NOTES
"Daily Note     Today's date: 2024  Patient name: Reji Henry  : 1945  MRN: [id number]  Referring provider: Poli Dawson*  Dx:   Encounter Diagnosis     ICD-10-CM    1. Primary osteoarthritis of left knee  M17.12       2. Total knee replacement status, left  Z96.652                      Subjective: Pt reports no pain or functional limitations.     Objective: See treatment diary below  PROM 0-123 deg    Assessment: Pt demonstrated normalized PROM for the first time.    Plan: Prepare for d/c in 2 visits as long as PROM is maintained     Precautions: PMHx: HTN, prostate CA  Dx: s/p L TKA 24    Manuals 6/3 6/6 6/10 6/13 6/17 6/20       L knee PROM 10 min 10 min 10 min 10 min 10 min 10 min                                              Neuro Re-Ed                                                                                                        Ther Ex             Bike L1  5 min L1  5 min L1 5 min L1 5 min L1 5 min L1  5 min       Quad sets 5\"x20 5\"x20 5\"x20 5\"x20 5\"x20 5\"x10       Supine GA stretch 20\"x4 20\"x4 20\"x4 20\"x4 20\"x4 20\"x4       Supine HA stretch 20\"x4 20\"x4 20\"x4 20\"x4 20\"x4 20\"x4       Heel slides 5\"x20 5\"x20 5\"x20 5\"x20 5\"x20 5\"x20       SLR 3x10 3x10 3x12 3x12 3x12 3x15       SAQ 5#  3x10            LAQ 5#  3x10 6#  3x10 6#  3x10 7#  3x10 7#  3x10 9#  3x10                                 Ther Activity             Ecc STS  3x10 3x10 3x10 3x10 2# ea  3x10                    Gait Training             Gait training outside No AD  8 min D/c           Step-ups 8\"/  2x10 8\"/  2x10 8\"/  2x10 8\"/ 2x10 8\"/ 2x10 8\"/ 2x10       Step-downs 8\"/  2x10 8\"/  2x10 8\"/  2x10 8\"/ 2x10 8\"/ 2x10 8\"/ 2x10       Step-ups with march 8\"/  1x15 8\"/  1x15 8\"/  1x15 8\"/ 1x15 8\"/ 2x10 8\"/ 2x10                    Modalities                                          "

## 2024-06-24 ENCOUNTER — OFFICE VISIT (OUTPATIENT)
Dept: PHYSICAL THERAPY | Facility: CLINIC | Age: 79
End: 2024-06-24
Payer: MEDICARE

## 2024-06-24 ENCOUNTER — OFFICE VISIT (OUTPATIENT)
Age: 79
End: 2024-06-24
Payer: MEDICARE

## 2024-06-24 VITALS
HEART RATE: 97 BPM | SYSTOLIC BLOOD PRESSURE: 126 MMHG | WEIGHT: 186 LBS | BODY MASS INDEX: 26.63 KG/M2 | TEMPERATURE: 98.2 F | DIASTOLIC BLOOD PRESSURE: 80 MMHG | OXYGEN SATURATION: 97 % | HEIGHT: 70 IN

## 2024-06-24 DIAGNOSIS — E78.00 HYPERCHOLESTEROLEMIA: Primary | ICD-10-CM

## 2024-06-24 DIAGNOSIS — Z96.652 STATUS POST TOTAL KNEE REPLACEMENT USING CEMENT, LEFT: ICD-10-CM

## 2024-06-24 DIAGNOSIS — Z96.652 TOTAL KNEE REPLACEMENT STATUS, LEFT: ICD-10-CM

## 2024-06-24 DIAGNOSIS — I10 BENIGN ESSENTIAL HTN: ICD-10-CM

## 2024-06-24 DIAGNOSIS — M17.12 PRIMARY OSTEOARTHRITIS OF LEFT KNEE: Primary | ICD-10-CM

## 2024-06-24 PROCEDURE — G2211 COMPLEX E/M VISIT ADD ON: HCPCS | Performed by: INTERNAL MEDICINE

## 2024-06-24 PROCEDURE — 99213 OFFICE O/P EST LOW 20 MIN: CPT | Performed by: INTERNAL MEDICINE

## 2024-06-24 PROCEDURE — 97140 MANUAL THERAPY 1/> REGIONS: CPT | Performed by: PHYSICAL THERAPIST

## 2024-06-24 PROCEDURE — 97116 GAIT TRAINING THERAPY: CPT | Performed by: PHYSICAL THERAPIST

## 2024-06-24 PROCEDURE — 97110 THERAPEUTIC EXERCISES: CPT | Performed by: PHYSICAL THERAPIST

## 2024-06-24 RX ORDER — EZETIMIBE 10 MG/1
10 TABLET ORAL DAILY
Qty: 90 TABLET | Refills: 1 | Status: SHIPPED | OUTPATIENT
Start: 2024-06-24 | End: 2024-12-21

## 2024-06-24 NOTE — PROGRESS NOTES
Ear cerumen removal    Date/Time: 2024 11:45 AM    Performed by: Jose Craig MD  Authorized by: Jose Craig MD  [place] Protocol:  Consent: Verbal consent obtained. Written consent not obtained.  Consent given by: patient  Patient understanding: patient states understanding of the procedure being performed  Patient consent: the patient's understanding of the procedure matches consent given  Patient identity confirmed: verbally with patient    Patient location:  Clinic  Procedure details:     Local anesthetic:  None    Procedure type: curette      Approach:  Natural orifice  Post-procedure details:     Complication:  None    Hearing quality:  Normal    Patient tolerance of procedure:  Tolerated well, no immediate complications  Comments:      Bilateral cerumen impactions were relieved with irrigation and removal of impacted cerumen via curette.  Tympanic membranes and ear canals appear normal bilaterally following clearing of cerumen impaction.  Patient tolerated procedure well.    Ambulatory Visit  Name: Reji Henry      : 1945      MRN: [id number]  Encounter Provider: Jose Craig MD  Encounter Date: 2024   Encounter department: [facility] [place]    Assessment & Plan   1. Hypercholesterolemia  -     ezetimibe (ZETIA) 10 mg tablet; Take 1 tablet (10 mg total) by mouth daily  -     Comprehensive metabolic panel; Future; Expected date: 2024  -     Lipid panel; Future; Expected date: 2024  2. Benign essential HTN  -     CBC and differential; Future; Expected date: 2024  -     Comprehensive metabolic panel; Future; Expected date: 2024  3. Status post total knee replacement using cement, left       History of Present Illness     Patient presents for a 6-month follow-up visit.  He is 2 months post left knee replacement and doing very well.  He is very pleased with the results of his knee surgery.  He has  "absolutely no pain and he is feeling quite well.  Labs are reviewed.  Lipid profile is essentially at goal.  LDL cholesterol is 129 patient is on ezetimibe 10 mg daily.  No known history of coronary artery disease.  He was within normal limits with a very slight increase in eosinophils and his white count differential.  PSA was.  He is scheduled for a follow-up MRI and possibly some additional biopsies of his prostate.  He was recently given a prescription for alfuzosin which he has not begun taking yet.        Review of Systems   Constitutional: Negative.    HENT: Negative.     Eyes: Negative.    Respiratory: Negative.     Cardiovascular: Negative.    Gastrointestinal: Negative.    Endocrine: Negative.    Genitourinary:  Positive for difficulty urinating.   Musculoskeletal: Negative.    Skin: Negative.    Allergic/Immunologic: Negative.    Neurological: Negative.    Hematological: Negative.    Psychiatric/Behavioral: Negative.         Objective     /80 (BP Location: Left arm, Patient Position: Sitting, Cuff Size: Standard)   Pulse 97   Temp 98.2 °F (36.8 °C) (Temporal)   Ht 5' 10\" (1.778 m)   Wt 84.4 kg (186 lb)   SpO2 97%   BMI 26.69 kg/m²     Physical Exam  Vitals reviewed.   Constitutional:       General: He is not in acute distress.     Appearance: Normal appearance. He is normal weight. He is not ill-appearing, toxic-appearing or diaphoretic.   HENT:      Head: Normocephalic and atraumatic.      Right Ear: Tympanic membrane, ear canal and external ear normal. There is impacted cerumen.      Left Ear: Tympanic membrane, ear canal and external ear normal. There is impacted cerumen.      Ears:      Comments: TM and canals appear normal after removal of impacted cerumen     Nose: Nose normal.      Mouth/Throat:      Mouth: Mucous membranes are moist.   Eyes:      General: No scleral icterus.     Conjunctiva/sclera: Conjunctivae normal.      Pupils: Pupils are equal, round, and reactive to light. "   Cardiovascular:      Rate and Rhythm: Normal rate and regular rhythm.      Heart sounds: Normal heart sounds.   Pulmonary:      Effort: Pulmonary effort is normal. No respiratory distress.   Abdominal:      General: Abdomen is flat. There is no distension.   Musculoskeletal:         General: Swelling (Minimal postoperative swelling of the left knee.) present.      Cervical back: Neck supple.      Right lower leg: No edema.      Left lower leg: No edema.   Lymphadenopathy:      Cervical: No cervical adenopathy.   Skin:     General: Skin is warm.      Coloration: Skin is not jaundiced.      Findings: No bruising, erythema or rash.   Neurological:      General: No focal deficit present.      Mental Status: He is alert and oriented to person, place, and time. Mental status is at baseline.   Psychiatric:         Mood and Affect: Mood normal.         Behavior: Behavior normal.         Thought Content: Thought content normal.         Judgment: Judgment normal.       Administrative Statements

## 2024-06-24 NOTE — PROGRESS NOTES
"Daily Note     Today's date: 2024  Patient name: Reji Henry  : 1945  MRN: [id number]  Referring provider: Poli Dawson*  Dx:   Encounter Diagnosis     ICD-10-CM    1. Primary osteoarthritis of left knee  M17.12       2. Total knee replacement status, left  Z96.652                      Subjective: Pt reports no pain or functional limitations.     Objective: See treatment diary below  PROM 0-123 deg    Assessment: Pt demonstrated maintenance of normalized PROM.    Plan: D/C to [facility].     Precautions: PMHx: HTN, prostate CA  Dx: s/p L TKA 24    Manuals 6/3 6/6 6/10 6/13 6/17 6/20 6/24      L knee PROM 10 min 10 min 10 min 10 min 10 min 10 min 10 min                                             Neuro Re-Ed                                                                                                        Ther Ex             Bike L1  5 min L1  5 min L1 5 min L1 5 min L1 5 min L1  5 min L1  5 min      Quad sets 5\"x20 5\"x20 5\"x20 5\"x20 5\"x20 5\"x10 5\"x20      Supine GA stretch 20\"x4 20\"x4 20\"x4 20\"x4 20\"x4 20\"x4 20\"x4      Supine HA stretch 20\"x4 20\"x4 20\"x4 20\"x4 20\"x4 20\"x4 20\"x4      Heel slides 5\"x20 5\"x20 5\"x20 5\"x20 5\"x20 5\"x20 5\"x20      SLR 3x10 3x10 3x12 3x12 3x12 3x15 3x15      SAQ 5#  3x10            LAQ 5#  3x10 6#  3x10 6#  3x10 7#  3x10 7#  3x10 9#  3x10 10#  3x10                                Ther Activity             Ecc STS  3x10 3x10 3x10 3x10 2# ea  3x10 4# ea  3x10                   Gait Training             Gait training outside No AD  8 min D/c           Step-ups 8\"/  2x10 8\"/  2x10 8\"/  2x10 8\"/ 2x10 8\"/ 2x10 8\"/ 2x10 8\"/  1x20      Step-downs 8\"/  2x10 8\"/  2x10 8\"/  2x10 8\"/ 2x10 8\"/ 2x10 8\"/ 2x10 8\"/  1x20      Step-ups with \"/  1x15 8\"/  1x15 8\"/  1x15 8\"/ 1x15 8\"/ 2x10 8\"/ 2x10 8\"/  1x20                   Modalities                                          "

## 2024-06-24 NOTE — ASSESSMENT & PLAN NOTE
Lipids are near goal levels.  LDL cholesterol is 129.  This is acceptable.  Continue Zetia 10 mg daily

## 2024-06-25 ENCOUNTER — HOSPITAL ENCOUNTER (OUTPATIENT)
Facility: MEDICAL CENTER | Age: 79
Discharge: HOME | End: 2024-06-25
Payer: MEDICARE

## 2024-06-25 DIAGNOSIS — C61 PROSTATE CANCER (HCC): ICD-10-CM

## 2024-06-25 PROCEDURE — A9585 GADOBUTROL INJECTION: HCPCS

## 2024-06-25 PROCEDURE — 72197 MRI PELVIS W/O & W/DYE: CPT

## 2024-06-25 PROCEDURE — 76377 3D RENDER W/INTRP POSTPROCES: CPT

## 2024-06-25 RX ORDER — GADOBUTROL 604.72 MG/ML
8 INJECTION INTRAVENOUS
Status: COMPLETED | OUTPATIENT
Start: 2024-06-25 | End: 2024-06-25

## 2024-06-25 RX ADMIN — GADOBUTROL 8 ML: 604.72 INJECTION INTRAVENOUS at 11:40

## 2024-06-27 ENCOUNTER — OFFICE VISIT (OUTPATIENT)
Dept: PHYSICAL THERAPY | Facility: CLINIC | Age: 79
End: 2024-06-27
Payer: MEDICARE

## 2024-06-27 DIAGNOSIS — Z96.652 TOTAL KNEE REPLACEMENT STATUS, LEFT: ICD-10-CM

## 2024-06-27 DIAGNOSIS — M17.12 PRIMARY OSTEOARTHRITIS OF LEFT KNEE: Primary | ICD-10-CM

## 2024-06-27 PROCEDURE — 97110 THERAPEUTIC EXERCISES: CPT | Performed by: PHYSICAL THERAPIST

## 2024-06-27 NOTE — PROGRESS NOTES
"Daily Note     Today's date: 2024  Patient name: Reji Henry  : 1945  MRN: [id number]  Referring provider: Poli Dawson*  Dx:   Encounter Diagnosis     ICD-10-CM    1. Primary osteoarthritis of left knee  M17.12       2. Total knee replacement status, left  Z96.652                      Subjective: Pt reports no pain or functional limitations.     Objective: See treatment diary below  PROM 0-124 deg    Assessment: Pt demonstrated maintenance of normalized PROM. Pt is appropriate for d/c from skilled PT services at this time.    Plan: D/C to [facility].     Precautions: PMHx: HTN, prostate CA  Dx: s/p L TKA 24    Manuals 6/3 6/6 6/10 6/13 6/17 6/20 6/24 6/27     L knee PROM 10 min 10 min 10 min 10 min 10 min 10 min 10 min 10 min                                            Neuro Re-Ed                                                                                                        Ther Ex             Bike L1  5 min L1  5 min L1 5 min L1 5 min L1 5 min L1  5 min L1  5 min L1  5 min     Quad sets 5\"x20 5\"x20 5\"x20 5\"x20 5\"x20 5\"x10 5\"x20 5\"x20     Supine GA stretch 20\"x4 20\"x4 20\"x4 20\"x4 20\"x4 20\"x4 20\"x4 20\"x4     Supine HA stretch 20\"x4 20\"x4 20\"x4 20\"x4 20\"x4 20\"x4 20\"x4 20\"x4     Heel slides 5\"x20 5\"x20 5\"x20 5\"x20 5\"x20 5\"x20 5\"x20 5\"x20     SLR 3x10 3x10 3x12 3x12 3x12 3x15 3x15 3x15     SAQ 5#  3x10            LAQ 5#  3x10 6#  3x10 6#  3x10 7#  3x10 7#  3x10 9#  3x10 10#  3x10                                Ther Activity             Ecc STS  3x10 3x10 3x10 3x10 2# ea  3x10 4# ea  3x10 5#  3x10                  Gait Training             Gait training outside No AD  8 min D/c           Step-ups 8\"/  2x10 8\"/  2x10 8\"/  2x10 8\"/ 2x10 8\"/ 2x10 8\"/ 2x10 8\"/  1x20 8\"/  1x20     Step-downs 8\"/  2x10 8\"/  2x10 8\"/  2x10 8\"/ 2x10 8\"/ 2x10 8\"/ 2x10 8\"/  1x20 8\"/  1x20     Step-ups with march 8\"/  1x15 8\"/  1x15 8\"/  1x15 8\"/ 1x15 8\"/ 2x10 8\"/ 2x10 8\"/  1x20 8\"/  1x20                  Modalities "

## 2024-06-28 ENCOUNTER — TELEPHONE (OUTPATIENT)
Dept: UROLOGY | Facility: MEDICAL CENTER | Age: 79
End: 2024-06-28

## 2024-06-28 DIAGNOSIS — C61 PROSTATE CANCER (HCC): Primary | ICD-10-CM

## 2024-06-28 NOTE — TELEPHONE ENCOUNTER
I personally called the patient with the results of his recent MRI of the prostate from 6/25/2024.  We reviewed that his recent MRI graded him as PI-RADS category 4 and that the previously noted lesion from prior MRI studies has remained stable and unchanged in size.  Additionally, we reviewed that the previously noted lesion has been biopsied in 2023 and did not show malignancy at that time.  We discussed that with his prior history of Zaheer 3+3 equal 6 prostate cancer as well as 1 core showing Zaheer 3+4 equal 7, that with his stable MRI as well as his previously PI-RADS 5 not being graded as a PI-RADS 4, that we can repeat his PSA in 6 months prior to his office visit at that time.  The patient verbalized his understanding and was in agreement with this plan.  PSA order placed and put into the patient's chart and he will have this done prior to his upcoming appointment in December.

## 2024-07-10 ENCOUNTER — OFFICE VISIT (OUTPATIENT)
Dept: OBGYN CLINIC | Facility: CLINIC | Age: 79
End: 2024-07-10

## 2024-07-10 ENCOUNTER — HOSPITAL ENCOUNTER (OUTPATIENT)
Dept: RADIOLOGY | Facility: HOSPITAL | Age: 79
Discharge: HOME | End: 2024-07-10
Attending: ORTHOPAEDIC SURGERY
Payer: MEDICARE

## 2024-07-10 VITALS
WEIGHT: 188 LBS | DIASTOLIC BLOOD PRESSURE: 82 MMHG | SYSTOLIC BLOOD PRESSURE: 160 MMHG | HEIGHT: 70 IN | HEART RATE: 100 BPM | BODY MASS INDEX: 26.92 KG/M2

## 2024-07-10 DIAGNOSIS — Z96.652 S/P TKR (TOTAL KNEE REPLACEMENT) USING CEMENT, LEFT: Primary | ICD-10-CM

## 2024-07-10 DIAGNOSIS — Z96.652 S/P TKR (TOTAL KNEE REPLACEMENT) USING CEMENT, LEFT: ICD-10-CM

## 2024-07-10 PROCEDURE — 99024 POSTOP FOLLOW-UP VISIT: CPT | Performed by: ORTHOPAEDIC SURGERY

## 2024-07-10 PROCEDURE — 73562 X-RAY EXAM OF KNEE 3: CPT

## 2024-07-10 RX ORDER — AMOXICILLIN 500 MG/1
CAPSULE ORAL
Qty: 4 CAPSULE | Refills: 4 | Status: SHIPPED | OUTPATIENT
Start: 2024-07-10 | End: 2024-07-11

## 2024-07-10 NOTE — PROGRESS NOTES
78 y.o.male presents for 3 months postoperative visit status post left TKA. Patient denies any chest pain, shortness or breath or calf pain .  Pain is well controlled with medication.  Patient states doing very well he is back to most all activities regarding his left knee.  He denies any issues very happy regarding outcome of his left total knee arthroplasty states he is back to all activities without any issues or limitations.    Review of Systems  Review of systems negative unless otherwise specified in HPI    Past Medical History  Past Medical History:   Diagnosis Date    Allergic [place] - years ago    Arthritis 2018    Knee    Basal cell carcinoma of nose     removed 07/2015    BPH with elevated PSA 12/11/2018    Cancer (HCC)     prostate    Hiatal hernia     sliding -- states no GERD symptoms, takes no meds    Hypercholesterolemia     Hyperlipidemia     Mitral valve disorder     Osteoarthritis     Primary osteoarthritis of left knee 12/11/2018    Seasonal allergies     resolved 01/21/2015       Past Surgical History  Past Surgical History:   Procedure Laterality Date    COLONOSCOPY      DENTAL SURGERY      1970's    EGD      KNEE SURGERY      OTHER SURGICAL HISTORY      cerumen removal 1st 05/26/2011;   2nd:  08/02/2012    NE ARTHRP KNE CONDYLE&PLATU MEDIAL&LAT COMPARTMENTS Left 4/22/2024    Procedure: ARTHROPLASTY KNEE TOTAL and all associated procedures;  Surgeon: Arabella Bernard MD;  Location: [facility];  Service: Orthopedics    NE BX PROSTATE STRTCTC SATURATION SAMPLING IMG GID N/A 06/08/2022    Procedure: TRANSPERINEAL MRI FUSION BIOPSY PROSTATE;  Surgeon: Vicente Kee MD;  Location[facility];  Service: Urology    NE PROSTATE NEEDLE BIOPSY ANY APPROACH N/A 04/06/2021    Procedure: TRANSPERINEAL MRI FUSION PROSTATE BIOPSY;  Surgeon: Castro Ortega MD;  Location: [facility];  Service: Urology    PROSTATE BIOPSY         Current Medications  Current Outpatient Medications on File Prior to  Visit   Medication Sig Dispense Refill    alfuzosin (UROXATRAL) 10 mg 24 hr tablet TAKE 1 TABLET(10 MG TOTAL) BY MOUTH AT DINNER BEFORE BEDTIME 90 tablet 3    ascorbic acid (VITAMIN C) 500 MG tablet Take 1 tablet (500 mg total) by mouth 2 (two) times a day 60 tablet 1    aspirin (ECOTRIN LOW STRENGTH) 81 mg EC tablet Take 1 tablet (81 mg total) by mouth 2 (two) times a day Take 1(one) 81 mg tablet twice daily x 35 days after total joint arthroplasty (Patient taking differently: Take 81 mg by mouth daily) 70 tablet 0    co-enzyme Q-10 100 mg capsule Take 100 mg by mouth daily      ezetimibe (ZETIA) 10 mg tablet Take 1 tablet (10 mg total) by mouth daily 90 tablet 1    ketoconazole (NIZORAL) 2 % shampoo if needed      Multiple Vitamins-Minerals (MULTIVITAMIN ADULT) TABS Take 1 tablet by mouth daily      Omega-3 Fatty Acids (FISH OIL) 1200 MG CAPS Take 2 capsules by mouth daily       Vitamin D, Cholecalciferol, 25 MCG (1000 UT) TABS in the morning      docusate sodium (COLACE) 100 mg capsule Take 1 capsule (100 mg total) by mouth 2 (two) times a day 10 capsule 0     No current facility-administered medications on file prior to visit.       Recent Labs (HCT,HGB,PT,INR,ESR,CRP,GLU,HgA1C)  0   Lab Value Date/Time    HCT 43.8 06/19/2024 1038    HGB 14.7 06/19/2024 1038    WBC 5.31 06/19/2024 1038    INR 1.02 03/28/2024 1025    CRP 5.6 11/05/2020 0945    HGBA1C 5.2 03/28/2024 1025           Body mass index is 26.98 kg/m².  Wt Readings from Last 3 Encounters:   07/10/24 85.3 kg (188 lb)   06/24/24 84.4 kg (186 lb)   06/10/24 85.3 kg (188 lb)       Physical exam   General: Awake, Alert, Oriented   Eyes: Pupils equal, round and reactive to light    Heart: regular rate and rhythm   Lungs: No audible wheezing   Abdomen: soft  left Lower extremity      Incision well approximated without erythema no tenderness to palpation    Full knee extension 120 of flexion   Active ankle dorsal and plantarflexion without pain, calf nontender  palpation   sensation mildly decreased nancy-incisionally otherwise intact   Distal pulses present      Imaging  X rays of the left knee reviewed.  X rays reveal excellently aligned left total knee arthroplasty without evidence of loosening or osseous abnormality.    Assessment:  Status post 12 weeks left TKA    Plan:  Weight bearing as tolerated left Lower extremity  Physical therapy  Lifetime Dental antibiotic prophylaxis recommended  Pain medication as needed  Follow-up in 9 months and repeat x-rays left knee

## 2024-07-10 NOTE — PATIENT INSTRUCTIONS
[facility]                                                                                               Dr. Arabella Brenard                                                                                                            ANTIBIOTIC USE FOR JOINT REPLACEMENT PATIENTS  You have had surgery to replace one of your joints with a metal prosthesis. Going forward, you should take oral antibiotics before any dental work, including routine cleanings. These procedures are a potential source of injection. If you develop an infection, it could spread to your operative joint and cause complications.  Please show the following guidelines to your medical doctor and dentist, so he/she can prescribe the appropriate medications.  The following information is recommended by the American Heart, Dental, and Orthopedic Associations:  STANDARD GENERAL PROPHYLAXIS  antibiotic prophylaxis recommended lifetime  Recommend refraining from dental procedures until 3 months post operatively  Amoxicillin for Adults:    500mg - Take 4 capsules (2 grams) orally one hour before the procedure  If allergic to Penicillin  Clindamycin for Adults:   300mg - Take 2 capsules (600 mg) orally one hour before the procedure  Azithromycin for Adults:  250mg - Take 2 capsules (500 mg) orally one hour before the procedure  Cephalexin for Adults:     500mg - Take 4 capsules (2 grams) orally one hour before the procedure  Note: Cephalosporins should not be used if you have ever developed an immediate hypersensitivity reaction (i.e., hives, swelling, severe itching, difficulty breathing) to Penicillin  Please feel free to contact our office at [phone] with questions or concerns.

## 2024-07-11 ENCOUNTER — TELEPHONE (OUTPATIENT)
Age: 79
End: 2024-07-11

## 2024-07-11 NOTE — TELEPHONE ENCOUNTER
Caller: Patient    Doctor: Marco Antonio    Reason for call: Patient is calling to see if there is any restrictions of putting any kind of lotions or creams on his incision. Also ,he would like to know if he can submerge it in water.     Call back#: [phone]

## 2024-10-11 NOTE — PATIENT INSTRUCTIONS
[employer] [facility] is the prothesis that will be used.
my right knee

## 2024-12-02 ENCOUNTER — APPOINTMENT (OUTPATIENT)
Dept: LAB | Facility: CLINIC | Age: 79
End: 2024-12-02
Payer: MEDICARE

## 2024-12-02 DIAGNOSIS — C61 PROSTATE CANCER (HCC): ICD-10-CM

## 2024-12-02 LAB — PSA SERPL-MCNC: 6.51 NG/ML (ref 0–4)

## 2024-12-02 PROCEDURE — 36415 COLL VENOUS BLD VENIPUNCTURE: CPT

## 2024-12-02 PROCEDURE — 84153 ASSAY OF PSA TOTAL: CPT

## 2024-12-03 ENCOUNTER — RESULTS FOLLOW-UP (OUTPATIENT)
Dept: UROLOGY | Facility: AMBULATORY SURGERY CENTER | Age: 79
End: 2024-12-03

## 2024-12-03 NOTE — TELEPHONE ENCOUNTER
Called and left a message for this pt relaying Garett's message. I also left the main line number if this pt has any questions at a later date. If this pt calls back please relay these messages again. Thank you                ----- Message from Garett Hernandez PA-C sent at 12/3/2024  8:00 AM EST -----  I reviewed the patient's most recent PSA and it returned at a value of 6.514.  This PSA was returned near his baseline, and we do not need to repeat his PSA at this time.  We can discuss in more detail at his follow-up visit on 12/10/2024.

## 2024-12-10 ENCOUNTER — OFFICE VISIT (OUTPATIENT)
Dept: UROLOGY | Facility: AMBULATORY SURGERY CENTER | Age: 79
End: 2024-12-10

## 2024-12-10 VITALS
OXYGEN SATURATION: 98 % | HEART RATE: 85 BPM | BODY MASS INDEX: 27.69 KG/M2 | WEIGHT: 193.4 LBS | HEIGHT: 70 IN | SYSTOLIC BLOOD PRESSURE: 144 MMHG | DIASTOLIC BLOOD PRESSURE: 76 MMHG

## 2024-12-10 DIAGNOSIS — C61 PROSTATE CANCER (HCC): ICD-10-CM

## 2024-12-10 DIAGNOSIS — N13.8 BPH WITH OBSTRUCTION/LOWER URINARY TRACT SYMPTOMS: Primary | ICD-10-CM

## 2024-12-10 DIAGNOSIS — N40.1 BPH WITH OBSTRUCTION/LOWER URINARY TRACT SYMPTOMS: Primary | ICD-10-CM

## 2024-12-10 RX ORDER — SILODOSIN 4 MG/1
4 CAPSULE ORAL DAILY
Qty: 30 CAPSULE | Refills: 3 | Status: SHIPPED | OUTPATIENT
Start: 2024-12-10

## 2024-12-10 NOTE — PROGRESS NOTES
12/10/2024      Assessment and Plan    78 y.o. male managed by Dr. Recinos    Prostate cancer ([facility])  Patient has a history of [place] 3+3 equal 6 prostate cancer as well as 1 core showing [place] 3+4 equal 7 prostate cancer from prostate biopsy performed in 2021.  From prostate biopsy performed in 2021.  Patient has a significant history of elevated PSA with multiple biopsies.  Transrectal ultrasound-guided biopsy in 2019 was negative for malignancy but did demonstrate atypical cells  MRI fusion prostate biopsy performed in 2021 revealed 3 cores of [place] 3+3 equal 6 as well as 1 core of [place] 3+4 equal 7 prostate cancer  Repeat MRI fusion prostate biopsy in June 2022 resulted in all cores negative for malignancy but 1 core revealing high-grade prostatic intraepithelial neoplasia  Prolaris genomic testing from the patient's 2021 biopsy sample revealed a 2% risk of 10-year disease specific mortality as well as a less than 1% chance of metastatic disease with primary treatment at 10 years.   Patient underwent multiparametric MRI of the prostate on 6/25/2024 and was graded as PI-RADS category 4 and the previously noted lesion was unchanged when compared to prior MRIs.  Patient's prostate volume was 104 mL.  Patient's most recent PSA was performed 12/2/2024 and returned at a value of 6.514.  We discussed that with his reassuring MRI of his stable lesion as well as his PSA dropping back down towards his baseline, that we can defer undergoing a biopsy at this time.  However, we discussed that the patient would be due for a surveillance biopsy next year and then we should proceed with undergoing one at that time.    Lab Results   Component Value Date    PSA 6.514 (H) 12/02/2024    PSA 7.67 (H) 06/05/2024    PSA 7.75 (H) 03/28/2024        BPH with obstruction/lower urinary tract symptoms  Patient has known prostatomegaly with his recent MRI of the prostate from 6/25/2024 revealing a prostate of 104 g  Patient initiated  therapy with alfuzosin 10 mg once daily, but discontinue it after experiencing side effects of constipation  We discussed further pharmacotherapy with trialing a separate alpha-blocker such as silodosin versus a 5 alpha reductase inhibitor such as finasteride.  The patient will trial silodosin 4 mg once daily for treatment of his lower urinary tract symptoms.  We discussed that since this medication is also an alpha-blocker he does run the risk of having similar side effects.  Advised the patient to notify the office if he experiences any further side effects.  Patient will return to the office in 3 months to reevaluate lower urinary tract symptoms        History of Present Illness  Reji Henry is a 78 y.o. male here for evaluation of history of [place] 3+3 = 6 prostate cancer as well as 1 core showing [place] 3+4 equal 7 prostate cancer in 2021.  Patient has a significant history of elevated PSA which prompted a transrectal ultrasound-guided biopsy in 2019 which was negative for malignancy but which did demonstrate atypical cells which prompted a multi parametric MRI when his PSA chantal to 7.  That first multi parametric MRI in 1/2021 revealed a PI-RADS 5 lesion in the anterior peripheral zone of the prostate apex.  These findings prompted MRI fusion biopsy performed in 2021 by Dr. Ortega which revealed 3 cores of [place] 3+3 disease as well as 1 core of [place] 3+4 disease.  At that time the patient opted to proceed with active surveillance versus definitive treatment for his prostate cancer.  Genomic testing at that time revealed a 2% risk of 10-year disease specific mortality as well as a less than 1% chance of metastatic disease with primary treatment of 10 years.  Repeat MRI of the prostate performed in April 2022 revealed no significant interval change in the previously seen lesion at the prostatic apex in the anterior peripheral zone extending into the anterior transition zone.  Repeat MRI fusion prostate  biopsy in June 2022 with all cores negative for malignancy but 1 core revealing high-grade prostatic intraepithelial neoplasia.      PSA in October 2023 was 6.48 which was elevated from his previous PSA of 5.7 in April 2023.  Patient underwent multiparametric MRI of the prostate in April 2023 which demonstrated stability of radiographic features of 1.6 cm lesion in the anterior peripheral zone.      Since his last visit, the patient underwent repeat PSA testing in March 2024 and his PSA was found to be significantly elevated at 7.75 from his previous 6.48 in October 2023.  Patient was then advised to undergo repeat PSA testing which he had done on June 5, 2024 which was found to be 7.67.  Patient underwent multiparametric MRI of the prostate on 6/25/2024 and the patient was graded as PI-RADS category 4 and was noted that the previous lesion was unchanged when compared to prior MRIs.  Patient's prostate volume was sized at 104 mL.  There is discussed with the patient at that time that options were to proceed with undergoing a surveillance prostate biopsy versus continued monitoring his PSA at a shorter interval.  The patient would prefer to repeat his PSA in 6 months prior to proceeding with prostate biopsy.  The patient underwent PSA testing on 12/2/2024 and returned at a value of 6.514, which returned near his baseline.    Additionally, the patient reported a weakened urinary stream, difficulty initiating his urinary stream, and nocturia x 2-3 at his last office visit and the patient was initiated on alfuzosin at that time.  Today, the patient reports that the alfuzosin did significantly improve his urinary stream as well as decrease his nocturia.  However, the patient did experience side effects of constipation.  Patient stated that after discontinuing the medication the constipation resolved, and then trialed medication again but the constipation returned.        Review of Systems   Constitutional:  Negative for  "chills and fever.   HENT:  Negative for ear pain and sore throat.    Eyes:  Negative for pain and visual disturbance.   Respiratory:  Negative for cough and shortness of breath.    Cardiovascular:  Negative for chest pain and palpitations.   Gastrointestinal:  Negative for abdominal pain and vomiting.   Genitourinary:  Positive for difficulty urinating and frequency. Negative for decreased urine volume, dysuria, flank pain, hematuria and urgency.   Musculoskeletal:  Negative for arthralgias and back pain.   Skin:  Negative for color change and rash.   Neurological:  Negative for seizures and syncope.   All other systems reviewed and are negative.          AUA SYMPTOM SCORE      Flowsheet Row Most Recent Value   AUA SYMPTOM SCORE    How often have you had a sensation of not emptying your bladder completely after you finished urinating? 1 (P)     How often have you had to urinate again less than two hours after you finished urinating? 3 (P)     How often have you found you stopped and started again several times when you urinate? 1 (P)     How often have you found it difficult to postpone urination? 1 (P)     How often have you had a weak urinary stream? 4 (P)     How often have you had to push or strain to begin urination? 0 (P)     How many times did you most typically get up to urinate from the time you went to bed at night until the time you got up in the morning? 5 (P)     Quality of Life: If you were to spend the rest of your life with your urinary condition just the way it is now, how would you feel about that? 3 (P)     AUA SYMPTOM SCORE 15 (P)               Vitals  Vitals:    12/10/24 1315   BP: 144/76   BP Location: Left arm   Patient Position: Sitting   Cuff Size: Large   Pulse: 85   SpO2: 98%   Weight: 87.7 kg (193 lb 6.4 oz)   Height: 5' 10\" (1.778 m)       Physical Exam  Vitals reviewed.   Constitutional:       General: He is not in acute distress.     Appearance: Normal appearance. He is not " ill-appearing.   HENT:      Head: Normocephalic and atraumatic.      Nose: Nose normal.   Eyes:      General: No scleral icterus.  Pulmonary:      Effort: No respiratory distress.   Abdominal:      General: Abdomen is flat. There is no distension.      Palpations: Abdomen is soft.      Tenderness: There is no abdominal tenderness.   Musculoskeletal:         General: Normal range of motion.      Cervical back: Normal range of motion.   Skin:     General: Skin is warm.      Coloration: Skin is not jaundiced.   Neurological:      Mental Status: He is alert and oriented to person, place, and time.      Gait: Gait normal.   Psychiatric:         Mood and Affect: Mood normal.         Behavior: Behavior normal.           Past History  Past Medical History:   Diagnosis Date    Allergic Spring pollens - years ago    Arthritis 2018    Knee    Basal cell carcinoma of nose     removed 07/2015    BPH with elevated PSA 12/11/2018    Cancer (HCC)     prostate    Hiatal hernia     sliding -- states no GERD symptoms, takes no meds    Hypercholesterolemia     Hyperlipidemia     Mitral valve disorder     Osteoarthritis     Primary osteoarthritis of left knee 12/11/2018    Seasonal allergies     resolved 01/21/2015     Social History     Socioeconomic History    Marital status: /Civil Union     Spouse name: None    Number of children: None    Years of education: None    Highest education level: None   Occupational History    Occupation:    Tobacco Use    Smoking status: Never    Smokeless tobacco: Never   Vaping Use    Vaping status: Never Used   Substance and Sexual Activity    Alcohol use: Yes     Comment: Less than one drink per week    Drug use: No    Sexual activity: None   Other Topics Concern    None   Social History Narrative    Advance direct of file     Caffeine use -  He admits to consuming caffeine via coffee; 2-3 cups per week; and tea 2 cups per day     Uses safety belts      Social Drivers of Health      Financial Resource Strain: Low Risk  (12/18/2023)    Overall Financial Resource Strain (CARDIA)     Difficulty of Paying Living Expenses: Not hard at all   Food Insecurity: Not on file   Transportation Needs: No Transportation Needs (12/18/2023)    PRAPARE - Transportation     Lack of Transportation (Medical): No     Lack of Transportation (Non-Medical): No   Physical Activity: Not on file   Stress: Not on file   Social Connections: Not on file   Intimate Partner Violence: Not on file   Housing Stability: Not on file     Social History     Tobacco Use   Smoking Status Never   Smokeless Tobacco Never     Family History   Problem Relation Age of Onset    Alcohol abuse Father         independently diagnosed     Heart disease Father     Cancer Father     Hypertension Mother     COPD Mother     Diabetes type II Mother     Hypertension Maternal Grandmother     Coronary artery disease Maternal Grandmother     Diabetes Maternal Grandmother     Lung cancer Maternal Grandmother     Coronary artery disease Family     Stroke Paternal Grandmother     Lung cancer Maternal Grandfather        The following portions of the patient's history were reviewed and updated as appropriate: allergies, current medications, past medical history, past social history, past surgical history and problem list.    Results  No results found for this or any previous visit (from the past hour).]  Lab Results   Component Value Date    PSA 6.514 (H) 12/02/2024    PSA 7.67 (H) 06/05/2024    PSA 7.75 (H) 03/28/2024    PSA 6.48 (H) 10/16/2023     Lab Results   Component Value Date    CALCIUM 9.3 06/19/2024    K 4.2 06/19/2024    CO2 27 06/19/2024     06/19/2024    BUN 14 06/19/2024    CREATININE 0.86 06/19/2024     Lab Results   Component Value Date    WBC 5.31 06/19/2024    HGB 14.7 06/19/2024    HCT 43.8 06/19/2024    MCV 95 06/19/2024     06/19/2024

## 2024-12-10 NOTE — ASSESSMENT & PLAN NOTE
Patient has known prostatomegaly with his recent MRI of the prostate from 6/25/2024 revealing a prostate of 104 g  Patient initiated therapy with alfuzosin 10 mg once daily, but discontinue it after experiencing side effects of constipation  We discussed further pharmacotherapy with trialing a separate alpha-blocker such as silodosin versus a 5 alpha reductase inhibitor such as finasteride.  The patient will trial silodosin 4 mg once daily for treatment of his lower urinary tract symptoms.  We discussed that since this medication is also an alpha-blocker he does run the risk of having similar side effects.  Advised the patient to notify the office if he experiences any further side effects.  Patient will return to the office in 3 months to reevaluate lower urinary tract symptoms

## 2024-12-10 NOTE — ASSESSMENT & PLAN NOTE
Patient has a history of [place] 3+3 equal 6 prostate cancer as well as 1 core showing [place] 3+4 equal 7 prostate cancer from prostate biopsy performed in 2021.  From prostate biopsy performed in 2021.  Patient has a significant history of elevated PSA with multiple biopsies.  Transrectal ultrasound-guided biopsy in 2019 was negative for malignancy but did demonstrate atypical cells  MRI fusion prostate biopsy performed in 2021 revealed 3 cores of [place] 3+3 equal 6 as well as 1 core of [place] 3+4 equal 7 prostate cancer  Repeat MRI fusion prostate biopsy in June 2022 resulted in all cores negative for malignancy but 1 core revealing high-grade prostatic intraepithelial neoplasia  Prolaris genomic testing from the patient's 2021 biopsy sample revealed a 2% risk of 10-year disease specific mortality as well as a less than 1% chance of metastatic disease with primary treatment at 10 years.   Patient underwent multiparametric MRI of the prostate on 6/25/2024 and was graded as PI-RADS category 4 and the previously noted lesion was unchanged when compared to prior MRIs.  Patient's prostate volume was 104 mL.  Patient's most recent PSA was performed 12/2/2024 and returned at a value of 6.514.  We discussed that with his reassuring MRI of his stable lesion as well as his PSA dropping back down towards his baseline, that we can defer undergoing a biopsy at this time.  However, we discussed that the patient would be due for a surveillance biopsy next year and then we should proceed with undergoing one at that time.    Lab Results   Component Value Date    PSA 6.514 (H) 12/02/2024    PSA 7.67 (H) 06/05/2024    PSA 7.75 (H) 03/28/2024

## 2024-12-11 ENCOUNTER — TELEPHONE (OUTPATIENT)
Age: 79
End: 2024-12-11

## 2024-12-11 NOTE — TELEPHONE ENCOUNTER
Called Jony and notifie him that providers states he should discuss this medication with his eye surgeon about plan of care - he understersood - figured that would be the case

## 2024-12-11 NOTE — TELEPHONE ENCOUNTER
Patient seen by Garett Hernandez yesterday and  at office visit Silodosin 4mg was prescibed for BPH.    Patient read about medication and it mentioned  taking this medication and having cataracts there was a interaction. Patient is going to see eye doctor about his cataracts, and does not want a problem, if he decides to have them removed.     Should the patient start this new medication or hold off for now?    Please advise.     #[phone]

## 2024-12-11 NOTE — TELEPHONE ENCOUNTER
There can be an increased risk of floppy iris disc with the medication.  If patient is planning on having cataract surgery I would hold off on starting the medication until he is seen by his eye surgeon and discuss his plan of care.

## 2024-12-30 ENCOUNTER — APPOINTMENT (OUTPATIENT)
Dept: LAB | Facility: CLINIC | Age: 79
End: 2024-12-30
Payer: MEDICARE

## 2024-12-30 ENCOUNTER — RA CDI HCC (OUTPATIENT)
Dept: OTHER | Facility: HOSPITAL | Age: 79
End: 2024-12-30

## 2024-12-30 DIAGNOSIS — I10 BENIGN ESSENTIAL HTN: ICD-10-CM

## 2024-12-30 DIAGNOSIS — E78.00 HYPERCHOLESTEROLEMIA: ICD-10-CM

## 2024-12-30 LAB
ALBUMIN SERPL BCG-MCNC: 4.3 G/DL (ref 3.5–5)
ALP SERPL-CCNC: 66 U/L (ref 34–104)
ALT SERPL W P-5'-P-CCNC: 21 U/L (ref 7–52)
ANION GAP SERPL CALCULATED.3IONS-SCNC: 7 MMOL/L (ref 4–13)
AST SERPL W P-5'-P-CCNC: 21 U/L (ref 13–39)
BASOPHILS # BLD AUTO: 0.06 THOUSANDS/ΜL (ref 0–0.1)
BASOPHILS NFR BLD AUTO: 1 % (ref 0–1)
BILIRUB SERPL-MCNC: 0.74 MG/DL (ref 0.2–1)
BUN SERPL-MCNC: 16 MG/DL (ref 5–25)
CALCIUM SERPL-MCNC: 9.1 MG/DL (ref 8.4–10.2)
CHLORIDE SERPL-SCNC: 102 MMOL/L (ref 96–108)
CHOLEST SERPL-MCNC: 195 MG/DL (ref ?–200)
CO2 SERPL-SCNC: 28 MMOL/L (ref 21–32)
CREAT SERPL-MCNC: 0.98 MG/DL (ref 0.6–1.3)
EOSINOPHIL # BLD AUTO: 0.48 THOUSAND/ΜL (ref 0–0.61)
EOSINOPHIL NFR BLD AUTO: 9 % (ref 0–6)
ERYTHROCYTE [DISTWIDTH] IN BLOOD BY AUTOMATED COUNT: 13.2 % (ref 11.6–15.1)
GFR SERPL CREATININE-BSD FRML MDRD: 73 ML/MIN/1.73SQ M
GLUCOSE P FAST SERPL-MCNC: 95 MG/DL (ref 65–99)
HCT VFR BLD AUTO: 45.7 % (ref 36.5–49.3)
HDLC SERPL-MCNC: 43 MG/DL
HGB BLD-MCNC: 15.9 G/DL (ref 12–17)
IMM GRANULOCYTES # BLD AUTO: 0.01 THOUSAND/UL (ref 0–0.2)
IMM GRANULOCYTES NFR BLD AUTO: 0 % (ref 0–2)
LDLC SERPL CALC-MCNC: 128 MG/DL (ref 0–100)
LYMPHOCYTES # BLD AUTO: 1.36 THOUSANDS/ΜL (ref 0.6–4.47)
LYMPHOCYTES NFR BLD AUTO: 25 % (ref 14–44)
MCH RBC QN AUTO: 32.1 PG (ref 26.8–34.3)
MCHC RBC AUTO-ENTMCNC: 34.8 G/DL (ref 31.4–37.4)
MCV RBC AUTO: 92 FL (ref 82–98)
MONOCYTES # BLD AUTO: 0.54 THOUSAND/ΜL (ref 0.17–1.22)
MONOCYTES NFR BLD AUTO: 10 % (ref 4–12)
NEUTROPHILS # BLD AUTO: 3.11 THOUSANDS/ΜL (ref 1.85–7.62)
NEUTS SEG NFR BLD AUTO: 55 % (ref 43–75)
NONHDLC SERPL-MCNC: 152 MG/DL
NRBC BLD AUTO-RTO: 0 /100 WBCS
PLATELET # BLD AUTO: 180 THOUSANDS/UL (ref 149–390)
PMV BLD AUTO: 9.8 FL (ref 8.9–12.7)
POTASSIUM SERPL-SCNC: 4.1 MMOL/L (ref 3.5–5.3)
PROT SERPL-MCNC: 6.7 G/DL (ref 6.4–8.4)
RBC # BLD AUTO: 4.96 MILLION/UL (ref 3.88–5.62)
SODIUM SERPL-SCNC: 137 MMOL/L (ref 135–147)
TRIGL SERPL-MCNC: 122 MG/DL (ref ?–150)
WBC # BLD AUTO: 5.56 THOUSAND/UL (ref 4.31–10.16)

## 2024-12-30 PROCEDURE — 80053 COMPREHEN METABOLIC PANEL: CPT

## 2024-12-30 PROCEDURE — 36415 COLL VENOUS BLD VENIPUNCTURE: CPT

## 2024-12-30 PROCEDURE — 85025 COMPLETE CBC W/AUTO DIFF WBC: CPT

## 2024-12-30 PROCEDURE — 80061 LIPID PANEL: CPT

## 2025-01-03 ENCOUNTER — OFFICE VISIT (OUTPATIENT)
Age: 80
End: 2025-01-03
Payer: MEDICARE

## 2025-01-03 VITALS
WEIGHT: 191 LBS | HEIGHT: 71 IN | OXYGEN SATURATION: 99 % | DIASTOLIC BLOOD PRESSURE: 72 MMHG | BODY MASS INDEX: 26.74 KG/M2 | HEART RATE: 84 BPM | SYSTOLIC BLOOD PRESSURE: 144 MMHG | TEMPERATURE: 97.7 F

## 2025-01-03 DIAGNOSIS — Z00.00 MEDICARE ANNUAL WELLNESS VISIT, SUBSEQUENT: Primary | ICD-10-CM

## 2025-01-03 DIAGNOSIS — E78.00 HYPERCHOLESTEROLEMIA: ICD-10-CM

## 2025-01-03 DIAGNOSIS — C61 PROSTATE CANCER (HCC): ICD-10-CM

## 2025-01-03 DIAGNOSIS — I10 BENIGN ESSENTIAL HTN: ICD-10-CM

## 2025-01-03 PROCEDURE — 99213 OFFICE O/P EST LOW 20 MIN: CPT | Performed by: INTERNAL MEDICINE

## 2025-01-03 PROCEDURE — G0439 PPPS, SUBSEQ VISIT: HCPCS | Performed by: INTERNAL MEDICINE

## 2025-01-03 RX ORDER — EZETIMIBE 10 MG/1
10 TABLET ORAL DAILY
Qty: 90 TABLET | Refills: 1 | Status: SHIPPED | OUTPATIENT
Start: 2025-01-03 | End: 2025-07-02

## 2025-01-03 NOTE — ASSESSMENT & PLAN NOTE
Blood pressure remains borderline high.  Limit salt intake monitor total sodium intake.    Orders:    CBC and differential; Future    Comprehensive metabolic panel; Future

## 2025-01-03 NOTE — PROGRESS NOTES
Name: Reji Henry      : 1945      MRN: [id number]  Encounter Provider: Jose Craig MD  Encounter Date: 1/3/2025   Encounter department: [facility] [place]    Assessment & Plan  Hypercholesterolemia  At goal and relatively unchanged from previous.  Continue Zetia 10 mg daily.  Orders:    ezetimibe (ZETIA) 10 mg tablet; Take 1 tablet (10 mg total) by mouth daily    CBC and differential; Future    Comprehensive metabolic panel; Future    Lipid panel; Future    Benign essential HTN  Blood pressure remains borderline high.  Limit salt intake monitor total sodium intake.    Orders:    CBC and differential; Future    Comprehensive metabolic panel; Future    Prostate cancer (HCC)  Follows with urology.  At present it is watchful waiting       Medicare annual wellness visit, subsequent  Patient is up-to-date with age-appropriate screenings and immunizations.  Would likely benefit from a Prevnar 20 next year          Preventive health issues were discussed with patient, and age appropriate screening tests were ordered as noted in patient's After Visit Summary. Personalized health advice and appropriate referrals for health education or preventive services given if needed, as noted in patient's After Visit Summary.    History of Present Illness     Patient presents to the office for follow-up visit.  He has no major complaints and is feeling well.  Recent labs were reviewed.  His PSA is slightly lower than previous.  He is being followed with urology.  CBC is essentially normal with a very mild eosinophilia which has been persistent and all of his previous testing.  CMP is completely normal with an EGFR of 73.  Lipid profile relatively unchanged from previous and close to goal levels.  With total cholesterol of 195, triglycerides at 122, HDL cholesterol at 43 and LDL cholesterol of 128       Patient Care Team:  Jose Craig MD as PCP - General    Review of  Systems   Constitutional: Negative.  Negative for activity change, appetite change, chills, diaphoresis, fatigue, fever and unexpected weight change.   HENT: Negative.     Eyes: Negative.    Respiratory: Negative.     Cardiovascular: Negative.    Gastrointestinal: Negative.    Endocrine: Negative.    Genitourinary: Negative.    Musculoskeletal: Negative.    Skin: Negative.    Neurological: Negative.    Hematological: Negative.    Psychiatric/Behavioral:  The patient is not nervous/anxious.      Medical History Reviewed by provider this encounter:       Annual Wellness Visit Questionnaire   Reji is here for his Subsequent Wellness visit.     Health Risk Assessment:   Patient rates overall health as very good. Patient feels that their physical health rating is much better. Patient is satisfied with their life. Eyesight was rated as slightly worse. Hearing was rated as same. Patient feels that their emotional and mental health rating is same. Patients states they are never, rarely angry. Patient states they are sometimes unusually tired/fatigued. Pain experienced in the last 7 days has been none. Patient states that he has experienced no weight loss or gain in last 6 months.     Depression Screening:   PHQ-2 Score: 0      Fall Risk Screening:   In the past year, patient has experienced: no history of falling in past year      Home Safety:  Patient does not have trouble with stairs inside or outside of their home. Patient has working smoke alarms and has working carbon monoxide detector. Home safety hazards include: none.     Nutrition:   Current diet is Regular.     Medications:   Patient is currently taking over-the-counter supplements. OTC medications include: see medication list. Patient is able to manage medications.     Activities of Daily Living (ADLs)/Instrumental Activities of Daily Living (IADLs):   Walk and transfer into and out of bed and chair?: Yes  Dress and groom yourself?: Yes    Bathe or shower  yourself?: Yes    Feed yourself? Yes  Do your laundry/housekeeping?: Yes  Manage your money, pay your bills and track your expenses?: Yes  Make your own meals?: Yes    Do your own shopping?: Yes    Previous Hospitalizations:   Any hospitalizations or ED visits within the last 12 months?: No      Advance Care Planning:   Living will: Yes    Durable POA for healthcare: Yes    Advanced directive: Yes    Advanced directive counseling given: No    Five wishes given: No    Patient declined ACP directive: No    End of Life Decisions reviewed with patient: Yes    Provider agrees with end of life decisions: Yes      Cognitive Screening:   Provider or family/friend/caregiver concerned regarding cognition?: No    PREVENTIVE SCREENINGS      Cardiovascular Screening:    General: Screening Not Indicated, History Lipid Disorder and Screening Current      Diabetes Screening:     General: Screening Current and Screening Not Indicated      Colorectal Cancer Screening:     General: Screening Current      Prostate Cancer Screening:    General: History Prostate Cancer and Screening Not Indicated      Osteoporosis Screening:    General: Screening Not Indicated      Abdominal Aortic Aneurysm (AAA) Screening:        General: Screening Not Indicated      Lung Cancer Screening:     General: Screening Not Indicated      Hepatitis C Screening:    General: Screening Current    Screening, Brief Intervention, and Referral to Treatment (SBIRT)    Screening  Typical number of drinks in a day: 0  Typical number of drinks in a week: 1  Interpretation: Low risk drinking behavior.    AUDIT-C Screenin) How often did you have a drink containing alcohol in the past year? 2 to 4 times a month  2) How many drinks did you have on a typical day when you were drinking in the past year? 1 to 2  3) How often did you have 6 or more drinks on one occasion in the past year? never    AUDIT-C Score: 2  Interpretation: Score 0-3 (male): Negative screen for  "alcohol misuse    Single Item Drug Screening:  How often have you used an illegal drug (including marijuana) or a prescription medication for non-medical reasons in the past year? never    Single Item Drug Screen Score: 0  Interpretation: Negative screen for possible drug use disorder    Social Drivers of Health     Financial Resource Strain: Low Risk  (12/18/2023)    Overall Financial Resource Strain (CARDIA)     Difficulty of Paying Living Expenses: Not hard at all   Food Insecurity: No Food Insecurity (1/3/2025)    Hunger Vital Sign     Worried About Running Out of Food in the Last Year: Never true     Ran Out of Food in the Last Year: Never true   Transportation Needs: No Transportation Needs (1/3/2025)    PRAPARE - Transportation     Lack of Transportation (Medical): No     Lack of Transportation (Non-Medical): No   Housing Stability: Low Risk  (1/3/2025)    Housing Stability Vital Sign     Unable to Pay for Housing in the Last Year: No     Number of Times Moved in the Last Year: 0     Homeless in the Last Year: No   Utilities: Not At Risk (1/3/2025)    [facility] Utilities     Threatened with loss of utilities: No     No results found.    Objective   /72 (BP Location: Left arm, Patient Position: Sitting, Cuff Size: Standard)   Pulse 84   Temp 97.7 °F (36.5 °C) (Temporal)   Ht 5' 10.67\" (1.795 m)   Wt 86.6 kg (191 lb)   SpO2 99%   BMI 26.89 kg/m²     Physical Exam  Vitals reviewed.   Constitutional:       General: He is not in acute distress.     Appearance: Normal appearance. He is normal weight. He is not ill-appearing, toxic-appearing or diaphoretic.   HENT:      Head: Normocephalic and atraumatic.      Right Ear: External ear normal.      Left Ear: External ear normal.      Nose: Nose normal.      Mouth/Throat:      Mouth: Mucous membranes are moist.   Eyes:      General: No scleral icterus.     Conjunctiva/sclera: Conjunctivae normal.      Pupils: Pupils are equal, round, and reactive to light. "   Neck:      Vascular: No JVD.      Trachea: No tracheal deviation.   Pulmonary:      Effort: Pulmonary effort is normal. No respiratory distress.   Abdominal:      General: Abdomen is flat. There is no distension.   Musculoskeletal:      Cervical back: Normal range of motion.      Right lower leg: No edema.      Left lower leg: No edema.   Skin:     General: Skin is warm.      Coloration: Skin is not jaundiced.      Findings: No bruising, erythema or rash.   Neurological:      General: No focal deficit present.      Mental Status: He is alert and oriented to person, place, and time. Mental status is at baseline.   Psychiatric:         Mood and Affect: Mood normal.         Behavior: Behavior normal.

## 2025-01-03 NOTE — ASSESSMENT & PLAN NOTE
At goal and relatively unchanged from previous.  Continue Zetia 10 mg daily.  Orders:    ezetimibe (ZETIA) 10 mg tablet; Take 1 tablet (10 mg total) by mouth daily    CBC and differential; Future    Comprehensive metabolic panel; Future    Lipid panel; Future

## 2025-01-03 NOTE — ASSESSMENT & PLAN NOTE
Patient is up-to-date with age-appropriate screenings and immunizations.  Would likely benefit from a Prevnar 20 next year

## 2025-01-27 ENCOUNTER — ESTABLISHED COMPREHENSIVE EXAM (OUTPATIENT)
Dept: URBAN - METROPOLITAN AREA CLINIC 6 | Facility: CLINIC | Age: 80
End: 2025-01-27

## 2025-01-27 DIAGNOSIS — D18.01: ICD-10-CM

## 2025-01-27 DIAGNOSIS — H02.834: ICD-10-CM

## 2025-01-27 DIAGNOSIS — H02.831: ICD-10-CM

## 2025-01-27 DIAGNOSIS — H25.813: ICD-10-CM

## 2025-01-27 DIAGNOSIS — H43.813: ICD-10-CM

## 2025-01-27 DIAGNOSIS — H18.593: ICD-10-CM

## 2025-01-27 PROCEDURE — 92014 COMPRE OPH EXAM EST PT 1/>: CPT

## 2025-01-27 ASSESSMENT — TONOMETRY
OS_IOP_MMHG: 15
OD_IOP_MMHG: 11

## 2025-01-27 ASSESSMENT — VISUAL ACUITY
OS_CC: 20/30-2
OU_CC: 20/30
OD_CC: 20/30
OU_CC: J1+

## 2025-02-02 PROBLEM — Z00.00 MEDICARE ANNUAL WELLNESS VISIT, SUBSEQUENT: Status: RESOLVED | Noted: 2019-06-19 | Resolved: 2025-02-02

## 2025-03-10 ENCOUNTER — TELEPHONE (OUTPATIENT)
Age: 80
End: 2025-03-10

## 2025-03-10 DIAGNOSIS — C61 PROSTATE CANCER (HCC): Primary | ICD-10-CM

## 2025-04-08 ENCOUNTER — OFFICE VISIT (OUTPATIENT)
Dept: OBGYN CLINIC | Facility: CLINIC | Age: 80
End: 2025-04-08
Payer: MEDICARE

## 2025-04-08 ENCOUNTER — HOSPITAL ENCOUNTER (OUTPATIENT)
Dept: RADIOLOGY | Facility: HOSPITAL | Age: 80
Discharge: HOME | End: 2025-04-08
Attending: ORTHOPAEDIC SURGERY
Payer: MEDICARE

## 2025-04-08 VITALS — WEIGHT: 193 LBS | BODY MASS INDEX: 27.02 KG/M2 | HEIGHT: 71 IN

## 2025-04-08 DIAGNOSIS — Z96.652 S/P TKR (TOTAL KNEE REPLACEMENT) USING CEMENT, LEFT: Primary | ICD-10-CM

## 2025-04-08 DIAGNOSIS — Z96.652 S/P TKR (TOTAL KNEE REPLACEMENT) USING CEMENT, LEFT: ICD-10-CM

## 2025-04-08 DIAGNOSIS — M25.561 ACUTE PAIN OF RIGHT KNEE: ICD-10-CM

## 2025-04-08 PROCEDURE — 73562 X-RAY EXAM OF KNEE 3: CPT

## 2025-04-08 PROCEDURE — 99213 OFFICE O/P EST LOW 20 MIN: CPT | Performed by: ORTHOPAEDIC SURGERY

## 2025-04-08 NOTE — ASSESSMENT & PLAN NOTE
New x-rays of the left knee were obtained today and reviewed noting the prosthesis is in good anatomic position with no signs of loosening, fracture or dislocation.  On exam patient has full range of motion with no pain.  Patient is back to all of his activities as tolerated.  Reminder to take antibiotics prior to any dental cleanings or procedures.  Reminder was placed in the patient's AVS.    Orders:    XR knee 3 vw left non injury; Future

## 2025-04-08 NOTE — PATIENT INSTRUCTIONS
STRENGTHENING:   Quadriceps:   Isometric Quad sets                              Reji Henry  1945    [facility]                                                                                               Dr. Arabella Bernard                                                                                                            ANTIBIOTIC USE FOR JOINT REPLACEMENT PATIENTS  You have had surgery to replace one of your joints with a metal prosthesis. Going forward, you should take oral antibiotics before any dental work, including routine cleanings. These procedures are a potential source of injection. If you develop an infection, it could spread to your operative joint and cause complications.  Please show the following guidelines to your medical doctor and dentist, so he/she can prescribe the appropriate medications.  The following information is recommended by the American Heart, Dental, and Orthopedic Associations:  STANDARD GENERAL PROPHYLAXIS  antibiotic prophylaxis recommended lifetime  Recommend refraining from dental procedures until 3 months post operatively  Amoxicillin for Adults:    500mg - Take 4 capsules (2 grams) orally one hour before the procedure  If allergic to Penicillin  Clindamycin for Adults:   300mg - Take 2 capsules (600 mg) orally one hour before the procedure  Azithromycin for Adults:  250mg - Take 2 capsules (500 mg) orally one hour before the procedure  Cephalexin for Adults:     500mg - Take 4 capsules (2 grams) orally one hour before the procedure  Note: Cephalosporins should not be used if you have ever developed an immediate hypersensitivity reaction (i.e., hives, swelling, severe itching, difficulty breathing) to Penicillin  Please feel free to contact our office at [phone] with questions or concerns.

## 2025-04-08 NOTE — PROGRESS NOTES
Assessment:  Assessment & Plan  S/P TKR (total knee replacement) using cement, left  New x-rays of the left knee were obtained today and reviewed noting the prosthesis is in good anatomic position with no signs of loosening, fracture or dislocation.  On exam patient has full range of motion with no pain.  Patient is back to all of his activities as tolerated.  Reminder to take antibiotics prior to any dental cleanings or procedures.  Reminder was placed in the patient's AVS.    Orders:    XR knee 3 vw left non injury; Future    Acute pain of right knee  Exercises were provided for the patient's patella pain.  Encouraged the patient to perform the exercises for the next 3 months to see if he has significant improvement.  If no improvement we will see him in 3 months for reevaluation and new x-rays of the right knee.         To do next visit:  Return in about 1 year (around 4/8/2026) for for left knee.    The above stated was discussed in layman's terms and the patient expressed understanding.  All questions were answered to the patient's satisfaction.       Scribe Attestation      I,:  Taina Haskins am acting as a scribe while in the presence of the attending physician.:       I,:  Arabella Bernard MD personally performed the services described in this documentation    as scribed in my presence.:               Subjective:   Reji Henry is a 79 y.o. male who presents today for a 9 month follow up for his left total knee arthroplasty, 4/22/2024.   He notes around the 2 month palak post op he felt significantly better because he started cutting the grass without pain. He was unable to do that prior to the left total knee. He has some indentation of the medial aspect and numbness on the lateral aspect of the knee. The numbness is getting better and gradually getting better.   He has anterior right knee pain.       Review of systems negative unless otherwise specified in HPI  Review of Systems   Constitutional:   Negative for chills, fever and unexpected weight change.   HENT:  Negative for hearing loss, nosebleeds and sore throat.    Eyes:  Negative for pain, redness and visual disturbance.   Respiratory:  Negative for cough, shortness of breath and wheezing.    Cardiovascular:  Negative for chest pain, palpitations and leg swelling.   Gastrointestinal:  Negative for abdominal pain, nausea and vomiting.   Endocrine: Negative for polydipsia and polyuria.   Genitourinary:  Negative for dysuria and hematuria.   Musculoskeletal:  Negative for arthralgias.   Skin:  Negative for rash and wound.   Neurological:  Negative for dizziness, light-headedness and headaches.   Psychiatric/Behavioral:  Negative for decreased concentration, dysphoric mood and suicidal ideas. The patient is not nervous/anxious.        Past Medical History:   Diagnosis Date    Allergic Spring pollens - years ago    Arthritis 2018    Knee    Basal cell carcinoma of nose     removed 07/2015    BPH with elevated PSA 12/11/2018    Cancer (HCC)     prostate    Hiatal hernia     sliding -- states no GERD symptoms, takes no meds    Hypercholesterolemia     Hyperlipidemia     Mitral valve disorder     Osteoarthritis     Primary osteoarthritis of left knee 12/11/2018    Seasonal allergies     resolved 01/21/2015       Past Surgical History:   Procedure Laterality Date    COLONOSCOPY      DENTAL SURGERY      1970's    EGD      KNEE SURGERY      OTHER SURGICAL HISTORY      cerumen removal 1st 05/26/2011;   2nd:  08/02/2012    TX ARTHRP KNE CONDYLE&PLATU MEDIAL&LAT COMPARTMENTS Left 4/22/2024    Procedure: ARTHROPLASTY KNEE TOTAL and all associated procedures;  Surgeon: Arabella Bernard MD;  [facility];  Service: Orthopedics    TX BX PROSTATE STRTCTC SATURATION SAMPLING IMG GID N/A 06/08/2022    Procedure: TRANSPERINEAL MRI FUSION BIOPSY PROSTATE;  Surgeon: Vicente Kee MD;  Location: [facility];  Service: Urology    TX PROSTATE NEEDLE BIOPSY ANY  APPROACH N/A 04/06/2021    Procedure: TRANSPERINEAL MRI FUSION PROSTATE BIOPSY;  Surgeon: Castro Ortega MD;  Location: [facility];  Service: Urology    PROSTATE BIOPSY         Family History   Problem Relation Age of Onset    Alcohol abuse Father         independently diagnosed     Heart disease Father     Cancer Father     Hypertension Mother     COPD Mother     Diabetes type II Mother     Hypertension Maternal Grandmother     Coronary artery disease Maternal Grandmother     Diabetes Maternal Grandmother     Lung cancer Maternal Grandmother     Coronary artery disease Family     Stroke Paternal Grandmother     Lung cancer Maternal Grandfather        Social History     Occupational History    Occupation:    Tobacco Use    Smoking status: Never    Smokeless tobacco: Never   Vaping Use    Vaping status: Never Used   Substance and Sexual Activity    Alcohol use: Yes     Comment: Less than one drink per week    Drug use: No    Sexual activity: Not on file         Current Outpatient Medications:     ascorbic acid (VITAMIN C) 500 MG tablet, Take 1 tablet (500 mg total) by mouth 2 (two) times a day, Disp: 60 tablet, Rfl: 1    aspirin (ECOTRIN LOW STRENGTH) 81 mg EC tablet, Take 1 tablet (81 mg total) by mouth 2 (two) times a day Take 1(one) 81 mg tablet twice daily x 35 days after total joint arthroplasty, Disp: 70 tablet, Rfl: 0    co-enzyme Q-10 100 mg capsule, Take 100 mg by mouth daily, Disp: , Rfl:     ezetimibe (ZETIA) 10 mg tablet, Take 1 tablet (10 mg total) by mouth daily, Disp: 90 tablet, Rfl: 1    ketoconazole (NIZORAL) 2 % shampoo, if needed, Disp: , Rfl:     Multiple Vitamins-Minerals (MULTIVITAMIN ADULT) TABS, Take 1 tablet by mouth daily, Disp: , Rfl:     Omega-3 Fatty Acids (FISH OIL) 1200 MG CAPS, Take 2 capsules by mouth daily , Disp: , Rfl:     Silodosin 4 MG CAPS, Take 1 capsule (4 mg total) by mouth in the morning (Patient not taking: Reported on 1/3/2025), Disp: 30 capsule, Rfl: 3    Vitamin D,  "Cholecalciferol, 25 MCG (1000 UT) TABS, in the morning, Disp: , Rfl:     No Known Allergies       There were no vitals filed for this visit.    Body mass index is 27.17 kg/m².  Wt Readings from Last 3 Encounters:   04/08/25 87.5 kg (193 lb)   01/03/25 86.6 kg (191 lb)   12/10/24 87.7 kg (193 lb 6.4 oz)       Objective:                    Right Knee Exam     Tenderness   The patient is experiencing tenderness in the patella.    Range of Motion   Extension:  0   Flexion:  130     Tests   Varus: negative Valgus: negative    Other   Erythema: absent  Sensation: normal  Pulse: present  Swelling: none  Effusion: no effusion present    Comments:  Calf is soft and nontender  Valgus alignment      Left Knee Exam     Tenderness   The patient is experiencing no tenderness.     Range of Motion   Extension:  0   Flexion:  130     Tests   Varus: negative Valgus: negative    Other   Erythema: absent  Sensation: normal  Pulse: present  Swelling: none  Effusion: no effusion present    Comments:  Calf soft and nontender        Distally neurovascularly intact     Diagnostics, reviewed and taken today if performed as documented:    The attending physician has personally reviewed the pertinent films in PACS and interpretation is as follows:  X-rays left knee 3 views: Prosthesis is in good anatomic position with no signs of loosening, fracture or dislocation.    Procedures, if performed today:    Procedures    None performed      Portions of the record may have been created with voice recognition software.  Occasional wrong word or \"sound a like\" substitutions may have occurred due to the inherent limitations of voice recognition software.  Read the chart carefully and recognize, using context, where substitutions have occurred.  "

## 2025-04-08 NOTE — ASSESSMENT & PLAN NOTE
Exercises were provided for the patient's patella pain.  Encouraged the patient to perform the exercises for the next 3 months to see if he has significant improvement.  If no improvement we will see him in 3 months for reevaluation and new x-rays of the right knee.

## 2025-04-16 ENCOUNTER — CONSULT (OUTPATIENT)
Dept: INTERNAL MEDICINE CLINIC | Age: 80
End: 2025-04-16

## 2025-04-16 VITALS
HEIGHT: 71 IN | OXYGEN SATURATION: 98 % | SYSTOLIC BLOOD PRESSURE: 126 MMHG | BODY MASS INDEX: 27.16 KG/M2 | DIASTOLIC BLOOD PRESSURE: 80 MMHG | HEART RATE: 81 BPM | TEMPERATURE: 97.5 F | WEIGHT: 194 LBS

## 2025-04-16 DIAGNOSIS — Z01.818 PREOP EXAMINATION: Primary | ICD-10-CM

## 2025-04-16 RX ORDER — AMOXICILLIN 500 MG/1
500 CAPSULE ORAL
COMMUNITY

## 2025-04-16 NOTE — PROGRESS NOTES
Name: Reji Henry      : 1945      MRN: [id number]  Encounter Provider: Dannielle Street MD  Encounter Date: 2025   Encounter department: [facility]  :  Assessment & Plan  Preop examination  No significant PMHx.  Presenting for preop risk assessment prior to bilateral cataract surgery.  Had TKR within the last 1-2 years that was uneventful, no issues with general anesthesia.  No personal history of cardiac disease or angina. Father reportedly may have had CAD with stent placement but unsure.  Repeat 12 lead obtained, compared to ECG from year prior and essentially unchanged, no concerning findings.    RCRI is zero, indicating very low risk of MACE within 30 days with general anesthesia.  Patient is medically optimized and may proceed with his surgery from primary care standpoint. No further cardiac testing indicated.    Risk Factor Score (Yes=1, No=0)   Hx of TIA/CVA 0   Hx of prior ischemic heart disease (AMI, unstable angina, Q waves on EKG, CABG) 0   Hx of congestive heart failure 0   Serum Creatinine >2 mg/dl 0   Insulin dependent diabetes mellitus 0   Total Score 0     Risk of MACE (30-day)     Points Risk % (95% CI), Emiliana, 2017 Risk % (95% CI) Burke,    0 3.9 (2.8-5.4) 0.4 (0.05-1.5)   1 6 (4.9-7.4) 0.9 (0.3-2.1)   2 10.1 (8.1-12.6) 6.6 (3.9-10.3)   3 or more 15 (11.1-20) >11 (5.8-18.4)       Advice    In patients with 3 or more RCRI risk factors (e.g., diabetes mellitus, HF, coronary artery disease, renal insufficiency, cerebrovascular accident), it may be reasonable to begin beta blockers before surgery. (Level of Evidence: B)    Beta-blocker therapy should not be started on the day of surgery (Level of Evidence: B)    In patients with a compelling long-term indication for betablocker therapy but no other RCRI risk factors, initiating beta blockers in the perioperative setting as an approach to reduce perioperative risk is of uncertain benefit  (Level of  "Evidence: B)      Orders:    POCT ECG           History of Present Illness   79 year old male with no sig PMHx presenting for preop visit. Was seen in clinic by PCP Dr. Craig within the last few months for Medicare AWV, no major issues. He had TKR previously with general anesthesia without issues. No history of cardiac disease. Has not had chest pain or symptoms resembling angina within the last year. Offers no medical complaints at this visit.      Review of Systems   All other systems reviewed and are negative.      Objective   /80 (BP Location: Left arm, Patient Position: Sitting, Cuff Size: Large)   Pulse 81   Temp 97.5 °F (36.4 °C) (Temporal)   Ht 5' 10.67\" (1.795 m)   Wt 88 kg (194 lb)   SpO2 98%   BMI 27.31 kg/m²      Physical Exam  Vitals and nursing note reviewed.   Constitutional:       General: He is not in acute distress.     Appearance: He is well-developed.   HENT:      Head: Normocephalic and atraumatic.   Eyes:      Conjunctiva/sclera: Conjunctivae normal.   Cardiovascular:      Rate and Rhythm: Normal rate and regular rhythm.      Heart sounds: No murmur heard.  Pulmonary:      Effort: Pulmonary effort is normal. No respiratory distress.      Breath sounds: Normal breath sounds.   Abdominal:      Palpations: Abdomen is soft.      Tenderness: There is no abdominal tenderness.   Musculoskeletal:         General: No swelling.      Cervical back: Neck supple.   Skin:     General: Skin is warm and dry.      Capillary Refill: Capillary refill takes less than 2 seconds.   Neurological:      Mental Status: He is alert.   Psychiatric:         Mood and Affect: Mood normal.       Administrative Statements   I have spent a total time of 20 minutes in caring for this patient on the day of the visit/encounter including Counseling / Coordination of care, Documenting in the medical record, Reviewing/placing orders in the medical record (including tests, medications, and/or procedures), Obtaining " or reviewing history  , and Communicating with other healthcare professionals .

## 2025-04-22 ENCOUNTER — SURGERY/PROCEDURE (OUTPATIENT)
Dept: URBAN - METROPOLITAN AREA SURGICAL CENTER 6 | Facility: SURGICAL CENTER | Age: 80
End: 2025-04-22

## 2025-04-22 DIAGNOSIS — H25.811: ICD-10-CM

## 2025-04-22 PROCEDURE — 66984 XCAPSL CTRC RMVL W/O ECP: CPT

## 2025-04-22 PROCEDURE — MISCFEMTO FEMTO

## 2025-04-23 ENCOUNTER — 1 DAY POST-OP (OUTPATIENT)
Dept: URBAN - METROPOLITAN AREA CLINIC 6 | Facility: CLINIC | Age: 80
End: 2025-04-23

## 2025-04-23 DIAGNOSIS — H25.812: ICD-10-CM

## 2025-04-23 DIAGNOSIS — Z96.1: ICD-10-CM

## 2025-04-23 PROCEDURE — 99024 POSTOP FOLLOW-UP VISIT: CPT

## 2025-04-23 PROCEDURE — 92136 OPHTHALMIC BIOMETRY: CPT | Mod: 26,LT

## 2025-04-23 ASSESSMENT — TONOMETRY
OD_IOP_MMHG: 18
OS_IOP_MMHG: 11

## 2025-04-23 ASSESSMENT — KERATOMETRY
OD_K1POWER_DIOPTERS: 42.00
OD_AXISANGLE_DEGREES: 57
OS_K1POWER_DIOPTERS: 40.75
OS_AXISANGLE_DEGREES: 81
OD_AXISANGLE2_DEGREES: 147
OD_K2POWER_DIOPTERS: 42.50
OS_AXISANGLE2_DEGREES: 171
OS_K2POWER_DIOPTERS: 42.50

## 2025-04-23 ASSESSMENT — VISUAL ACUITY
OD_SC: 20/50
OD_PH: 20/30

## 2025-04-30 ENCOUNTER — 1 WEEK POST-OP (OUTPATIENT)
Dept: URBAN - METROPOLITAN AREA CLINIC 6 | Facility: CLINIC | Age: 80
End: 2025-04-30

## 2025-04-30 DIAGNOSIS — H25.812: ICD-10-CM

## 2025-04-30 DIAGNOSIS — Z96.1: ICD-10-CM

## 2025-04-30 PROCEDURE — 99024 POSTOP FOLLOW-UP VISIT: CPT

## 2025-04-30 ASSESSMENT — KERATOMETRY
OS_AXISANGLE_DEGREES: 81
OD_K2POWER_DIOPTERS: 42.50
OS_K2POWER_DIOPTERS: 42.50
OS_K1POWER_DIOPTERS: 40.75
OS_AXISANGLE2_DEGREES: 171
OD_K1POWER_DIOPTERS: 42.00
OD_AXISANGLE_DEGREES: 57
OD_AXISANGLE2_DEGREES: 147

## 2025-04-30 ASSESSMENT — TONOMETRY
OS_IOP_MMHG: 12
OD_IOP_MMHG: 10

## 2025-04-30 ASSESSMENT — VISUAL ACUITY
OS_CC: 20/40
OD_SC: 20/30-2

## 2025-05-07 ENCOUNTER — 1 DAY POST-OP (OUTPATIENT)
Dept: URBAN - METROPOLITAN AREA CLINIC 6 | Facility: CLINIC | Age: 80
End: 2025-05-07

## 2025-05-07 DIAGNOSIS — Z96.1: ICD-10-CM

## 2025-05-07 PROCEDURE — 99024 POSTOP FOLLOW-UP VISIT: CPT

## 2025-05-07 ASSESSMENT — KERATOMETRY
OD_K2POWER_DIOPTERS: 42.50
OD_AXISANGLE2_DEGREES: 147
OS_AXISANGLE_DEGREES: 81
OS_K2POWER_DIOPTERS: 42.50
OD_K1POWER_DIOPTERS: 42.00
OD_AXISANGLE_DEGREES: 57
OS_K1POWER_DIOPTERS: 40.75
OS_AXISANGLE2_DEGREES: 171

## 2025-05-07 ASSESSMENT — TONOMETRY
OD_IOP_MMHG: 11
OS_IOP_MMHG: 21

## 2025-05-07 ASSESSMENT — VISUAL ACUITY
OS_PH: 20/40
OD_SC: 20/40-2
OS_SC: 20/80+2

## 2025-05-14 ENCOUNTER — 1 WEEK POST-OP (OUTPATIENT)
Dept: URBAN - METROPOLITAN AREA CLINIC 6 | Facility: CLINIC | Age: 80
End: 2025-05-14

## 2025-05-14 DIAGNOSIS — H18.593: ICD-10-CM

## 2025-05-14 DIAGNOSIS — Z96.1: ICD-10-CM

## 2025-05-14 PROCEDURE — 99024 POSTOP FOLLOW-UP VISIT: CPT

## 2025-05-14 ASSESSMENT — KERATOMETRY
OS_AXISANGLE_DEGREES: 81
OS_AXISANGLE2_DEGREES: 171
OD_K1POWER_DIOPTERS: 42.00
OS_K2POWER_DIOPTERS: 42.50
OD_K2POWER_DIOPTERS: 42.50
OS_K1POWER_DIOPTERS: 40.75
OD_AXISANGLE2_DEGREES: 147
OD_AXISANGLE_DEGREES: 57

## 2025-05-14 ASSESSMENT — TONOMETRY
OD_IOP_MMHG: 10
OS_IOP_MMHG: 10

## 2025-05-14 ASSESSMENT — VISUAL ACUITY
OS_SC: 20/80
OD_SC: 20/50+1

## 2025-05-21 ENCOUNTER — OFFICE VISIT (OUTPATIENT)
Age: 80
End: 2025-05-21

## 2025-05-21 VITALS
SYSTOLIC BLOOD PRESSURE: 136 MMHG | DIASTOLIC BLOOD PRESSURE: 72 MMHG | OXYGEN SATURATION: 98 % | WEIGHT: 196 LBS | BODY MASS INDEX: 28.06 KG/M2 | HEIGHT: 70 IN | TEMPERATURE: 98.5 F | HEART RATE: 86 BPM

## 2025-05-21 DIAGNOSIS — J20.9 ACUTE BRONCHITIS, UNSPECIFIED ORGANISM: ICD-10-CM

## 2025-05-21 DIAGNOSIS — J06.9 VIRAL URI WITH COUGH: Primary | ICD-10-CM

## 2025-05-21 RX ORDER — SODIUM CHLORIDE 50 MG/ML
1 SOLUTION OPHTHALMIC 3 TIMES DAILY
COMMUNITY
Start: 2025-04-30

## 2025-05-21 RX ORDER — HYDROXYZINE HYDROCHLORIDE 10 MG/5ML
4 SYRUP ORAL EVERY 6 HOURS PRN
Qty: 30 TABLET | Refills: 0 | Status: SHIPPED | OUTPATIENT
Start: 2025-05-21

## 2025-05-21 NOTE — ASSESSMENT & PLAN NOTE
Recommended use of over-the-counter decongestant medication such as chlorpheniramine 4 mg every 6-8 hours as needed, over-the-counter cough medication Trisha-[place] cold and flu, Delsym, Mucinex DM.  Should symptoms worsen or if there is onset of any fever patient should contact the office.    Orders:    chlorpheniramine (CHLOR-TRIMETON) 4 MG tablet; Take 1 tablet (4 mg total) by mouth every 6 (six) hours as needed for rhinitis

## 2025-05-21 NOTE — PROGRESS NOTES
Name: Reji Henry      : 1945      MRN: [id number]  Encounter Provider: Jose Craig MD  Encounter Date: 2025   [facility]  :  Assessment & Plan  Viral URI with cough  Recommended use of over-the-counter decongestant medication such as chlorpheniramine 4 mg every 6-8 hours as needed, over-the-counter cough medication Trisha[place] cold and flu, Delsym, Mucinex DM.  Should symptoms worsen or if there is onset of any fever patient should contact the office.    Orders:    chlorpheniramine (CHLOR-TRIMETON) 4 MG tablet; Take 1 tablet (4 mg total) by mouth every 6 (six) hours as needed for rhinitis    Acute bronchitis, unspecified organism  Recommended use of over-the-counter decongestant medication such as chlorpheniramine 4 mg every 6-8 hours as needed, over-the-counter cough medication [place] cold and flu, Delsym, Mucinex DM.  Should symptoms worsen or if there is onset of any fever patient should contact the office.                History of Present Illness   Patient presents to the office with cold symptoms over the last 3 days but yesterday noticed some increasing congestion deep in his chest with a deeper cough.  He has had no fever or chills denies any wheezing.  He took a COVID test yesterday which was negative.  He did have some relief at bedtime by taking some [place]isha[place] cold and flu.  Denies any shaking chills or night sweats.  He continues to do well following his knee replacement surgery.  He has no pain and is ambulating without difficulty.  Recently underwent bilateral cataract surgeries.  Her surgery was fairly uneventful second surgery was complicated by some visual disturbances postoperatively secondary to corneal swelling which is now finally improving.      Review of Systems   Constitutional: Negative.    HENT:  Positive for congestion.    Eyes:  Positive for visual disturbance.   Respiratory:  Positive for  "cough. Negative for shortness of breath, wheezing and stridor.    Cardiovascular: Negative.    Gastrointestinal: Negative.    Endocrine: Negative.    Genitourinary: Negative.    Musculoskeletal: Negative.    Skin: Negative.    Allergic/Immunologic: Negative.    Neurological: Negative.    Hematological: Negative.    Psychiatric/Behavioral: Negative.         Objective   /72 (BP Location: Left arm, Patient Position: Sitting, Cuff Size: Standard)   Pulse 86   Temp 98.5 °F (36.9 °C) (Temporal)   Ht 5' 10.47\" (1.79 m)   Wt 88.9 kg (196 lb)   SpO2 98%   BMI 27.75 kg/m²      Physical Exam  Vitals reviewed.   Constitutional:       General: He is not in acute distress.     Appearance: Normal appearance. He is normal weight. He is not ill-appearing, toxic-appearing or diaphoretic.   HENT:      Head: Normocephalic and atraumatic.      Right Ear: External ear normal.      Left Ear: External ear normal.      Nose: Nose normal. No congestion.      Mouth/Throat:      Mouth: Mucous membranes are moist.      Pharynx: Oropharynx is clear.     Eyes:      General: No scleral icterus.     Conjunctiva/sclera: Conjunctivae normal.      Pupils: Pupils are equal, round, and reactive to light.       Cardiovascular:      Rate and Rhythm: Normal rate and regular rhythm.      Heart sounds: Normal heart sounds. No murmur heard.  Pulmonary:      Effort: Pulmonary effort is normal. No respiratory distress.      Breath sounds: Normal breath sounds. No wheezing, rhonchi or rales.   Chest:      Chest wall: No tenderness.   Abdominal:      General: Abdomen is flat. There is no distension.      Palpations: Abdomen is soft.     Musculoskeletal:         General: No swelling or signs of injury. Normal range of motion.      Cervical back: Neck supple.      Left lower leg: No edema.   Lymphadenopathy:      Cervical: No cervical adenopathy.     Skin:     General: Skin is warm and dry.      Capillary Refill: Capillary refill takes less than 2 " seconds.      Coloration: Skin is not jaundiced.      Findings: No bruising or erythema.     Neurological:      General: No focal deficit present.      Mental Status: He is alert and oriented to person, place, and time. Mental status is at baseline.      Cranial Nerves: No cranial nerve deficit.      Sensory: No sensory deficit.     Psychiatric:         Mood and Affect: Mood normal.         Behavior: Behavior normal.         Thought Content: Thought content normal.         Judgment: Judgment normal.

## 2025-05-21 NOTE — ASSESSMENT & PLAN NOTE
Recommended use of over-the-counter decongestant medication such as chlorpheniramine 4 mg every 6-8 hours as needed, over-the-counter cough medication Trisha-[place] cold and flu, Delsym, Mucinex DM.  Should symptoms worsen or if there is onset of any fever patient should contact the office.

## 2025-05-23 ENCOUNTER — TELEPHONE (OUTPATIENT)
Age: 80
End: 2025-05-23

## 2025-05-23 DIAGNOSIS — J20.9 ACUTE BRONCHITIS, UNSPECIFIED ORGANISM: Primary | ICD-10-CM

## 2025-05-23 RX ORDER — AZITHROMYCIN 250 MG/1
TABLET, FILM COATED ORAL
Qty: 6 TABLET | Refills: 0 | Status: SHIPPED | OUTPATIENT
Start: 2025-05-23 | End: 2025-05-28

## 2025-05-23 NOTE — PROGRESS NOTES
Patient continues with cough and his nails running low-grade fevers.  We will provide a prescription for azithromycin x 5 days

## 2025-05-23 NOTE — TELEPHONE ENCOUNTER
Pt called in stated he was seen wednesday w/ Dr. BAUTISTA pt had a fever last  last night and wanted to know if Dr. Bowden could sent another medication to the pharmacy

## 2025-06-19 ENCOUNTER — APPOINTMENT (OUTPATIENT)
Dept: LAB | Facility: CLINIC | Age: 80
End: 2025-06-19
Payer: MEDICARE

## 2025-06-19 DIAGNOSIS — C61 PROSTATE CANCER (HCC): ICD-10-CM

## 2025-06-19 LAB — PSA SERPL-MCNC: 6.84 NG/ML (ref 0–4)

## 2025-06-19 PROCEDURE — 36415 COLL VENOUS BLD VENIPUNCTURE: CPT

## 2025-06-19 PROCEDURE — 84153 ASSAY OF PSA TOTAL: CPT

## 2025-06-20 PROBLEM — J06.9 VIRAL URI WITH COUGH: Status: RESOLVED | Noted: 2025-05-21 | Resolved: 2025-06-20

## 2025-06-24 ENCOUNTER — OFFICE VISIT (OUTPATIENT)
Dept: UROLOGY | Facility: AMBULATORY SURGERY CENTER | Age: 80
End: 2025-06-24
Payer: MEDICARE

## 2025-06-24 VITALS
OXYGEN SATURATION: 98 % | WEIGHT: 190 LBS | HEART RATE: 89 BPM | SYSTOLIC BLOOD PRESSURE: 140 MMHG | DIASTOLIC BLOOD PRESSURE: 80 MMHG | HEIGHT: 70 IN | BODY MASS INDEX: 27.2 KG/M2

## 2025-06-24 DIAGNOSIS — C61 PROSTATE CANCER (HCC): Primary | ICD-10-CM

## 2025-06-24 PROCEDURE — 99213 OFFICE O/P EST LOW 20 MIN: CPT | Performed by: UROLOGY

## 2025-06-24 NOTE — ASSESSMENT & PLAN NOTE
Impression:  This is a 79-year-old male with a history of low-volume intermediate favorable risk [place] 3+4 prostate cancer electing active surveillance.  Most recent surveillance biopsy 2022 negative for prostate cancer.  PSA this month 6.5 stable    Plan:  Return in 6 months for PSA check.  Will consider MRI next year and possible de-escalation of surveillance schedule given most recent negative biopsy.  Orders:  •  PSA Total, Diagnostic; Future

## 2025-06-24 NOTE — PROGRESS NOTES
Name: Reji Henry      : 1945      MRN: [id number]  Encounter Provider: Pablo Recinos MD  Encounter Date: 2025   Encounter department: [facility] UROLOGY [place]  :  Assessment & Plan  Prostate cancer (HCC)    Impression:  This is a 79-year-old male with a history of low-volume intermediate favorable risk [place] 3+4 prostate cancer electing active surveillance.  Most recent surveillance biopsy  negative for prostate cancer.  PSA this month 6.5 stable    Plan:  Return in 6 months for PSA check.  Will consider MRI next year and possible de-escalation of surveillance schedule given most recent negative biopsy.  Orders:  •  PSA Total, Diagnostic; Future        History of Present Illness   Reji Henry is a 79 y.o. male who presents for follow-up of his [place] 3+3/3+4 prostate cancer on active surveillance.  Surveillance since  when he was first diagnosed with 3 cores of [place] 3+3 prostate cancer.  His most recent prostate biopsy was performed in  which was negative for any evidence of prostate cancer.  PSA today stable.  He has no new urinary complaints.  A Prolaris was performed in  which revealed a 2% risk of 10-year disease specific mortality and 1% chance of metastatic disease.    He presents today for PSA review which is stable.  He has no new urinary complaints.  Options discussed.  He is agreeable to follow-up 6 months with PSA check.  AUA SYMPTOM SCORE      Flowsheet Row Most Recent Value   AUA SYMPTOM SCORE    How often have you had a sensation of not emptying your bladder completely after you finished urinating? 1 (P)     How often have you had to urinate again less than two hours after you finished urinating? 3 (P)     How often have you found you stopped and started again several times when you urinate? 1 (P)     How often have you found it difficult to postpone urination? 1 (P)     How often have you had a weak urinary stream? 5 (P)     How often  "have you had to push or strain to begin urination? 0 (P)     How many times did you most typically get up to urinate from the time you went to bed at night until the time you got up in the morning? 5 (P)     Quality of Life: If you were to spend the rest of your life with your urinary condition just the way it is now, how would you feel about that? 3 (P)     AUA SYMPTOM SCORE 16 (P)            Review of Systems       Objective   /80 (BP Location: Left arm, Patient Position: Sitting, Cuff Size: Adult)   Pulse 89   Ht 5' 10\" (1.778 m)   Wt 86.2 kg (190 lb)   SpO2 98%   BMI 27.26 kg/m²     Physical Exam  Constitutional:       General: He is not in acute distress.     Appearance: Normal appearance. He is not ill-appearing.   HENT:      Head: Normocephalic and atraumatic.   Pulmonary:      Effort: Pulmonary effort is normal. No respiratory distress.     Skin:     General: Skin is warm.     Neurological:      Mental Status: He is alert and oriented to person, place, and time.     Psychiatric:         Mood and Affect: Mood normal.           Results   Lab Results   Component Value Date    PSA 6.842 (H) 06/19/2025    PSA 6.514 (H) 12/02/2024    PSA 7.67 (H) 06/05/2024     Lab Results   Component Value Date    CALCIUM 9.1 12/30/2024    K 4.1 12/30/2024    CO2 28 12/30/2024     12/30/2024    BUN 16 12/30/2024    CREATININE 0.98 12/30/2024     Lab Results   Component Value Date    WBC 5.56 12/30/2024    HGB 15.9 12/30/2024    HCT 45.7 12/30/2024    MCV 92 12/30/2024     12/30/2024       Office Urine Dip  No results found for this or any previous visit (from the past hour).        "

## 2025-06-25 ENCOUNTER — POST-OP CHECK (OUTPATIENT)
Dept: URBAN - METROPOLITAN AREA CLINIC 6 | Facility: CLINIC | Age: 80
End: 2025-06-25

## 2025-06-25 DIAGNOSIS — Z96.1: ICD-10-CM

## 2025-06-25 PROCEDURE — 99024 POSTOP FOLLOW-UP VISIT: CPT

## 2025-06-25 ASSESSMENT — KERATOMETRY
OD_AXISANGLE2_DEGREES: 147
OD_AXISANGLE_DEGREES: 57
OS_K2POWER_DIOPTERS: 42.50
OS_AXISANGLE2_DEGREES: 171
OS_AXISANGLE_DEGREES: 81
OD_K1POWER_DIOPTERS: 42.00
OD_K2POWER_DIOPTERS: 42.50
OS_K1POWER_DIOPTERS: 40.75

## 2025-06-25 ASSESSMENT — TONOMETRY
OD_IOP_MMHG: 10
OS_IOP_MMHG: 9

## 2025-06-25 ASSESSMENT — VISUAL ACUITY
OD_PH: 20/30
OD_SC: 20/40-1
OS_SC: 20/30

## 2025-06-30 ENCOUNTER — RA CDI HCC (OUTPATIENT)
Dept: OTHER | Facility: HOSPITAL | Age: 80
End: 2025-06-30

## 2025-07-01 ENCOUNTER — APPOINTMENT (OUTPATIENT)
Dept: LAB | Facility: CLINIC | Age: 80
End: 2025-07-01
Attending: INTERNAL MEDICINE
Payer: MEDICARE

## 2025-07-01 DIAGNOSIS — C61 PROSTATE CANCER (HCC): ICD-10-CM

## 2025-07-07 ENCOUNTER — OFFICE VISIT (OUTPATIENT)
Age: 80
End: 2025-07-07
Payer: MEDICARE

## 2025-07-07 VITALS
SYSTOLIC BLOOD PRESSURE: 132 MMHG | TEMPERATURE: 98.3 F | DIASTOLIC BLOOD PRESSURE: 74 MMHG | OXYGEN SATURATION: 97 % | HEART RATE: 75 BPM | WEIGHT: 191.8 LBS | BODY MASS INDEX: 27.46 KG/M2 | HEIGHT: 70 IN

## 2025-07-07 DIAGNOSIS — I10 BENIGN ESSENTIAL HTN: Primary | ICD-10-CM

## 2025-07-07 DIAGNOSIS — E78.00 HYPERCHOLESTEROLEMIA: ICD-10-CM

## 2025-07-07 DIAGNOSIS — C61 PROSTATE CANCER (HCC): ICD-10-CM

## 2025-07-07 PROBLEM — K42.9 UMBILICAL HERNIA: Status: ACTIVE | Noted: 2025-07-07

## 2025-07-07 PROBLEM — J20.9 ACUTE BRONCHITIS: Status: RESOLVED | Noted: 2025-05-21 | Resolved: 2025-07-07

## 2025-07-07 PROCEDURE — G2211 COMPLEX E/M VISIT ADD ON: HCPCS | Performed by: INTERNAL MEDICINE

## 2025-07-07 PROCEDURE — 99213 OFFICE O/P EST LOW 20 MIN: CPT | Performed by: INTERNAL MEDICINE

## 2025-07-07 RX ORDER — EZETIMIBE 10 MG/1
10 TABLET ORAL DAILY
Qty: 100 TABLET | Refills: 1 | Status: SHIPPED | OUTPATIENT
Start: 2025-07-07

## 2025-07-07 NOTE — ASSESSMENT & PLAN NOTE
Lipid levels are stable.  Total cholesterol 196, triglycerides 107, HDL cholesterol 43 and LDL cholesterol 132.  Continues on ezetimibe 10 mg daily

## 2025-07-07 NOTE — PROGRESS NOTES
Name: Reji Henry      : 1945      MRN: [id number]  Encounter Provider: Jose Craig MD  Encounter Date: 2025   [facility]  :  Assessment & Plan  Hypercholesterolemia  Lipid levels are stable.  Total cholesterol 196, triglycerides 107, HDL cholesterol 43 and LDL cholesterol 132.  Continues on ezetimibe 10 mg daily  Benign essential HTN  Blood pressure stable without medication       Prostate cancer (HCC)  Continues with surveillance through urology.  Most recent PSA is 6.842 slightly increased from 6 months ago.              History of Present Illness   Patient presents for follow-up visit.  And is feeling generally well.  No major issues.  Recent follow-up with urology regarding his prostate cancer.  PSA is mildly increased from previous.  MRI has shown no changes concerning for extraprostatic extension.  He reports no problems with any medications.  Other labs are reviewed.  Metabolic panel was unremarkable other than a serum sodium of 134.  CBC was normal lipid profile was near goal levels.  LDL cholesterol mildly elevated at 132.  He was recently seen and evaluated for an upper respiratory infection which has primarily resolved he has a residual cough but nothing concerning.  His left knee arthroplasty is doing quite well he feels good he has some concerns about his right knee with which he experiences some episodic stiffness but no pain.  He was seen by orthopedics and was referred to some physical therapy which has helped.      Review of Systems   Constitutional: Negative.    HENT: Negative.     Eyes: Negative.    Respiratory: Negative.     Cardiovascular: Negative.    Gastrointestinal: Negative.    Endocrine: Negative.    Genitourinary: Negative.    Musculoskeletal: Negative.    Allergic/Immunologic: Negative.    Neurological: Negative.    Hematological: Negative.    Psychiatric/Behavioral: Negative.         Objective   BP  "132/74 (BP Location: Left arm, Patient Position: Sitting, Cuff Size: Standard)   Pulse 75   Temp 98.3 °F (36.8 °C) (Temporal)   Ht 5' 10.47\" (1.79 m)   Wt 87 kg (191 lb 12.8 oz)   SpO2 97%   BMI 27.15 kg/m²      Physical Exam  Vitals reviewed.   Constitutional:       General: He is not in acute distress.     Appearance: Normal appearance. He is not ill-appearing, toxic-appearing or diaphoretic.   HENT:      Head: Normocephalic and atraumatic.      Right Ear: External ear normal.      Left Ear: External ear normal.      Nose: Nose normal.      Mouth/Throat:      Mouth: Mucous membranes are moist.      Pharynx: Oropharynx is clear.     Eyes:      General: No scleral icterus.     Conjunctiva/sclera: Conjunctivae normal.      Pupils: Pupils are equal, round, and reactive to light.       Cardiovascular:      Rate and Rhythm: Normal rate and regular rhythm.      Heart sounds: Normal heart sounds. No murmur heard.  Pulmonary:      Effort: Pulmonary effort is normal. No respiratory distress.      Breath sounds: Normal breath sounds.   Abdominal:      General: Abdomen is flat. There is no distension.     Musculoskeletal:         General: No swelling.      Cervical back: Neck supple.      Right lower leg: No edema.      Left lower leg: No edema.     Skin:     General: Skin is warm.      Coloration: Skin is not jaundiced.      Findings: No bruising, erythema or rash.     Neurological:      General: No focal deficit present.      Mental Status: He is alert and oriented to person, place, and time. Mental status is at baseline.     Psychiatric:         Mood and Affect: Mood normal.         Behavior: Behavior normal.         Thought Content: Thought content normal.         Judgment: Judgment normal.         "

## 2025-07-07 NOTE — ASSESSMENT & PLAN NOTE
Continues with surveillance through urology.  Most recent PSA is 6.842 slightly increased from 6 months ago.

## 2025-07-15 ENCOUNTER — HOSPITAL ENCOUNTER (OUTPATIENT)
Dept: RADIOLOGY | Facility: HOSPITAL | Age: 80
Discharge: HOME | End: 2025-07-15
Attending: ORTHOPAEDIC SURGERY
Payer: MEDICARE

## 2025-07-15 VITALS — HEIGHT: 70 IN | BODY MASS INDEX: 27.35 KG/M2 | WEIGHT: 191 LBS

## 2025-07-15 DIAGNOSIS — M25.561 ACUTE PAIN OF RIGHT KNEE: ICD-10-CM

## 2025-07-15 DIAGNOSIS — M17.11 ARTHRITIS OF RIGHT KNEE: Primary | ICD-10-CM

## 2025-07-15 PROCEDURE — 73562 X-RAY EXAM OF KNEE 3: CPT

## 2025-07-15 PROCEDURE — 99213 OFFICE O/P EST LOW 20 MIN: CPT | Performed by: ORTHOPAEDIC SURGERY

## (undated) DEVICE — SYRINGE 30ML LL

## (undated) DEVICE — DUAL CUT SAGITTAL BLADE

## (undated) DEVICE — 3 BONE CEMENT MIXER: Brand: MIXEVAC

## (undated) DEVICE — PREP PAD BNS: Brand: CONVERTORS

## (undated) DEVICE — CHLORAPREP HI-LITE 26ML ORANGE

## (undated) DEVICE — GAUZE SPONGES,16 PLY: Brand: CURITY

## (undated) DEVICE — SPONGE SCRUB 4 PCT CHLORHEXIDINE

## (undated) DEVICE — ACE WRAP 6 IN STERILE

## (undated) DEVICE — PADDING CAST 4 IN  COTTON STRL

## (undated) DEVICE — PAD CAST 6 IN COTTON NON STERILE

## (undated) DEVICE — HANDPIECE SET WITH RETRACTABLE COAXIAL FAN SPRAY TIP AND SUCTION TUBE: Brand: INTERPULSE

## (undated) DEVICE — CAPIT KNEE ATTUNE RP W/DOM

## (undated) DEVICE — COOL TEMP PAD

## (undated) DEVICE — SKN PRP WNG SPNGE PVP SCRB STR: Brand: MEDLINE INDUSTRIES, INC.

## (undated) DEVICE — GLOVE SRG BIOGEL ORTHOPEDIC 7.5

## (undated) DEVICE — SUT VICRYL 1 CT-1 27 IN J261H

## (undated) DEVICE — SPECIMEN CONTAINER STERILE PEEL PACK

## (undated) DEVICE — MAX-CORE® DISPOSABLE CORE BIOPSY INSTRUMENT, 18G X 25CM: Brand: MAX-CORE

## (undated) DEVICE — RECIPROCATING BLADE, DOUBLE SIDED, OFFSET  (70.0 X 0.64 X 12.6MM)

## (undated) DEVICE — 3M™ TRANSPORE™ WHITE SURGICAL TAPE 1534-1, 1 INCH X 10 YARD (2,5CM X 9,1M), 12 ROLLS/CARTON 10 CARTONS/CASE: Brand: 3M™ TRANSPORE™

## (undated) DEVICE — 3M™ IOBAN™ 2 ANTIMICROBIAL INCISE DRAPE 6640EZ: Brand: IOBAN™ 2

## (undated) DEVICE — TELFA NON-ADHERENT ABSORBENT DRESSING: Brand: TELFA

## (undated) DEVICE — PENCIL ELECTROSURG E-Z CLEAN -0035H

## (undated) DEVICE — UTILITY MARKER,BLACK WITH LABELS: Brand: DEVON

## (undated) DEVICE — NEEDLE 25G X 1 1/2

## (undated) DEVICE — SILVER-COATED ANTIMICROBIAL BARRIER DRESSING: Brand: ACTICOAT   4" X 8"

## (undated) DEVICE — GLOVE SRG BIOGEL 8

## (undated) DEVICE — TRAY FOLEY 16FR URIMETER SURESTEP

## (undated) DEVICE — ABDOMINAL PAD: Brand: DERMACEA

## (undated) DEVICE — SYSTEM TRANSPERINEAL ACCESS PRECISIONPOINT

## (undated) DEVICE — ASTOUND STANDARD SURGICAL GOWN, XL: Brand: CONVERTORS

## (undated) DEVICE — HOOD WITH PEEL AWAY FACE SHIELD: Brand: T7PLUS

## (undated) DEVICE — IMPERVIOUS STOCKINETTE: Brand: DEROYAL

## (undated) DEVICE — BETHLEHEM UNIV TOTAL KNEE, KIT: Brand: CARDINAL HEALTH

## (undated) DEVICE — NEEDLE 18 G X 1 1/2 SAFETY

## (undated) DEVICE — HEAVY DUTY TABLE COVER: Brand: CONVERTORS

## (undated) DEVICE — JP 3-SPRING RES W/10FR PVC DRAIN/TR: Brand: CARDINAL HEALTH

## (undated) DEVICE — SUT VICRYL 2-0 CT-1 27 IN J259H

## (undated) DEVICE — HOOD: Brand: T7PLUS

## (undated) DEVICE — SYRINGE 10ML LL

## (undated) DEVICE — GLOVE INDICATOR PI UNDERGLOVE SZ 8.5 BLUE

## (undated) DEVICE — ACE WRAP 6 IN UNSTERILE

## (undated) DEVICE — COBAN 4 IN STERILE

## (undated) RX ORDER — SODIUM CHLORIDE 50 MG/ML: 1 SOLUTION OPHTHALMIC